# Patient Record
Sex: MALE | Race: WHITE | Employment: UNEMPLOYED | URBAN - METROPOLITAN AREA
[De-identification: names, ages, dates, MRNs, and addresses within clinical notes are randomized per-mention and may not be internally consistent; named-entity substitution may affect disease eponyms.]

---

## 2022-10-27 ENCOUNTER — HOSPITAL ENCOUNTER (OUTPATIENT)
Dept: MRI IMAGING | Age: 62
Discharge: HOME | End: 2022-10-29
Payer: OTHER GOVERNMENT

## 2022-10-27 DIAGNOSIS — R93.2 ABNORMAL FINDINGS ON DIAGNOSTIC IMAGING OF LIVER: ICD-10-CM

## 2022-10-27 PROCEDURE — 6360000004 HC RX CONTRAST MEDICATION: Performed by: RADIOLOGY

## 2022-10-27 PROCEDURE — 74183 MRI ABD W/O CNTR FLWD CNTR: CPT

## 2022-10-27 PROCEDURE — A9581 GADOXETATE DISODIUM INJ: HCPCS | Performed by: RADIOLOGY

## 2022-10-27 RX ADMIN — GADOXETATE DISODIUM 10 ML: 181.43 INJECTION, SOLUTION INTRAVENOUS at 10:50

## 2022-11-22 ENCOUNTER — OFFICE VISIT (OUTPATIENT)
Dept: ENDOCRINOLOGY | Age: 62
End: 2022-11-22
Payer: OTHER GOVERNMENT

## 2022-11-22 VITALS
HEIGHT: 75 IN | RESPIRATION RATE: 18 BRPM | OXYGEN SATURATION: 99 % | HEART RATE: 68 BPM | BODY MASS INDEX: 31.08 KG/M2 | WEIGHT: 250 LBS | DIASTOLIC BLOOD PRESSURE: 71 MMHG | SYSTOLIC BLOOD PRESSURE: 122 MMHG

## 2022-11-22 DIAGNOSIS — N52.9 ERECTILE DYSFUNCTION, UNSPECIFIED ERECTILE DYSFUNCTION TYPE: ICD-10-CM

## 2022-11-22 DIAGNOSIS — E29.1 HYPOGONADISM IN MALE: Primary | ICD-10-CM

## 2022-11-22 DIAGNOSIS — E29.1 HYPOGONADISM IN MALE: ICD-10-CM

## 2022-11-22 DIAGNOSIS — E55.9 VITAMIN D DEFICIENCY: ICD-10-CM

## 2022-11-22 LAB
ANION GAP SERPL CALCULATED.3IONS-SCNC: 7 MMOL/L (ref 7–16)
BUN BLDV-MCNC: 11 MG/DL (ref 6–23)
CALCIUM SERPL-MCNC: 9.3 MG/DL (ref 8.6–10.2)
CHLORIDE BLD-SCNC: 101 MMOL/L (ref 98–107)
CO2: 31 MMOL/L (ref 22–29)
CREAT SERPL-MCNC: 1 MG/DL (ref 0.7–1.2)
GFR SERPL CREATININE-BSD FRML MDRD: >60 ML/MIN/1.73
GLUCOSE BLD-MCNC: 84 MG/DL (ref 74–99)
HCT VFR BLD CALC: 48.3 % (ref 37–54)
POTASSIUM SERPL-SCNC: 4.5 MMOL/L (ref 3.5–5)
PROSTATE SPECIFIC ANTIGEN: 5.96 NG/ML (ref 0–4)
SODIUM BLD-SCNC: 139 MMOL/L (ref 132–146)
VITAMIN D 25-HYDROXY: 68 NG/ML (ref 30–100)

## 2022-11-22 PROCEDURE — 99204 OFFICE O/P NEW MOD 45 MIN: CPT | Performed by: INTERNAL MEDICINE

## 2022-11-22 RX ORDER — SILDENAFIL 100 MG/1
TABLET, FILM COATED ORAL
COMMUNITY
Start: 2022-06-10

## 2022-11-22 RX ORDER — ASCORBIC ACID 500 MG
TABLET ORAL
COMMUNITY
Start: 2021-01-27

## 2022-11-22 RX ORDER — TESTOSTERONE 16.2 MG/G
GEL TRANSDERMAL
Qty: 75 G | Refills: 3
Start: 2022-11-22 | End: 2023-04-17

## 2022-11-22 RX ORDER — POTASSIUM CHLORIDE 750 MG/1
TABLET, FILM COATED, EXTENDED RELEASE ORAL
COMMUNITY
Start: 2021-11-17

## 2022-11-22 RX ORDER — FUROSEMIDE 40 MG/1
TABLET ORAL
COMMUNITY
Start: 2022-08-09

## 2022-11-22 RX ORDER — METOPROLOL TARTRATE 100 MG/1
TABLET ORAL
COMMUNITY
Start: 2022-10-15

## 2022-11-22 RX ORDER — LANOLIN ALCOHOL/MO/W.PET/CERES
CREAM (GRAM) TOPICAL
COMMUNITY
Start: 2021-01-27

## 2022-11-22 RX ORDER — TESTOSTERONE 16.2 MG/G
GEL TRANSDERMAL
COMMUNITY
Start: 2022-09-26 | End: 2022-11-22 | Stop reason: SDUPTHER

## 2022-11-22 RX ORDER — GABAPENTIN 300 MG/1
CAPSULE ORAL
COMMUNITY
Start: 2022-10-03

## 2022-11-22 RX ORDER — ALLOPURINOL 100 MG/1
100 TABLET ORAL DAILY
COMMUNITY
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
## 2022-11-24 LAB
SEX HORMONE BINDING GLOBULIN: 97 NMOL/L (ref 11–80)
TESTOSTERONE FREE-NONMALE: ABNORMAL PG/ML (ref 47–244)
TESTOSTERONE TOTAL: >1500 NG/DL (ref 220–1000)

## 2022-11-27 ENCOUNTER — TELEPHONE (OUTPATIENT)
Dept: ENDOCRINOLOGY | Age: 62
End: 2022-11-27

## 2022-11-27 DIAGNOSIS — E29.1 HYPOGONADISM IN MALE: Primary | ICD-10-CM

## 2022-11-27 PROBLEM — E55.9 VITAMIN D DEFICIENCY: Status: ACTIVE | Noted: 2022-11-27

## 2022-11-27 NOTE — TELEPHONE ENCOUNTER
Endocrine staff,   Please notify pt that I have reviewed your recent results. Your Testosterone level is extremely high and this can significantly increase your risk for blood clots.      Please stop T therapy completely and recheck level in 8 weeks (late January)

## 2023-09-15 ENCOUNTER — APPOINTMENT (OUTPATIENT)
Dept: GENERAL RADIOLOGY | Age: 63
DRG: 184 | End: 2023-09-15
Payer: OTHER GOVERNMENT

## 2023-09-15 ENCOUNTER — APPOINTMENT (OUTPATIENT)
Dept: CT IMAGING | Age: 63
DRG: 184 | End: 2023-09-15
Payer: OTHER GOVERNMENT

## 2023-09-15 ENCOUNTER — HOSPITAL ENCOUNTER (INPATIENT)
Age: 63
LOS: 6 days | Discharge: NURSING, REHAB OR LONG-TERM CARE | DRG: 184 | End: 2023-09-21
Attending: EMERGENCY MEDICINE | Admitting: SURGERY
Payer: OTHER GOVERNMENT

## 2023-09-15 DIAGNOSIS — I48.11 LONGSTANDING PERSISTENT ATRIAL FIBRILLATION (HCC): ICD-10-CM

## 2023-09-15 DIAGNOSIS — S32.009A CLOSED FRACTURE OF TRANSVERSE PROCESS OF LUMBAR VERTEBRA, INITIAL ENCOUNTER (HCC): ICD-10-CM
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
## 2023-09-15 DIAGNOSIS — S22.42XA CLOSED FRACTURE OF MULTIPLE RIBS OF LEFT SIDE, INITIAL ENCOUNTER: ICD-10-CM

## 2023-09-15 PROBLEM — T14.90XA TRAUMA: Status: ACTIVE | Noted: 2023-09-15

## 2023-09-15 PROBLEM — R59.0 RETROPERITONEAL LYMPHADENOPATHY: Status: ACTIVE | Noted: 2023-09-15

## 2023-09-15 LAB
ALBUMIN SERPL-MCNC: 2.7 G/DL (ref 3.5–5.2)
ALP SERPL-CCNC: 169 U/L (ref 40–129)
ALT SERPL-CCNC: 23 U/L (ref 0–40)
ANION GAP SERPL CALCULATED.3IONS-SCNC: 11 MMOL/L (ref 7–16)
AST SERPL-CCNC: 52 U/L (ref 0–39)
BASOPHILS # BLD: 0 K/UL (ref 0–0.2)
BASOPHILS NFR BLD: 0 % (ref 0–2)
BILIRUB DIRECT SERPL-MCNC: 1.7 MG/DL (ref 0–0.3)
BILIRUB SERPL-MCNC: 2.9 MG/DL (ref 0–1.2)
BNP SERPL-MCNC: 896 PG/ML (ref 0–125)
BUN SERPL-MCNC: 7 MG/DL (ref 6–23)
CALCIUM SERPL-MCNC: 8.6 MG/DL (ref 8.6–10.2)
CHLORIDE SERPL-SCNC: 105 MMOL/L (ref 98–107)
CO2 SERPL-SCNC: 24 MMOL/L (ref 22–29)
CREAT SERPL-MCNC: 0.7 MG/DL (ref 0.7–1.2)
EOSINOPHIL # BLD: 0.09 K/UL (ref 0.05–0.5)
EOSINOPHILS RELATIVE PERCENT: 1 % (ref 0–6)
ERYTHROCYTE [DISTWIDTH] IN BLOOD BY AUTOMATED COUNT: 13.8 % (ref 11.5–15)
GFR SERPL CREATININE-BSD FRML MDRD: >60 ML/MIN/1.73M2
GLUCOSE SERPL-MCNC: 98 MG/DL (ref 74–99)
HCT VFR BLD AUTO: 42 % (ref 37–54)
HGB BLD-MCNC: 14.4 G/DL (ref 12.5–16.5)
LYMPHOCYTES NFR BLD: 0.51 K/UL (ref 1.5–4)
LYMPHOCYTES RELATIVE PERCENT: 5 % (ref 20–42)
MCH RBC QN AUTO: 36.6 PG (ref 26–35)
MCHC RBC AUTO-ENTMCNC: 34.3 G/DL (ref 32–34.5)
MCV RBC AUTO: 106.9 FL (ref 80–99.9)
METAMYELOCYTES ABSOLUTE COUNT: 0.09 K/UL (ref 0–0.12)
METAMYELOCYTES: 1 % (ref 0–1)
MONOCYTES NFR BLD: 0.94 K/UL (ref 0.1–0.95)
MONOCYTES NFR BLD: 10 % (ref 2–12)
NEUTROPHILS NFR BLD: 84 % (ref 43–80)
NEUTS SEG NFR BLD: 8.18 K/UL (ref 1.8–7.3)
PLATELET # BLD AUTO: 111 K/UL (ref 130–450)
PMV BLD AUTO: 10.4 FL (ref 7–12)
POTASSIUM SERPL-SCNC: 4 MMOL/L (ref 3.5–5)
PROT SERPL-MCNC: 6.8 G/DL (ref 6.4–8.3)
RBC # BLD AUTO: 3.93 M/UL (ref 3.8–5.8)
RBC # BLD: ABNORMAL 10*6/UL
SODIUM SERPL-SCNC: 140 MMOL/L (ref 132–146)
TROPONIN I SERPL HS-MCNC: 28 NG/L (ref 0–11)
TROPONIN I SERPL HS-MCNC: 30 NG/L (ref 0–11)
WBC OTHER # BLD: 9.8 K/UL (ref 4.5–11.5)

## 2023-09-15 PROCEDURE — 72131 CT LUMBAR SPINE W/O DYE: CPT

## 2023-09-15 PROCEDURE — 72125 CT NECK SPINE W/O DYE: CPT

## 2023-09-15 PROCEDURE — 71045 X-RAY EXAM CHEST 1 VIEW: CPT

## 2023-09-15 PROCEDURE — 85025 COMPLETE CBC W/AUTO DIFF WBC: CPT

## 2023-09-15 PROCEDURE — 6360000002 HC RX W HCPCS

## 2023-09-15 PROCEDURE — 99285 EMERGENCY DEPT VISIT HI MDM: CPT

## 2023-09-15 PROCEDURE — 96374 THER/PROPH/DIAG INJ IV PUSH: CPT

## 2023-09-15 PROCEDURE — 6360000004 HC RX CONTRAST MEDICATION: Performed by: RADIOLOGY

## 2023-09-15 PROCEDURE — 84484 ASSAY OF TROPONIN QUANT: CPT

## 2023-09-15 PROCEDURE — 6370000000 HC RX 637 (ALT 250 FOR IP)

## 2023-09-15 PROCEDURE — 74177 CT ABD & PELVIS W/CONTRAST: CPT

## 2023-09-15 PROCEDURE — 70450 CT HEAD/BRAIN W/O DYE: CPT

## 2023-09-15 PROCEDURE — 72170 X-RAY EXAM OF PELVIS: CPT

## 2023-09-15 PROCEDURE — 82248 BILIRUBIN DIRECT: CPT

## 2023-09-15 PROCEDURE — 2580000003 HC RX 258: Performed by: RADIOLOGY

## 2023-09-15 PROCEDURE — 80053 COMPREHEN METABOLIC PANEL: CPT

## 2023-09-15 PROCEDURE — 71275 CT ANGIOGRAPHY CHEST: CPT

## 2023-09-15 PROCEDURE — 93005 ELECTROCARDIOGRAM TRACING: CPT

## 2023-09-15 PROCEDURE — 99223 1ST HOSP IP/OBS HIGH 75: CPT | Performed by: SURGERY

## 2023-09-15 PROCEDURE — 2060000000 HC ICU INTERMEDIATE R&B

## 2023-09-15 PROCEDURE — 83880 ASSAY OF NATRIURETIC PEPTIDE: CPT

## 2023-09-15 PROCEDURE — 2580000003 HC RX 258

## 2023-09-15 PROCEDURE — 6360000002 HC RX W HCPCS: Performed by: EMERGENCY MEDICINE

## 2023-09-15 PROCEDURE — 96376 TX/PRO/DX INJ SAME DRUG ADON: CPT

## 2023-09-15 RX ORDER — FOLIC ACID 1 MG/1
1 TABLET ORAL DAILY
Status: DISCONTINUED | OUTPATIENT
Start: 2023-09-15 | End: 2023-09-16

## 2023-09-15 RX ORDER — ONDANSETRON 2 MG/ML
4 INJECTION INTRAMUSCULAR; INTRAVENOUS EVERY 6 HOURS PRN
Status: DISCONTINUED | OUTPATIENT
Start: 2023-09-15 | End: 2023-09-21 | Stop reason: HOSPADM

## 2023-09-15 RX ORDER — SODIUM CHLORIDE 0.9 % (FLUSH) 0.9 %
10 SYRINGE (ML) INJECTION PRN
Status: DISCONTINUED | OUTPATIENT
Start: 2023-09-15 | End: 2023-09-21 | Stop reason: HOSPADM

## 2023-09-15 RX ORDER — ONDANSETRON 4 MG/1
4 TABLET, ORALLY DISINTEGRATING ORAL EVERY 8 HOURS PRN
Status: DISCONTINUED | OUTPATIENT
Start: 2023-09-15 | End: 2023-09-21 | Stop reason: HOSPADM

## 2023-09-15 RX ORDER — MULTIVITAMIN WITH IRON
1 TABLET ORAL DAILY
Status: DISCONTINUED | OUTPATIENT
Start: 2023-09-15 | End: 2023-09-21 | Stop reason: HOSPADM

## 2023-09-15 RX ORDER — SENNA AND DOCUSATE SODIUM 50; 8.6 MG/1; MG/1
1 TABLET, FILM COATED ORAL 2 TIMES DAILY
Status: DISCONTINUED | OUTPATIENT
Start: 2023-09-16 | End: 2023-09-21 | Stop reason: HOSPADM

## 2023-09-15 RX ORDER — SODIUM CHLORIDE 9 MG/ML
INJECTION, SOLUTION INTRAVENOUS PRN
Status: DISCONTINUED | OUTPATIENT
Start: 2023-09-15 | End: 2023-09-21 | Stop reason: HOSPADM

## 2023-09-15 RX ORDER — METOPROLOL TARTRATE 50 MG/1
50 TABLET, FILM COATED ORAL 2 TIMES DAILY
Status: DISCONTINUED | OUTPATIENT
Start: 2023-09-15 | End: 2023-09-18

## 2023-09-15 RX ORDER — FENTANYL CITRATE 50 UG/ML
50 INJECTION, SOLUTION INTRAMUSCULAR; INTRAVENOUS ONCE
Status: COMPLETED | OUTPATIENT
Start: 2023-09-15 | End: 2023-09-15

## 2023-09-15 RX ORDER — SODIUM CHLORIDE 0.9 % (FLUSH) 0.9 %
10 SYRINGE (ML) INJECTION EVERY 12 HOURS SCHEDULED
Status: DISCONTINUED | OUTPATIENT
Start: 2023-09-15 | End: 2023-09-21 | Stop reason: HOSPADM

## 2023-09-15 RX ORDER — OXYCODONE HYDROCHLORIDE 10 MG/1
10 TABLET ORAL EVERY 4 HOURS PRN
Status: DISCONTINUED | OUTPATIENT
Start: 2023-09-15 | End: 2023-09-21 | Stop reason: HOSPADM

## 2023-09-15 RX ORDER — ACETAMINOPHEN 500 MG
1000 TABLET ORAL EVERY 8 HOURS SCHEDULED
Status: DISCONTINUED | OUTPATIENT
Start: 2023-09-15 | End: 2023-09-21 | Stop reason: HOSPADM

## 2023-09-15 RX ORDER — SODIUM CHLORIDE 0.9 % (FLUSH) 0.9 %
10 SYRINGE (ML) INJECTION
Status: COMPLETED | OUTPATIENT
Start: 2023-09-15 | End: 2023-09-15

## 2023-09-15 RX ORDER — PHENOBARBITAL 32.4 MG/1
64.8 TABLET ORAL EVERY 6 HOURS
Status: DISCONTINUED | OUTPATIENT
Start: 2023-09-15 | End: 2023-09-17

## 2023-09-15 RX ORDER — POLYETHYLENE GLYCOL 3350 17 G/17G
17 POWDER, FOR SOLUTION ORAL DAILY
Status: DISCONTINUED | OUTPATIENT
Start: 2023-09-16 | End: 2023-09-21 | Stop reason: HOSPADM

## 2023-09-15 RX ORDER — LANOLIN ALCOHOL/MO/W.PET/CERES
100 CREAM (GRAM) TOPICAL DAILY
Status: DISCONTINUED | OUTPATIENT
Start: 2023-09-15 | End: 2023-09-21 | Stop reason: HOSPADM

## 2023-09-15 RX ORDER — OXYCODONE HYDROCHLORIDE 5 MG/1
5 TABLET ORAL EVERY 4 HOURS PRN
Status: DISCONTINUED | OUTPATIENT
Start: 2023-09-15 | End: 2023-09-21 | Stop reason: HOSPADM

## 2023-09-15 RX ORDER — METHOCARBAMOL 500 MG/1
1000 TABLET, FILM COATED ORAL 4 TIMES DAILY
Status: DISCONTINUED | OUTPATIENT
Start: 2023-09-15 | End: 2023-09-16

## 2023-09-15 RX ADMIN — METHOCARBAMOL 1000 MG: 500 TABLET ORAL at 21:15

## 2023-09-15 RX ADMIN — FENTANYL CITRATE 50 MCG: 50 INJECTION INTRAMUSCULAR; INTRAVENOUS at 09:25

## 2023-09-15 RX ADMIN — IOPAMIDOL 75 ML: 755 INJECTION, SOLUTION INTRAVENOUS at 10:43

## 2023-09-15 RX ADMIN — SODIUM CHLORIDE, PRESERVATIVE FREE 10 ML: 5 INJECTION INTRAVENOUS at 21:16

## 2023-09-15 RX ADMIN — Medication 10 ML: at 10:46

## 2023-09-15 RX ADMIN — METOPROLOL TARTRATE 50 MG: 50 TABLET ORAL at 13:42

## 2023-09-15 RX ADMIN — OXYCODONE HYDROCHLORIDE 10 MG: 10 TABLET ORAL at 21:14

## 2023-09-15 RX ADMIN — METOPROLOL TARTRATE 50 MG: 50 TABLET ORAL at 21:15

## 2023-09-15 RX ADMIN — PHENOBARBITAL 64.8 MG: 32.4 TABLET ORAL at 21:15

## 2023-09-15 RX ADMIN — FENTANYL CITRATE 50 MCG: 50 INJECTION INTRAMUSCULAR; INTRAVENOUS at 13:36

## 2023-09-15 ASSESSMENT — PAIN SCALES - GENERAL
PAINLEVEL_OUTOF10: 10
PAINLEVEL_OUTOF10: 10

## 2023-09-15 ASSESSMENT — LIFESTYLE VARIABLES
HOW MANY STANDARD DRINKS CONTAINING ALCOHOL DO YOU HAVE ON A TYPICAL DAY: 3 OR 4
HOW OFTEN DO YOU HAVE A DRINK CONTAINING ALCOHOL: 4 OR MORE TIMES A WEEK

## 2023-09-15 ASSESSMENT — PAIN DESCRIPTION - DESCRIPTORS
DESCRIPTORS: ACHING;SHARP;SHOOTING
DESCRIPTORS: ACHING;CRAMPING;DISCOMFORT

## 2023-09-15 ASSESSMENT — PAIN DESCRIPTION - LOCATION
LOCATION: BACK;HIP
LOCATION: RIB CAGE

## 2023-09-15 ASSESSMENT — PAIN DESCRIPTION - ORIENTATION: ORIENTATION: LEFT

## 2023-09-15 NOTE — ED PROVIDER NOTES
Ref Range    Troponin, High Sensitivity 28 (H) 0 - 11 ng/L   Bilirubin, Direct   Result Value Ref Range    Bilirubin, Direct 1.7 (H) 0.0 - 0.3 mg/dL   EKG 12 Lead   Result Value Ref Range    Ventricular Rate 123 BPM    Atrial Rate 141 BPM    QRS Duration 80 ms    Q-T Interval 324 ms    QTc Calculation (Bazett) 463 ms    R Axis 56 degrees    T Axis 84 degrees       RADIOLOGY:  Imaging Interpreted by Radiologist, initial wet read of imaging interpreted by myself  XR PELVIS (1-2 VIEWS)   Final Result   Unremarkable pelvis. Right SONA. CT ABDOMEN PELVIS W IV CONTRAST Additional Contrast? None   Final Result   No evidence of pulmonary embolism. There is nondisplaced fracture seen of   the left posterolateral 9th and 10th ribs with associated pleural thickening   and atelectatic changes at the lung bases bilaterally. No pulmonary   contusion. No acute traumatic intra-abdominal or pelvic abnormality identified with   varicosities identified in the right upper quadrant. There are multiple   enlarged lymph nodes seen throughout the retroperitoneum as well as the   mesentery and poor periportal region. Clinical correlation is needed. No   acute bony abnormality seen within the abdomen or pelvis with healing rib   fracture seen of the left transverse processes of L2 and L3. CTA PULMONARY W CONTRAST   Final Result   No evidence of pulmonary embolism. There is nondisplaced fracture seen of   the left posterolateral 9th and 10th ribs with associated pleural thickening   and atelectatic changes at the lung bases bilaterally. No pulmonary   contusion. No acute traumatic intra-abdominal or pelvic abnormality identified with   varicosities identified in the right upper quadrant. There are multiple   enlarged lymph nodes seen throughout the retroperitoneum as well as the   mesentery and poor periportal region. Clinical correlation is needed.   No   acute bony abnormality seen within the abdomen or

## 2023-09-15 NOTE — DISCHARGE INSTRUCTIONS
TRAUMA SERVICES DISCHARGE INSTRUCTIONS    Call [phone], option 2, for any questions/concerns and for follow-up appointment in 1 week(s) after discharge from [facility]. Please follow the instructions checked below:    During the course of your workup, we identified an incidental finding of:  retroperitoneal and periportal lymphadenopathy  Please follow-up with your primary care provider. ACTIVITY INSTRUCTIONS  Increase activity as tolerated  No heavy lifting or strenuous activity  Take your incentive spirometer home and use 4-6 times/day   [x]  No driving until cleared by trauma    WOUND/DRESSING INSTRUCTIONS:  You may shower. No sitting in bath tub, hot tub or swimming until cleared by physician. Ice to areas of pain for first 24 hours. Heat to areas of pain after that. Wash areas of lacerations/abrasions with soap & water. Rinse well. Pat dry with clean towel. Apply thin layer of Bacitracin, Neosporin, or triple antibiotic cream to affected area 2-3 times per day. Keep wounds clean and dry. MEDICATION INSTRUCTIONS  Take medication as prescribed. When taking pain medications, you may experience dizziness or drowsiness. Do not drink alcohol or drive when taking these medications. You may experience constipation while taking pain medication. You may take over the counter stool softeners such as docusate (Colace), sennosides S (Senokot-S), or Miralax.   []  You may take Ibuprofen (over the counter) as directed for mild pain. --You may take up to 800mg every 8 hours for pain, please take with food or milk. [x]  You may take acetaminophen (Tylenol) products. Do NOT take more than 4000mg of Tylenol in 24h. []  Do not take any other acetaminophen (Tylenol) products if you are taking Percocet or Norco, as these contain Tylenol. --Do NOT take more than 4000mg of Tylenol in 24h. OPIOID MEDICATION INSTRUCTIONS  Read the medication guide that is included with your prescription.   Take your

## 2023-09-16 LAB
ALBUMIN SERPL-MCNC: 2.3 G/DL (ref 3.5–5.2)
ALP SERPL-CCNC: 131 U/L (ref 40–129)
ALT SERPL-CCNC: 21 U/L (ref 0–40)
ANION GAP SERPL CALCULATED.3IONS-SCNC: 5 MMOL/L (ref 7–16)
AST SERPL-CCNC: 51 U/L (ref 0–39)
BASOPHILS # BLD: 0.07 K/UL (ref 0–0.2)
BASOPHILS NFR BLD: 1 % (ref 0–2)
BILIRUB DIRECT SERPL-MCNC: 1.2 MG/DL (ref 0–0.3)
BILIRUB SERPL-MCNC: 3.7 MG/DL (ref 0–1.2)
BUN SERPL-MCNC: 10 MG/DL (ref 6–23)
CALCIUM SERPL-MCNC: 7.9 MG/DL (ref 8.6–10.2)
CHLORIDE SERPL-SCNC: 102 MMOL/L (ref 98–107)
CO2 SERPL-SCNC: 25 MMOL/L (ref 22–29)
CREAT SERPL-MCNC: 0.6 MG/DL (ref 0.7–1.2)
EKG ATRIAL RATE: 141 BPM
EKG Q-T INTERVAL: 324 MS
EKG QRS DURATION: 80 MS
EKG QTC CALCULATION (BAZETT): 463 MS
EKG R AXIS: 56 DEGREES
EKG T AXIS: 84 DEGREES
EKG VENTRICULAR RATE: 123 BPM
EOSINOPHIL # BLD: 0.06 K/UL (ref 0.05–0.5)
EOSINOPHILS RELATIVE PERCENT: 1 % (ref 0–6)
ERYTHROCYTE [DISTWIDTH] IN BLOOD BY AUTOMATED COUNT: 13.5 % (ref 11.5–15)
GFR SERPL CREATININE-BSD FRML MDRD: >60 ML/MIN/1.73M2
GLUCOSE SERPL-MCNC: 95 MG/DL (ref 74–99)
HCT VFR BLD AUTO: 39.2 % (ref 37–54)
HGB BLD-MCNC: 13.8 G/DL (ref 12.5–16.5)
IMM GRANULOCYTES # BLD AUTO: 0.08 K/UL (ref 0–0.58)
IMM GRANULOCYTES NFR BLD: 1 % (ref 0–5)
LYMPHOCYTES NFR BLD: 1.27 K/UL (ref 1.5–4)
LYMPHOCYTES RELATIVE PERCENT: 18 % (ref 20–42)
MCH RBC QN AUTO: 37 PG (ref 26–35)
MCHC RBC AUTO-ENTMCNC: 35.2 G/DL (ref 32–34.5)
MCV RBC AUTO: 105.1 FL (ref 80–99.9)
MONOCYTES NFR BLD: 1.11 K/UL (ref 0.1–0.95)
MONOCYTES NFR BLD: 16 % (ref 2–12)
NEUTROPHILS NFR BLD: 63 % (ref 43–80)
NEUTS SEG NFR BLD: 4.46 K/UL (ref 1.8–7.3)
PLATELET, FLUORESCENCE: 108 K/UL (ref 130–450)
PMV BLD AUTO: 10.8 FL (ref 7–12)
POTASSIUM SERPL-SCNC: 3.8 MMOL/L (ref 3.5–5)
PROT SERPL-MCNC: 6 G/DL (ref 6.4–8.3)
RBC # BLD AUTO: 3.73 M/UL (ref 3.8–5.8)
SODIUM SERPL-SCNC: 132 MMOL/L (ref 132–146)
WBC OTHER # BLD: 7.1 K/UL (ref 4.5–11.5)

## 2023-09-16 PROCEDURE — 97530 THERAPEUTIC ACTIVITIES: CPT

## 2023-09-16 PROCEDURE — 2060000000 HC ICU INTERMEDIATE R&B

## 2023-09-16 PROCEDURE — 97165 OT EVAL LOW COMPLEX 30 MIN: CPT

## 2023-09-16 PROCEDURE — 2580000003 HC RX 258

## 2023-09-16 PROCEDURE — 6370000000 HC RX 637 (ALT 250 FOR IP)

## 2023-09-16 PROCEDURE — 85025 COMPLETE CBC W/AUTO DIFF WBC: CPT

## 2023-09-16 PROCEDURE — 97535 SELF CARE MNGMENT TRAINING: CPT

## 2023-09-16 PROCEDURE — 99232 SBSQ HOSP IP/OBS MODERATE 35: CPT | Performed by: SURGERY

## 2023-09-16 PROCEDURE — 36415 COLL VENOUS BLD VENIPUNCTURE: CPT

## 2023-09-16 PROCEDURE — 97161 PT EVAL LOW COMPLEX 20 MIN: CPT

## 2023-09-16 PROCEDURE — 82248 BILIRUBIN DIRECT: CPT

## 2023-09-16 PROCEDURE — 6360000002 HC RX W HCPCS

## 2023-09-16 PROCEDURE — 80053 COMPREHEN METABOLIC PANEL: CPT

## 2023-09-16 RX ORDER — LIDOCAINE 4 G/G
1 PATCH TOPICAL DAILY
Status: DISCONTINUED | OUTPATIENT
Start: 2023-09-17 | End: 2023-09-21 | Stop reason: HOSPADM

## 2023-09-16 RX ORDER — METHOCARBAMOL 750 MG/1
1500 TABLET, FILM COATED ORAL 4 TIMES DAILY
Status: DISCONTINUED | OUTPATIENT
Start: 2023-09-17 | End: 2023-09-21 | Stop reason: HOSPADM

## 2023-09-16 RX ORDER — FOLIC ACID 1 MG/1
500 TABLET ORAL DAILY
Status: DISCONTINUED | OUTPATIENT
Start: 2023-09-17 | End: 2023-09-17

## 2023-09-16 RX ORDER — HEPARIN SODIUM 10000 [USP'U]/ML
5000 INJECTION, SOLUTION INTRAVENOUS; SUBCUTANEOUS EVERY 8 HOURS SCHEDULED
Status: DISPENSED | OUTPATIENT
Start: 2023-09-16 | End: 2023-09-18

## 2023-09-16 RX ORDER — GABAPENTIN 300 MG/1
300 CAPSULE ORAL 2 TIMES DAILY
Status: DISCONTINUED | OUTPATIENT
Start: 2023-09-16 | End: 2023-09-21 | Stop reason: HOSPADM

## 2023-09-16 RX ADMIN — OXYCODONE HYDROCHLORIDE 10 MG: 10 TABLET ORAL at 19:02

## 2023-09-16 RX ADMIN — METOPROLOL TARTRATE 50 MG: 50 TABLET ORAL at 09:50

## 2023-09-16 RX ADMIN — SODIUM CHLORIDE, PRESERVATIVE FREE 10 ML: 5 INJECTION INTRAVENOUS at 20:40

## 2023-09-16 RX ADMIN — OXYCODONE HYDROCHLORIDE 10 MG: 10 TABLET ORAL at 09:48

## 2023-09-16 RX ADMIN — OXYCODONE HYDROCHLORIDE 10 MG: 10 TABLET ORAL at 15:00

## 2023-09-16 RX ADMIN — FOLIC ACID 1 MG: 1 TABLET ORAL at 09:49

## 2023-09-16 RX ADMIN — GABAPENTIN 300 MG: 300 CAPSULE ORAL at 20:39

## 2023-09-16 RX ADMIN — PHENOBARBITAL 64.8 MG: 32.4 TABLET ORAL at 15:12

## 2023-09-16 RX ADMIN — OXYCODONE HYDROCHLORIDE 10 MG: 10 TABLET ORAL at 01:04

## 2023-09-16 RX ADMIN — PHENOBARBITAL 64.8 MG: 32.4 TABLET ORAL at 09:48

## 2023-09-16 RX ADMIN — METHOCARBAMOL 1000 MG: 500 TABLET ORAL at 09:49

## 2023-09-16 RX ADMIN — PHENOBARBITAL 64.8 MG: 32.4 TABLET ORAL at 03:18

## 2023-09-16 RX ADMIN — METHOCARBAMOL 1000 MG: 500 TABLET ORAL at 19:02

## 2023-09-16 RX ADMIN — HEPARIN SODIUM 5000 UNITS: 10000 INJECTION INTRAVENOUS; SUBCUTANEOUS at 20:40

## 2023-09-16 RX ADMIN — METHOCARBAMOL 1000 MG: 500 TABLET ORAL at 23:23

## 2023-09-16 RX ADMIN — MULTIVITAMIN TABLET 1 TABLET: TABLET at 09:49

## 2023-09-16 RX ADMIN — PHENOBARBITAL 64.8 MG: 32.4 TABLET ORAL at 20:39

## 2023-09-16 RX ADMIN — GABAPENTIN 300 MG: 300 CAPSULE ORAL at 13:03

## 2023-09-16 RX ADMIN — Medication 100 MG: at 09:49

## 2023-09-16 RX ADMIN — METHOCARBAMOL 1000 MG: 500 TABLET ORAL at 13:03

## 2023-09-16 RX ADMIN — HEPARIN SODIUM 5000 UNITS: 10000 INJECTION INTRAVENOUS; SUBCUTANEOUS at 13:04

## 2023-09-16 RX ADMIN — METOPROLOL TARTRATE 50 MG: 50 TABLET ORAL at 20:39

## 2023-09-16 RX ADMIN — ACETAMINOPHEN 1000 MG: 500 TABLET ORAL at 13:03

## 2023-09-16 ASSESSMENT — ENCOUNTER SYMPTOMS
ALLERGIC/IMMUNOLOGIC NEGATIVE: 1
RESPIRATORY NEGATIVE: 1
ABDOMINAL DISTENTION: 0
NAUSEA: 0
VOMITING: 0
EYES NEGATIVE: 1
CONSTIPATION: 0
SHORTNESS OF BREATH: 0
COUGH: 0
ABDOMINAL PAIN: 0
BACK PAIN: 1
DIARRHEA: 0
GASTROINTESTINAL NEGATIVE: 1
ANAL BLEEDING: 0
BLOOD IN STOOL: 0

## 2023-09-16 ASSESSMENT — PAIN SCALES - GENERAL
PAINLEVEL_OUTOF10: 0
PAINLEVEL_OUTOF10: 10

## 2023-09-16 ASSESSMENT — PAIN DESCRIPTION - LOCATION
LOCATION: RIB CAGE
LOCATION: CHEST;BACK;HIP
LOCATION: BACK;CHEST;HIP
LOCATION: BACK;CHEST

## 2023-09-16 ASSESSMENT — PAIN DESCRIPTION - ORIENTATION: ORIENTATION: LEFT

## 2023-09-16 ASSESSMENT — PAIN DESCRIPTION - DESCRIPTORS
DESCRIPTORS: ACHING;CRAMPING;DISCOMFORT
DESCRIPTORS: ACHING;CRAMPING;CRUSHING;DISCOMFORT

## 2023-09-16 NOTE — DISCHARGE SUMMARY
Physician Discharge Summary     Patient ID:  Jory Castro  [id number]  63 y.o.  1960    Admit date: 9/15/2023    Discharge date and time: 09/21/2023 1230    Admitting Physician: Hector Dove MD     Admission Diagnoses: Trauma [place]  Fall due to stumbling, initial encounter [W01. 0XXA]    Discharge Diagnoses: Principal Problem:    Trauma  Active Problems:    Closed fracture of multiple ribs of left side    Retroperitoneal lymphadenopathy - INCIDENTAL FINDING    Fall due to stumbling    PAF (paroxysmal atrial fibrillation) (HCC)    Smoking    Elevated troponin    HARPAL (obstructive sleep apnea)  Resolved Problems:    * No resolved hospital problems. *      Admission Condition: stable    Discharged Condition: stable    Indication for Admission: [facility] Course/Procedures/Operation/treatments:   9/16: Mechanical fallreports he was helping his neighbor cut down branches last Friday (9/8) when he fell backwards from standing height onto his buttock and hit concrete. Transferred from [facility] for left-sided rib fractures and lumbar transverse process fractures. 9/17: Pain well controlled, to work with PT OT, tolerating regular diet, monitoring hyperbilirubinemia, continue phenobarbital in the setting of alcohol abuse  9/18: int non sustaining afib RVR. Home lasix resumed. 9/19: confusion and lethargy overnight, pnb decreased. Aox4 today w redirection. Feeling well. ECHO showing preserved EF 55%. Metoprolol was increased to 100 BID. Eliquis resumed   9/20: no further sustaining afib w RVR. New rash. Start Benadryl PRN. PNB stopped  9/21: worsening rash. Start solumedrol dose pack. Patient stable for DC. Return to ED is rash fails to improve or worsens.     Consults:   IP CONSULT TO TRAUMA SURGERY  IP CONSULT TO CASE MANAGEMENT  IP CONSULT TO SOCIAL WORK  IP CONSULT TO CARDIOLOGY    Significant Diagnostic Studies:   XR PELVIS (1-2 VIEWS)    Result Date: 9/15/2023  EXAMINATION: ONE XRAY VIEW OF THE

## 2023-09-17 ENCOUNTER — APPOINTMENT (OUTPATIENT)
Dept: GENERAL RADIOLOGY | Age: 63
DRG: 184 | End: 2023-09-17
Payer: OTHER GOVERNMENT

## 2023-09-17 PROBLEM — R79.89 ELEVATED TROPONIN: Status: ACTIVE | Noted: 2023-09-17

## 2023-09-17 PROBLEM — R77.8 ELEVATED TROPONIN: Status: ACTIVE | Noted: 2023-09-17

## 2023-09-17 PROBLEM — G47.33 OSA (OBSTRUCTIVE SLEEP APNEA): Status: ACTIVE | Noted: 2023-09-17

## 2023-09-17 PROBLEM — I48.0 PAF (PAROXYSMAL ATRIAL FIBRILLATION) (HCC): Status: ACTIVE | Noted: 2023-09-17

## 2023-09-17 PROBLEM — F17.200 SMOKING: Status: ACTIVE | Noted: 2023-09-17

## 2023-09-17 LAB
ALBUMIN SERPL-MCNC: 2.6 G/DL (ref 3.5–5.2)
ALP SERPL-CCNC: 154 U/L (ref 40–129)
ALT SERPL-CCNC: 26 U/L (ref 0–40)
ANION GAP SERPL CALCULATED.3IONS-SCNC: 16 MMOL/L (ref 7–16)
AST SERPL-CCNC: 62 U/L (ref 0–39)
BASOPHILS # BLD: 0.1 K/UL (ref 0–0.2)
BASOPHILS NFR BLD: 1 % (ref 0–2)
BILIRUB SERPL-MCNC: 3.9 MG/DL (ref 0–1.2)
BUN SERPL-MCNC: 11 MG/DL (ref 6–23)
CALCIUM SERPL-MCNC: 8.6 MG/DL (ref 8.6–10.2)
CHLORIDE SERPL-SCNC: 95 MMOL/L (ref 98–107)
CHOLEST SERPL-MCNC: 86 MG/DL
CK SERPL-CCNC: 189 U/L (ref 20–200)
CO2 SERPL-SCNC: 24 MMOL/L (ref 22–29)
CREAT SERPL-MCNC: 0.7 MG/DL (ref 0.7–1.2)
EKG ATRIAL RATE: 156 BPM
EKG ATRIAL RATE: 416 BPM
EKG Q-T INTERVAL: 290 MS
EKG Q-T INTERVAL: 318 MS
EKG QRS DURATION: 80 MS
EKG QRS DURATION: 82 MS
EKG QTC CALCULATION (BAZETT): 433 MS
EKG QTC CALCULATION (BAZETT): 502 MS
EKG R AXIS: 40 DEGREES
EKG R AXIS: 49 DEGREES
EKG T AXIS: -17 DEGREES
EKG T AXIS: 39 DEGREES
EKG VENTRICULAR RATE: 134 BPM
EKG VENTRICULAR RATE: 150 BPM
EOSINOPHIL # BLD: 0.05 K/UL (ref 0.05–0.5)
EOSINOPHILS RELATIVE PERCENT: 1 % (ref 0–6)
ERYTHROCYTE [DISTWIDTH] IN BLOOD BY AUTOMATED COUNT: 13.2 % (ref 11.5–15)
ETHANOLAMINE SERPL-MCNC: <10 MG/DL
GFR SERPL CREATININE-BSD FRML MDRD: >60 ML/MIN/1.73M2
GLUCOSE SERPL-MCNC: 98 MG/DL (ref 74–99)
HBA1C MFR BLD: 4.8 % (ref 4–5.6)
HCT VFR BLD AUTO: 45.5 % (ref 37–54)
HDLC SERPL-MCNC: 27 MG/DL
HGB BLD-MCNC: 15.6 G/DL (ref 12.5–16.5)
IMM GRANULOCYTES # BLD AUTO: 0.07 K/UL (ref 0–0.58)
IMM GRANULOCYTES NFR BLD: 1 % (ref 0–5)
LDLC SERPL CALC-MCNC: 46 MG/DL
LYMPHOCYTES NFR BLD: 1.58 K/UL (ref 1.5–4)
LYMPHOCYTES RELATIVE PERCENT: 15 % (ref 20–42)
MCH RBC QN AUTO: 36.4 PG (ref 26–35)
MCHC RBC AUTO-ENTMCNC: 34.3 G/DL (ref 32–34.5)
MCV RBC AUTO: 106.3 FL (ref 80–99.9)
MONOCYTES NFR BLD: 1.34 K/UL (ref 0.1–0.95)
MONOCYTES NFR BLD: 12 % (ref 2–12)
NEUTROPHILS NFR BLD: 71 % (ref 43–80)
NEUTS SEG NFR BLD: 7.72 K/UL (ref 1.8–7.3)
PLATELET, FLUORESCENCE: 163 K/UL (ref 130–450)
PMV BLD AUTO: 10.8 FL (ref 7–12)
POTASSIUM SERPL-SCNC: 3.8 MMOL/L (ref 3.5–5)
PROT SERPL-MCNC: 7.2 G/DL (ref 6.4–8.3)
RBC # BLD AUTO: 4.28 M/UL (ref 3.8–5.8)
SODIUM SERPL-SCNC: 135 MMOL/L (ref 132–146)
T4 FREE SERPL-MCNC: 1.5 NG/DL (ref 0.9–1.7)
TRIGL SERPL-MCNC: 64 MG/DL
TROPONIN I SERPL HS-MCNC: 33 NG/L (ref 0–11)
TSH SERPL DL<=0.05 MIU/L-ACNC: 11.55 UIU/ML (ref 0.27–4.2)
VLDLC SERPL CALC-MCNC: 13 MG/DL
WBC OTHER # BLD: 10.9 K/UL (ref 4.5–11.5)

## 2023-09-17 PROCEDURE — 93005 ELECTROCARDIOGRAM TRACING: CPT

## 2023-09-17 PROCEDURE — 84443 ASSAY THYROID STIM HORMONE: CPT

## 2023-09-17 PROCEDURE — 81001 URINALYSIS AUTO W/SCOPE: CPT

## 2023-09-17 PROCEDURE — 2060000000 HC ICU INTERMEDIATE R&B

## 2023-09-17 PROCEDURE — 6370000000 HC RX 637 (ALT 250 FOR IP)

## 2023-09-17 PROCEDURE — 84484 ASSAY OF TROPONIN QUANT: CPT

## 2023-09-17 PROCEDURE — 6360000002 HC RX W HCPCS

## 2023-09-17 PROCEDURE — 82550 ASSAY OF CK (CPK): CPT

## 2023-09-17 PROCEDURE — 84439 ASSAY OF FREE THYROXINE: CPT

## 2023-09-17 PROCEDURE — 71045 X-RAY EXAM CHEST 1 VIEW: CPT

## 2023-09-17 PROCEDURE — 2580000003 HC RX 258

## 2023-09-17 PROCEDURE — 6360000002 HC RX W HCPCS: Performed by: SURGERY

## 2023-09-17 PROCEDURE — 36415 COLL VENOUS BLD VENIPUNCTURE: CPT

## 2023-09-17 PROCEDURE — 85025 COMPLETE CBC W/AUTO DIFF WBC: CPT

## 2023-09-17 PROCEDURE — 80053 COMPREHEN METABOLIC PANEL: CPT

## 2023-09-17 PROCEDURE — 93005 ELECTROCARDIOGRAM TRACING: CPT | Performed by: SURGERY

## 2023-09-17 PROCEDURE — 99233 SBSQ HOSP IP/OBS HIGH 50: CPT | Performed by: SURGERY

## 2023-09-17 PROCEDURE — G0480 DRUG TEST DEF 1-7 CLASSES: HCPCS

## 2023-09-17 PROCEDURE — 2500000003 HC RX 250 WO HCPCS

## 2023-09-17 PROCEDURE — 2500000003 HC RX 250 WO HCPCS: Performed by: SURGERY

## 2023-09-17 PROCEDURE — 80061 LIPID PANEL: CPT

## 2023-09-17 PROCEDURE — 2500000003 HC RX 250 WO HCPCS: Performed by: STUDENT IN AN ORGANIZED HEALTH CARE EDUCATION/TRAINING PROGRAM

## 2023-09-17 PROCEDURE — 83036 HEMOGLOBIN GLYCOSYLATED A1C: CPT

## 2023-09-17 RX ORDER — METOPROLOL TARTRATE 5 MG/5ML
INJECTION INTRAVENOUS
Status: COMPLETED
Start: 2023-09-17 | End: 2023-09-17

## 2023-09-17 RX ORDER — FOLIC ACID 5 MG/ML
1 INJECTION, SOLUTION INTRAMUSCULAR; INTRAVENOUS; SUBCUTANEOUS DAILY
Status: DISCONTINUED | OUTPATIENT
Start: 2023-09-17 | End: 2023-09-21 | Stop reason: HOSPADM

## 2023-09-17 RX ORDER — METOPROLOL TARTRATE 5 MG/5ML
5 INJECTION INTRAVENOUS ONCE
Status: DISCONTINUED | OUTPATIENT
Start: 2023-09-17 | End: 2023-09-21 | Stop reason: HOSPADM

## 2023-09-17 RX ORDER — DILTIAZEM HYDROCHLORIDE 5 MG/ML
10 INJECTION INTRAVENOUS ONCE
Status: COMPLETED | OUTPATIENT
Start: 2023-09-17 | End: 2023-09-17

## 2023-09-17 RX ORDER — METOPROLOL TARTRATE 5 MG/5ML
5 INJECTION INTRAVENOUS ONCE
Status: COMPLETED | OUTPATIENT
Start: 2023-09-17 | End: 2023-09-17

## 2023-09-17 RX ORDER — PHENOBARBITAL SODIUM 65 MG/ML
130 INJECTION INTRAMUSCULAR EVERY 6 HOURS
Status: DISCONTINUED | OUTPATIENT
Start: 2023-09-17 | End: 2023-09-19

## 2023-09-17 RX ADMIN — GABAPENTIN 300 MG: 300 CAPSULE ORAL at 09:50

## 2023-09-17 RX ADMIN — FOLIC ACID 1 MG: 5 INJECTION, SOLUTION INTRAMUSCULAR; INTRAVENOUS; SUBCUTANEOUS at 15:12

## 2023-09-17 RX ADMIN — SODIUM CHLORIDE, PRESERVATIVE FREE 10 ML: 5 INJECTION INTRAVENOUS at 20:47

## 2023-09-17 RX ADMIN — METOPROLOL TARTRATE 5 MG: 5 INJECTION INTRAVENOUS at 04:11

## 2023-09-17 RX ADMIN — METHOCARBAMOL 1500 MG: 750 TABLET ORAL at 15:13

## 2023-09-17 RX ADMIN — HEPARIN SODIUM 5000 UNITS: 10000 INJECTION INTRAVENOUS; SUBCUTANEOUS at 09:50

## 2023-09-17 RX ADMIN — METHOCARBAMOL 1500 MG: 750 TABLET ORAL at 09:50

## 2023-09-17 RX ADMIN — HEPARIN SODIUM 5000 UNITS: 10000 INJECTION INTRAVENOUS; SUBCUTANEOUS at 20:48

## 2023-09-17 RX ADMIN — SODIUM CHLORIDE 2.5 MG/HR: 900 INJECTION, SOLUTION INTRAVENOUS at 06:47

## 2023-09-17 RX ADMIN — OXYCODONE HYDROCHLORIDE 10 MG: 10 TABLET ORAL at 15:13

## 2023-09-17 RX ADMIN — OXYCODONE HYDROCHLORIDE 10 MG: 10 TABLET ORAL at 19:33

## 2023-09-17 RX ADMIN — GABAPENTIN 300 MG: 300 CAPSULE ORAL at 21:00

## 2023-09-17 RX ADMIN — PHENOBARBITAL SODIUM 130 MG: 65 INJECTION INTRAMUSCULAR at 09:50

## 2023-09-17 RX ADMIN — METOPROLOL TARTRATE 5 MG: 5 INJECTION INTRAVENOUS at 04:35

## 2023-09-17 RX ADMIN — PHENOBARBITAL SODIUM 130 MG: 65 INJECTION INTRAMUSCULAR at 15:13

## 2023-09-17 RX ADMIN — METOPROLOL TARTRATE 50 MG: 50 TABLET ORAL at 20:46

## 2023-09-17 RX ADMIN — SENNOSIDES AND DOCUSATE SODIUM 1 TABLET: 50; 8.6 TABLET ORAL at 20:47

## 2023-09-17 RX ADMIN — METOPROLOL TARTRATE 5 MG: 5 INJECTION, SOLUTION INTRAVENOUS at 04:35

## 2023-09-17 RX ADMIN — OXYCODONE HYDROCHLORIDE 10 MG: 10 TABLET ORAL at 04:13

## 2023-09-17 RX ADMIN — Medication 100 MG: at 09:50

## 2023-09-17 RX ADMIN — OXYCODONE HYDROCHLORIDE 10 MG: 10 TABLET ORAL at 08:41

## 2023-09-17 RX ADMIN — MULTIVITAMIN TABLET 1 TABLET: TABLET at 09:50

## 2023-09-17 RX ADMIN — METHOCARBAMOL 1500 MG: 750 TABLET ORAL at 20:46

## 2023-09-17 RX ADMIN — DILTIAZEM HYDROCHLORIDE 10 MG: 5 INJECTION INTRAVENOUS at 08:13

## 2023-09-17 RX ADMIN — HEPARIN SODIUM 5000 UNITS: 10000 INJECTION INTRAVENOUS; SUBCUTANEOUS at 15:21

## 2023-09-17 RX ADMIN — METOPROLOL TARTRATE 5 MG: 5 INJECTION, SOLUTION INTRAVENOUS at 04:11

## 2023-09-17 RX ADMIN — HYDROMORPHONE HYDROCHLORIDE 0.5 MG: 1 INJECTION, SOLUTION INTRAMUSCULAR; INTRAVENOUS; SUBCUTANEOUS at 00:51

## 2023-09-17 RX ADMIN — METOPROLOL TARTRATE 50 MG: 50 TABLET ORAL at 09:57

## 2023-09-17 RX ADMIN — PHENOBARBITAL 64.8 MG: 32.4 TABLET ORAL at 04:13

## 2023-09-17 RX ADMIN — PHENOBARBITAL SODIUM 130 MG: 65 INJECTION INTRAMUSCULAR at 20:47

## 2023-09-17 ASSESSMENT — ENCOUNTER SYMPTOMS
EYES NEGATIVE: 1
VOMITING: 0
COUGH: 0
ABDOMINAL DISTENTION: 0
CONSTIPATION: 0
ALLERGIC/IMMUNOLOGIC NEGATIVE: 1
BACK PAIN: 1
GASTROINTESTINAL NEGATIVE: 1
ABDOMINAL PAIN: 0
RESPIRATORY NEGATIVE: 1
SHORTNESS OF BREATH: 0
BLOOD IN STOOL: 0
DIARRHEA: 0
NAUSEA: 0
ANAL BLEEDING: 0

## 2023-09-17 ASSESSMENT — PAIN DESCRIPTION - ORIENTATION: ORIENTATION: LEFT

## 2023-09-17 ASSESSMENT — PAIN DESCRIPTION - LOCATION
LOCATION: HIP;RIB CAGE;BACK
LOCATION: HIP;RIB CAGE;BACK
LOCATION: HIP;RIB CAGE

## 2023-09-17 ASSESSMENT — PAIN SCALES - GENERAL
PAINLEVEL_OUTOF10: 7
PAINLEVEL_OUTOF10: 10

## 2023-09-17 ASSESSMENT — PAIN - FUNCTIONAL ASSESSMENT: PAIN_FUNCTIONAL_ASSESSMENT: ACTIVITIES ARE NOT PREVENTED

## 2023-09-17 ASSESSMENT — PAIN DESCRIPTION - DESCRIPTORS: DESCRIPTORS: ACHING;CRAMPING

## 2023-09-17 NOTE — CONSULTS
No accessory muscle use or intercostal retractions  RESPIRATORY: Lung sounds -diminished throughout fields   CARDIOVASCULAR:     No carotid bruit  Heart Inspection- shows no noted pulsations  Heart Palpation- no heaves or thrills; PMI is non-displaced   Heart Ausculation-irregular rate and rhythm, no murmur. No s3, or rub   PV: No lower extremity edema. No varicosities. Pedal pulses palpable, no clubbing or cyanosis   ABDOMEN: Soft, non-tender to light palpation. Bowel sounds present. No palpable masses no organomegaly; no abdominal bruit  MS: Good muscle strength and tone. No atrophy or abnormal movements. : Deferred  SKIN: Warm and dry no statis dermatitis or ulcers   NEURO / PSYCH: Oriented to person, place and time. Speech clear and appropriate. Follows all commands.  Pleasant affect     DATA:    ECG: As above  Tele strips: Currently Atrial Fibrillation with HR in the 80s (IV Cardizem on hold as RN currently at the bedside trying to obtain IV access)  Diagnostic:      Intake/Output Summary (Last 24 hours) at 9/17/2023 0807  Last data filed at 9/16/2023 2000  Gross per 24 hour   Intake 1220 ml   Output 500 ml   Net 720 ml       Labs:   CBC:   Recent Labs     09/15/23  0843 09/16/23  0506 09/17/23  0434   WBC 9.8 7.1 10.9   HGB 14.4 13.8 15.6   HCT 42.0 39.2 45.5   *  --   --      BMP:   Recent Labs     09/16/23  0656 09/17/23  0434    135   K 3.8 3.8   CO2 25 24   BUN 10 11   CREATININE 0.6* 0.7   LABGLOM >60 >60   CALCIUM 7.9* 8.6   TSH:   Recent Labs     09/17/23  0644   TSH 11.55*   CARDIAC ENZYMES:  Recent Labs     09/15/23  0843 09/15/23  0927   TROPHS 30* 28*     LIVER PROFILE:  Recent Labs     09/16/23  0656 09/17/23  0434   AST 51* 62*   ALT 21 26   LABALBU 2.3* 2.6*      Latest Reference Range & Units 09/15/23 08:43 09/15/23 09:27   Pro-BNP 0 - 125 pg/mL 896 (H)    Troponin, High Sensitivity 0 - 11 ng/L 30 (H) 28 (H)   (H): Data is abnormally high    09/15/2023 CXR:  Left 9th rib

## 2023-09-18 LAB
ALBUMIN SERPL-MCNC: 2.3 G/DL (ref 3.5–5.2)
ALP SERPL-CCNC: 142 U/L (ref 40–129)
ALT SERPL-CCNC: 24 U/L (ref 0–40)
ANION GAP SERPL CALCULATED.3IONS-SCNC: 12 MMOL/L (ref 7–16)
AST SERPL-CCNC: 63 U/L (ref 0–39)
BACTERIA URNS QL MICRO: ABNORMAL
BASOPHILS # BLD: 0.13 K/UL (ref 0–0.2)
BASOPHILS NFR BLD: 1 % (ref 0–2)
BILIRUB SERPL-MCNC: 3.3 MG/DL (ref 0–1.2)
BILIRUB UR QL STRIP: ABNORMAL
BUN SERPL-MCNC: 12 MG/DL (ref 6–23)
CALCIUM SERPL-MCNC: 8.5 MG/DL (ref 8.6–10.2)
CASTS #/AREA URNS LPF: ABNORMAL /LPF
CHLORIDE SERPL-SCNC: 96 MMOL/L (ref 98–107)
CLARITY UR: CLEAR
CO2 SERPL-SCNC: 23 MMOL/L (ref 22–29)
COLOR UR: YELLOW
CREAT SERPL-MCNC: 0.7 MG/DL (ref 0.7–1.2)
EOSINOPHIL # BLD: 0.11 K/UL (ref 0.05–0.5)
EOSINOPHILS RELATIVE PERCENT: 1 % (ref 0–6)
ERYTHROCYTE [DISTWIDTH] IN BLOOD BY AUTOMATED COUNT: 13 % (ref 11.5–15)
GFR SERPL CREATININE-BSD FRML MDRD: >60 ML/MIN/1.73M2
GLUCOSE SERPL-MCNC: 75 MG/DL (ref 74–99)
GLUCOSE UR STRIP-MCNC: NEGATIVE MG/DL
HCT VFR BLD AUTO: 46.1 % (ref 37–54)
HGB BLD-MCNC: 16.2 G/DL (ref 12.5–16.5)
HGB UR QL STRIP.AUTO: ABNORMAL
IMM GRANULOCYTES # BLD AUTO: 0.05 K/UL (ref 0–0.58)
IMM GRANULOCYTES NFR BLD: 0 % (ref 0–5)
KETONES UR STRIP-MCNC: NEGATIVE MG/DL
LEUKOCYTE ESTERASE UR QL STRIP: NEGATIVE
LYMPHOCYTES NFR BLD: 1.2 K/UL (ref 1.5–4)
LYMPHOCYTES RELATIVE PERCENT: 10 % (ref 20–42)
MAGNESIUM SERPL-MCNC: 1.6 MG/DL (ref 1.6–2.6)
MCH RBC QN AUTO: 37.2 PG (ref 26–35)
MCHC RBC AUTO-ENTMCNC: 35.1 G/DL (ref 32–34.5)
MCV RBC AUTO: 106 FL (ref 80–99.9)
MONOCYTES NFR BLD: 1.44 K/UL (ref 0.1–0.95)
MONOCYTES NFR BLD: 12 % (ref 2–12)
NEUTROPHILS NFR BLD: 76 % (ref 43–80)
NEUTS SEG NFR BLD: 9.38 K/UL (ref 1.8–7.3)
NITRITE UR QL STRIP: NEGATIVE
PH UR STRIP: 6 [PH] (ref 5–9)
PHENOBARBITAL: 9.6 UG/ML (ref 15–40)
PLATELET # BLD AUTO: 179 K/UL (ref 130–450)
PMV BLD AUTO: 10.3 FL (ref 7–12)
POTASSIUM SERPL-SCNC: 4.1 MMOL/L (ref 3.5–5)
PROT SERPL-MCNC: 6.5 G/DL (ref 6.4–8.3)
PROT UR STRIP-MCNC: NEGATIVE MG/DL
RBC # BLD AUTO: 4.35 M/UL (ref 3.8–5.8)
RBC #/AREA URNS HPF: ABNORMAL /HPF
SODIUM SERPL-SCNC: 131 MMOL/L (ref 132–146)
SP GR UR STRIP: 1.01 (ref 1–1.03)
UROBILINOGEN UR STRIP-ACNC: 4 EU/DL (ref 0–1)
WBC #/AREA URNS HPF: ABNORMAL /HPF
WBC OTHER # BLD: 12.3 K/UL (ref 4.5–11.5)

## 2023-09-18 PROCEDURE — 6370000000 HC RX 637 (ALT 250 FOR IP)

## 2023-09-18 PROCEDURE — 2580000003 HC RX 258

## 2023-09-18 PROCEDURE — 97535 SELF CARE MNGMENT TRAINING: CPT

## 2023-09-18 PROCEDURE — 6360000002 HC RX W HCPCS

## 2023-09-18 PROCEDURE — 97530 THERAPEUTIC ACTIVITIES: CPT

## 2023-09-18 PROCEDURE — 6360000002 HC RX W HCPCS: Performed by: SURGERY

## 2023-09-18 PROCEDURE — 93306 TTE W/DOPPLER COMPLETE: CPT

## 2023-09-18 PROCEDURE — 2060000000 HC ICU INTERMEDIATE R&B

## 2023-09-18 PROCEDURE — 2500000003 HC RX 250 WO HCPCS: Performed by: SURGERY

## 2023-09-18 PROCEDURE — 36415 COLL VENOUS BLD VENIPUNCTURE: CPT

## 2023-09-18 PROCEDURE — 80184 ASSAY OF PHENOBARBITAL: CPT

## 2023-09-18 PROCEDURE — 80053 COMPREHEN METABOLIC PANEL: CPT

## 2023-09-18 PROCEDURE — 83735 ASSAY OF MAGNESIUM: CPT

## 2023-09-18 PROCEDURE — 99232 SBSQ HOSP IP/OBS MODERATE 35: CPT | Performed by: SURGERY

## 2023-09-18 PROCEDURE — 85025 COMPLETE CBC W/AUTO DIFF WBC: CPT

## 2023-09-18 RX ORDER — FUROSEMIDE 20 MG/1
20 TABLET ORAL DAILY
Status: DISCONTINUED | OUTPATIENT
Start: 2023-09-18 | End: 2023-09-21 | Stop reason: HOSPADM

## 2023-09-18 RX ORDER — MAGNESIUM SULFATE IN WATER 40 MG/ML
4000 INJECTION, SOLUTION INTRAVENOUS ONCE
Status: COMPLETED | OUTPATIENT
Start: 2023-09-18 | End: 2023-09-18

## 2023-09-18 RX ORDER — METOPROLOL TARTRATE 50 MG/1
100 TABLET, FILM COATED ORAL 2 TIMES DAILY
Status: DISCONTINUED | OUTPATIENT
Start: 2023-09-18 | End: 2023-09-21 | Stop reason: HOSPADM

## 2023-09-18 RX ADMIN — METHOCARBAMOL 1500 MG: 750 TABLET ORAL at 21:29

## 2023-09-18 RX ADMIN — METHOCARBAMOL 1500 MG: 750 TABLET ORAL at 10:23

## 2023-09-18 RX ADMIN — SODIUM CHLORIDE, PRESERVATIVE FREE 10 ML: 5 INJECTION INTRAVENOUS at 21:29

## 2023-09-18 RX ADMIN — SODIUM CHLORIDE, PRESERVATIVE FREE 10 ML: 5 INJECTION INTRAVENOUS at 10:25

## 2023-09-18 RX ADMIN — PHENOBARBITAL SODIUM 130 MG: 65 INJECTION INTRAMUSCULAR at 10:54

## 2023-09-18 RX ADMIN — SENNOSIDES AND DOCUSATE SODIUM 1 TABLET: 50; 8.6 TABLET ORAL at 10:23

## 2023-09-18 RX ADMIN — OXYCODONE HYDROCHLORIDE 10 MG: 10 TABLET ORAL at 10:36

## 2023-09-18 RX ADMIN — METHOCARBAMOL 1500 MG: 750 TABLET ORAL at 16:44

## 2023-09-18 RX ADMIN — SENNOSIDES AND DOCUSATE SODIUM 1 TABLET: 50; 8.6 TABLET ORAL at 21:29

## 2023-09-18 RX ADMIN — PHENOBARBITAL SODIUM 130 MG: 65 INJECTION INTRAMUSCULAR at 21:29

## 2023-09-18 RX ADMIN — PHENOBARBITAL SODIUM 130 MG: 65 INJECTION INTRAMUSCULAR at 02:52

## 2023-09-18 RX ADMIN — GABAPENTIN 300 MG: 300 CAPSULE ORAL at 10:23

## 2023-09-18 RX ADMIN — FUROSEMIDE 20 MG: 20 TABLET ORAL at 10:23

## 2023-09-18 RX ADMIN — PHENOBARBITAL SODIUM 130 MG: 65 INJECTION INTRAMUSCULAR at 16:45

## 2023-09-18 RX ADMIN — METOPROLOL TARTRATE 100 MG: 50 TABLET ORAL at 21:29

## 2023-09-18 RX ADMIN — MAGNESIUM SULFATE HEPTAHYDRATE 4000 MG: 40 INJECTION, SOLUTION INTRAVENOUS at 11:00

## 2023-09-18 RX ADMIN — ACETAMINOPHEN 1000 MG: 500 TABLET ORAL at 16:44

## 2023-09-18 RX ADMIN — FOLIC ACID 1 MG: 5 INJECTION, SOLUTION INTRAMUSCULAR; INTRAVENOUS; SUBCUTANEOUS at 10:55

## 2023-09-18 RX ADMIN — METOPROLOL TARTRATE 50 MG: 50 TABLET ORAL at 10:22

## 2023-09-18 RX ADMIN — GABAPENTIN 300 MG: 300 CAPSULE ORAL at 21:28

## 2023-09-18 RX ADMIN — HEPARIN SODIUM 5000 UNITS: 10000 INJECTION INTRAVENOUS; SUBCUTANEOUS at 16:50

## 2023-09-18 ASSESSMENT — PAIN DESCRIPTION - DESCRIPTORS
DESCRIPTORS: ACHING;CRAMPING
DESCRIPTORS: ACHING;PRESSURE;JABBING

## 2023-09-18 ASSESSMENT — PAIN DESCRIPTION - LOCATION
LOCATION: HIP;RIB CAGE
LOCATION: HIP

## 2023-09-18 ASSESSMENT — PAIN SCALES - GENERAL
PAINLEVEL_OUTOF10: 8
PAINLEVEL_OUTOF10: 5
PAINLEVEL_OUTOF10: 9
PAINLEVEL_OUTOF10: 7

## 2023-09-18 ASSESSMENT — PAIN - FUNCTIONAL ASSESSMENT: PAIN_FUNCTIONAL_ASSESSMENT: ACTIVITIES ARE NOT PREVENTED

## 2023-09-18 ASSESSMENT — PAIN DESCRIPTION - ORIENTATION
ORIENTATION: LEFT
ORIENTATION: LEFT

## 2023-09-18 NOTE — ACP (ADVANCE CARE PLANNING)
Advance Care Planning   Healthcare Decision Maker:    Primary Decision Maker: jailyn khan - Brother/Sister - [phone]    Click here to complete Healthcare Decision Makers including selection of the Healthcare Decision Maker Relationship (ie \"Primary\").

## 2023-09-19 ENCOUNTER — TELEPHONE (OUTPATIENT)
Dept: CARDIOLOGY CLINIC | Age: 63
End: 2023-09-19

## 2023-09-19 DIAGNOSIS — I48.0 PAF (PAROXYSMAL ATRIAL FIBRILLATION) (HCC): Primary | ICD-10-CM

## 2023-09-19 LAB
ALBUMIN SERPL-MCNC: 2.6 G/DL (ref 3.5–5.2)
ALP SERPL-CCNC: 141 U/L (ref 40–129)
ALT SERPL-CCNC: 26 U/L (ref 0–40)
ANION GAP SERPL CALCULATED.3IONS-SCNC: 5 MMOL/L (ref 7–16)
AST SERPL-CCNC: 58 U/L (ref 0–39)
BASOPHILS # BLD: 0 K/UL (ref 0–0.2)
BASOPHILS NFR BLD: 0 % (ref 0–2)
BILIRUB SERPL-MCNC: 3 MG/DL (ref 0–1.2)
BUN SERPL-MCNC: 12 MG/DL (ref 6–23)
CALCIUM SERPL-MCNC: 8.4 MG/DL (ref 8.6–10.2)
CHLORIDE SERPL-SCNC: 99 MMOL/L (ref 98–107)
CO2 SERPL-SCNC: 26 MMOL/L (ref 22–29)
CREAT SERPL-MCNC: 0.7 MG/DL (ref 0.7–1.2)
EOSINOPHIL # BLD: 0.1 K/UL (ref 0.05–0.5)
EOSINOPHILS RELATIVE PERCENT: 1 % (ref 0–6)
ERYTHROCYTE [DISTWIDTH] IN BLOOD BY AUTOMATED COUNT: 13.2 % (ref 11.5–15)
GFR SERPL CREATININE-BSD FRML MDRD: >60 ML/MIN/1.73M2
GLUCOSE SERPL-MCNC: 73 MG/DL (ref 74–99)
HCT VFR BLD AUTO: 42.9 % (ref 37–54)
HGB BLD-MCNC: 15.1 G/DL (ref 12.5–16.5)
LYMPHOCYTES NFR BLD: 0.39 K/UL (ref 1.5–4)
LYMPHOCYTES RELATIVE PERCENT: 4 % (ref 20–42)
MAGNESIUM SERPL-MCNC: 2.2 MG/DL (ref 1.6–2.6)
MCH RBC QN AUTO: 36.8 PG (ref 26–35)
MCHC RBC AUTO-ENTMCNC: 35.2 G/DL (ref 32–34.5)
MCV RBC AUTO: 104.6 FL (ref 80–99.9)
MONOCYTES NFR BLD: 1.28 K/UL (ref 0.1–0.95)
MONOCYTES NFR BLD: 11 % (ref 2–12)
NEUTROPHILS NFR BLD: 84 % (ref 43–80)
NEUTS SEG NFR BLD: 9.53 K/UL (ref 1.8–7.3)
PLATELET # BLD AUTO: 180 K/UL (ref 130–450)
PMV BLD AUTO: 10.5 FL (ref 7–12)
POTASSIUM SERPL-SCNC: 4.3 MMOL/L (ref 3.5–5)
PROT SERPL-MCNC: 6.3 G/DL (ref 6.4–8.3)
RBC # BLD AUTO: 4.1 M/UL (ref 3.8–5.8)
RBC # BLD: ABNORMAL 10*6/UL
SODIUM SERPL-SCNC: 130 MMOL/L (ref 132–146)
WBC OTHER # BLD: 11.3 K/UL (ref 4.5–11.5)

## 2023-09-19 PROCEDURE — 2060000000 HC ICU INTERMEDIATE R&B

## 2023-09-19 PROCEDURE — 94669 MECHANICAL CHEST WALL OSCILL: CPT

## 2023-09-19 PROCEDURE — 6370000000 HC RX 637 (ALT 250 FOR IP)

## 2023-09-19 PROCEDURE — 6360000002 HC RX W HCPCS

## 2023-09-19 PROCEDURE — 83735 ASSAY OF MAGNESIUM: CPT

## 2023-09-19 PROCEDURE — 97530 THERAPEUTIC ACTIVITIES: CPT

## 2023-09-19 PROCEDURE — 36415 COLL VENOUS BLD VENIPUNCTURE: CPT

## 2023-09-19 PROCEDURE — 85025 COMPLETE CBC W/AUTO DIFF WBC: CPT

## 2023-09-19 PROCEDURE — 99232 SBSQ HOSP IP/OBS MODERATE 35: CPT | Performed by: SURGERY

## 2023-09-19 PROCEDURE — 80053 COMPREHEN METABOLIC PANEL: CPT

## 2023-09-19 RX ORDER — PHENOBARBITAL SODIUM 65 MG/ML
65 INJECTION INTRAMUSCULAR EVERY 6 HOURS
Status: DISCONTINUED | OUTPATIENT
Start: 2023-09-19 | End: 2023-09-19

## 2023-09-19 RX ORDER — PHENOBARBITAL 32.4 MG/1
64.8 TABLET ORAL EVERY 6 HOURS
Status: DISCONTINUED | OUTPATIENT
Start: 2023-09-19 | End: 2023-09-20

## 2023-09-19 RX ORDER — ZINC OXIDE 20 %
OINTMENT (GRAM) TOPICAL 4 TIMES DAILY PRN
Status: DISCONTINUED | OUTPATIENT
Start: 2023-09-19 | End: 2023-09-21 | Stop reason: HOSPADM

## 2023-09-19 RX ADMIN — GABAPENTIN 300 MG: 300 CAPSULE ORAL at 21:32

## 2023-09-19 RX ADMIN — ACETAMINOPHEN 1000 MG: 500 TABLET ORAL at 21:33

## 2023-09-19 RX ADMIN — OXYCODONE HYDROCHLORIDE 10 MG: 10 TABLET ORAL at 04:31

## 2023-09-19 RX ADMIN — SENNOSIDES AND DOCUSATE SODIUM 1 TABLET: 50; 8.6 TABLET ORAL at 10:46

## 2023-09-19 RX ADMIN — MULTIVITAMIN TABLET 1 TABLET: TABLET at 08:30

## 2023-09-19 RX ADMIN — PHENOBARBITAL 64.8 MG: 32.4 TABLET ORAL at 17:00

## 2023-09-19 RX ADMIN — GABAPENTIN 300 MG: 300 CAPSULE ORAL at 10:46

## 2023-09-19 RX ADMIN — APIXABAN 5 MG: 5 TABLET, FILM COATED ORAL at 21:33

## 2023-09-19 RX ADMIN — METHOCARBAMOL 1500 MG: 750 TABLET ORAL at 17:06

## 2023-09-19 RX ADMIN — SENNOSIDES AND DOCUSATE SODIUM 1 TABLET: 50; 8.6 TABLET ORAL at 21:32

## 2023-09-19 RX ADMIN — METOPROLOL TARTRATE 100 MG: 50 TABLET ORAL at 10:46

## 2023-09-19 RX ADMIN — PHENOBARBITAL SODIUM 65 MG: 65 INJECTION INTRAMUSCULAR at 03:02

## 2023-09-19 RX ADMIN — PHENOBARBITAL 64.8 MG: 32.4 TABLET ORAL at 21:33

## 2023-09-19 RX ADMIN — METOPROLOL TARTRATE 100 MG: 50 TABLET ORAL at 21:32

## 2023-09-19 RX ADMIN — APIXABAN 5 MG: 5 TABLET, FILM COATED ORAL at 10:46

## 2023-09-19 RX ADMIN — FUROSEMIDE 20 MG: 20 TABLET ORAL at 10:47

## 2023-09-19 RX ADMIN — OXYCODONE HYDROCHLORIDE 5 MG: 5 TABLET ORAL at 17:20

## 2023-09-19 RX ADMIN — PHENOBARBITAL SODIUM 65 MG: 65 INJECTION INTRAMUSCULAR at 09:35

## 2023-09-19 RX ADMIN — METHOCARBAMOL 1500 MG: 750 TABLET ORAL at 21:32

## 2023-09-19 RX ADMIN — METHOCARBAMOL 1500 MG: 750 TABLET ORAL at 10:46

## 2023-09-19 RX ADMIN — OXYCODONE HYDROCHLORIDE 10 MG: 10 TABLET ORAL at 00:38

## 2023-09-19 ASSESSMENT — PAIN SCALES - GENERAL
PAINLEVEL_OUTOF10: 10
PAINLEVEL_OUTOF10: 7
PAINLEVEL_OUTOF10: 6
PAINLEVEL_OUTOF10: 8
PAINLEVEL_OUTOF10: 4

## 2023-09-19 ASSESSMENT — PAIN DESCRIPTION - LOCATION
LOCATION: HIP;RIB CAGE
LOCATION: BACK;HIP
LOCATION: HIP;BACK
LOCATION: HIP;FLANK;BACK
LOCATION: HIP;BACK
LOCATION: HIP;RIB CAGE

## 2023-09-19 ASSESSMENT — PAIN DESCRIPTION - DESCRIPTORS
DESCRIPTORS: ACHING;CRAMPING;DISCOMFORT
DESCRIPTORS: ACHING;CRAMPING;DISCOMFORT
DESCRIPTORS: ACHING
DESCRIPTORS: ACHING;DISCOMFORT;NAGGING

## 2023-09-19 ASSESSMENT — PAIN DESCRIPTION - ORIENTATION
ORIENTATION: LEFT

## 2023-09-19 NOTE — TELEPHONE ENCOUNTER
----- Message from Jennifer Cedeño MD sent at 9/17/2023  4:25 PM EDT -----  Can we set him up for a NATHAN cardioversion next week at [facility]?    He will also need an office visit with me in 2 to 3 weeks after discharge. This can be done as a video visit. Thank you.

## 2023-09-19 NOTE — TELEPHONE ENCOUNTER
NATHAN/ Cardioversion scheduled instructions given.   PT is [address] [facility] follow scheduled

## 2023-09-20 LAB
ALBUMIN SERPL-MCNC: 2.1 G/DL (ref 3.5–5.2)
ALP SERPL-CCNC: 144 U/L (ref 40–129)
ALT SERPL-CCNC: 22 U/L (ref 0–40)
ANION GAP SERPL CALCULATED.3IONS-SCNC: 10 MMOL/L (ref 7–16)
AST SERPL-CCNC: 42 U/L (ref 0–39)
BASOPHILS # BLD: 0.02 K/UL (ref 0–0.2)
BASOPHILS NFR BLD: 0 % (ref 0–2)
BILIRUB SERPL-MCNC: 2.3 MG/DL (ref 0–1.2)
BUN SERPL-MCNC: 10 MG/DL (ref 6–23)
CALCIUM SERPL-MCNC: 8.1 MG/DL (ref 8.6–10.2)
CHLORIDE SERPL-SCNC: 97 MMOL/L (ref 98–107)
CO2 SERPL-SCNC: 25 MMOL/L (ref 22–29)
CREAT SERPL-MCNC: 0.6 MG/DL (ref 0.7–1.2)
EOSINOPHIL # BLD: 0.49 K/UL (ref 0.05–0.5)
EOSINOPHILS RELATIVE PERCENT: 3 % (ref 0–6)
ERYTHROCYTE [DISTWIDTH] IN BLOOD BY AUTOMATED COUNT: 13.3 % (ref 11.5–15)
GFR SERPL CREATININE-BSD FRML MDRD: >60 ML/MIN/1.73M2
GLUCOSE SERPL-MCNC: 90 MG/DL (ref 74–99)
HCT VFR BLD AUTO: 41.3 % (ref 37–54)
HGB BLD-MCNC: 14.6 G/DL (ref 12.5–16.5)
IMM GRANULOCYTES # BLD AUTO: 0.1 K/UL (ref 0–0.58)
IMM GRANULOCYTES NFR BLD: 1 % (ref 0–5)
LYMPHOCYTES NFR BLD: 0.72 K/UL (ref 1.5–4)
LYMPHOCYTES RELATIVE PERCENT: 5 % (ref 20–42)
MAGNESIUM SERPL-MCNC: 1.8 MG/DL (ref 1.6–2.6)
MCH RBC QN AUTO: 37 PG (ref 26–35)
MCHC RBC AUTO-ENTMCNC: 35.4 G/DL (ref 32–34.5)
MCV RBC AUTO: 104.6 FL (ref 80–99.9)
MONOCYTES NFR BLD: 1.77 K/UL (ref 0.1–0.95)
MONOCYTES NFR BLD: 11 % (ref 2–12)
NEUTROPHILS NFR BLD: 81 % (ref 43–80)
NEUTS SEG NFR BLD: 12.91 K/UL (ref 1.8–7.3)
PLATELET # BLD AUTO: 176 K/UL (ref 130–450)
PMV BLD AUTO: 10.5 FL (ref 7–12)
POTASSIUM SERPL-SCNC: 4 MMOL/L (ref 3.5–5)
PROT SERPL-MCNC: 5.6 G/DL (ref 6.4–8.3)
RBC # BLD AUTO: 3.95 M/UL (ref 3.8–5.8)
RBC # BLD: ABNORMAL 10*6/UL
SODIUM SERPL-SCNC: 132 MMOL/L (ref 132–146)
WBC OTHER # BLD: 16 K/UL (ref 4.5–11.5)

## 2023-09-20 PROCEDURE — 80053 COMPREHEN METABOLIC PANEL: CPT

## 2023-09-20 PROCEDURE — 36415 COLL VENOUS BLD VENIPUNCTURE: CPT

## 2023-09-20 PROCEDURE — 6370000000 HC RX 637 (ALT 250 FOR IP)

## 2023-09-20 PROCEDURE — 97530 THERAPEUTIC ACTIVITIES: CPT

## 2023-09-20 PROCEDURE — 83735 ASSAY OF MAGNESIUM: CPT

## 2023-09-20 PROCEDURE — 2500000003 HC RX 250 WO HCPCS: Performed by: SURGERY

## 2023-09-20 PROCEDURE — 2580000003 HC RX 258

## 2023-09-20 PROCEDURE — 2060000000 HC ICU INTERMEDIATE R&B

## 2023-09-20 PROCEDURE — 85025 COMPLETE CBC W/AUTO DIFF WBC: CPT

## 2023-09-20 RX ORDER — NYSTATIN 100000 U/G
OINTMENT TOPICAL 2 TIMES DAILY
Status: DISCONTINUED | OUTPATIENT
Start: 2023-09-20 | End: 2023-09-21

## 2023-09-20 RX ORDER — DIPHENHYDRAMINE HCL 25 MG
25 TABLET ORAL EVERY 6 HOURS PRN
Status: DISCONTINUED | OUTPATIENT
Start: 2023-09-20 | End: 2023-09-21 | Stop reason: HOSPADM

## 2023-09-20 RX ORDER — DIPHENHYDRAMINE HYDROCHLORIDE 50 MG/ML
25 INJECTION INTRAMUSCULAR; INTRAVENOUS EVERY 6 HOURS PRN
Status: DISCONTINUED | OUTPATIENT
Start: 2023-09-20 | End: 2023-09-20

## 2023-09-20 RX ORDER — SODIUM CHLORIDE 1 G/1
1 TABLET ORAL
Status: DISCONTINUED | OUTPATIENT
Start: 2023-09-20 | End: 2023-09-21 | Stop reason: HOSPADM

## 2023-09-20 RX ADMIN — NYSTATIN OINTMENT: 100000 OINTMENT TOPICAL at 20:59

## 2023-09-20 RX ADMIN — METHOCARBAMOL 1500 MG: 750 TABLET ORAL at 16:19

## 2023-09-20 RX ADMIN — FOLIC ACID 1 MG: 5 INJECTION, SOLUTION INTRAMUSCULAR; INTRAVENOUS; SUBCUTANEOUS at 12:00

## 2023-09-20 RX ADMIN — ACETAMINOPHEN 1000 MG: 500 TABLET ORAL at 06:30

## 2023-09-20 RX ADMIN — APIXABAN 5 MG: 5 TABLET, FILM COATED ORAL at 20:59

## 2023-09-20 RX ADMIN — NYSTATIN OINTMENT: 100000 OINTMENT TOPICAL at 11:00

## 2023-09-20 RX ADMIN — RUGBY ZINC OXIDE 20%: 20 OINTMENT TOPICAL at 14:25

## 2023-09-20 RX ADMIN — SODIUM CHLORIDE, PRESERVATIVE FREE 10 ML: 5 INJECTION INTRAVENOUS at 14:19

## 2023-09-20 RX ADMIN — FUROSEMIDE 20 MG: 20 TABLET ORAL at 10:54

## 2023-09-20 RX ADMIN — GABAPENTIN 300 MG: 300 CAPSULE ORAL at 20:58

## 2023-09-20 RX ADMIN — Medication 100 MG: at 10:53

## 2023-09-20 RX ADMIN — SENNOSIDES AND DOCUSATE SODIUM 1 TABLET: 50; 8.6 TABLET ORAL at 20:59

## 2023-09-20 RX ADMIN — SODIUM CHLORIDE 1 G: 1 TABLET ORAL at 14:17

## 2023-09-20 RX ADMIN — METOPROLOL TARTRATE 100 MG: 50 TABLET ORAL at 20:59

## 2023-09-20 RX ADMIN — SODIUM CHLORIDE 1 G: 1 TABLET ORAL at 10:53

## 2023-09-20 RX ADMIN — SENNOSIDES AND DOCUSATE SODIUM 1 TABLET: 50; 8.6 TABLET ORAL at 10:54

## 2023-09-20 RX ADMIN — OXYCODONE HYDROCHLORIDE 10 MG: 10 TABLET ORAL at 21:51

## 2023-09-20 RX ADMIN — METHOCARBAMOL 1500 MG: 750 TABLET ORAL at 20:59

## 2023-09-20 RX ADMIN — METOPROLOL TARTRATE 100 MG: 50 TABLET ORAL at 10:54

## 2023-09-20 RX ADMIN — METHOCARBAMOL 1500 MG: 750 TABLET ORAL at 10:53

## 2023-09-20 RX ADMIN — MULTIVITAMIN TABLET 1 TABLET: TABLET at 10:54

## 2023-09-20 RX ADMIN — METHOCARBAMOL 1500 MG: 750 TABLET ORAL at 14:17

## 2023-09-20 RX ADMIN — GABAPENTIN 300 MG: 300 CAPSULE ORAL at 10:53

## 2023-09-20 RX ADMIN — APIXABAN 5 MG: 5 TABLET, FILM COATED ORAL at 10:54

## 2023-09-20 RX ADMIN — OXYCODONE HYDROCHLORIDE 10 MG: 10 TABLET ORAL at 10:53

## 2023-09-20 RX ADMIN — SODIUM CHLORIDE 1 G: 1 TABLET ORAL at 16:19

## 2023-09-20 ASSESSMENT — PAIN DESCRIPTION - LOCATION
LOCATION: GENERALIZED
LOCATION: BACK
LOCATION: BACK;HIP

## 2023-09-20 ASSESSMENT — PAIN SCALES - GENERAL
PAINLEVEL_OUTOF10: 0
PAINLEVEL_OUTOF10: 0
PAINLEVEL_OUTOF10: 10
PAINLEVEL_OUTOF10: 6
PAINLEVEL_OUTOF10: 8
PAINLEVEL_OUTOF10: 0
PAINLEVEL_OUTOF10: 5
PAINLEVEL_OUTOF10: 9

## 2023-09-20 ASSESSMENT — PAIN DESCRIPTION - DESCRIPTORS
DESCRIPTORS: ACHING;TENDER;SHARP
DESCRIPTORS: ACHING

## 2023-09-20 NOTE — PLAN OF CARE
Problem: ABCDS Injury Assessment  Goal: Absence of physical injury  9/20/2023 0415 by Anshul Gutierrez RN  Outcome: Progressing  9/20/2023 0415 by Anshul Gutierrez RN  Outcome: Progressing  Flowsheets (Taken 9/20/2023 0000)  Absence of Physical Injury: Implement safety measures based on patient assessment     Problem: Safety - Adult  Goal: Free from fall injury  9/20/2023 0415 by Anshul Gutierrez RN  Outcome: Progressing  9/20/2023 0415 by Anshul Gutierrez RN  Outcome: Progressing  Flowsheets (Taken 9/20/2023 0000)  Free From Fall Injury: Instruct family/caregiver on patient safety

## 2023-09-20 NOTE — PLAN OF CARE
Problem: Discharge Planning  Goal: Discharge to home or other facility with appropriate resources  9/20/2023 1546 by Romeo Rosenbegr RN  Outcome: Progressing  Flowsheets (Taken 9/20/2023 0800)  Discharge to home or other facility with appropriate resources: Identify barriers to discharge with patient and caregiver  9/20/2023 0415 by Chyrel Saint, RN  Outcome: Progressing     Problem: ABCDS Injury Assessment  Goal: Absence of physical injury  9/20/2023 1546 by Romeo Rosenberg RN  Outcome: Progressing  9/20/2023 0415 by Chyrel Saint, RN  Outcome: Progressing  9/20/2023 0415 by Chyrel Saint, RN  Outcome: Progressing  Flowsheets (Taken 9/20/2023 0000)  Absence of Physical Injury: Implement safety measures based on patient assessment     Problem: Safety - Adult  Goal: Free from fall injury  9/20/2023 1546 by Romeo Rosenberg RN  Outcome: Progressing  9/20/2023 0415 by Chyrel Saint, RN  Outcome: Progressing  9/20/2023 0415 by Chyrel Saint, RN  Outcome: Progressing  Flowsheets (Taken 9/20/2023 0000)  Free From Fall Injury: Instruct family/caregiver on patient safety     Problem: Skin/Tissue Integrity  Goal: Absence of new skin breakdown  Description: 1. Monitor for areas of redness and/or skin breakdown  2. Assess vascular access sites hourly  3. Every 4-6 hours minimum:  Change oxygen saturation probe site  4. Every 4-6 hours:  If on nasal continuous positive airway pressure, respiratory therapy assess nares and determine need for appliance change or resting period.   Outcome: Progressing     Problem: Pain  Goal: Verbalizes/displays adequate comfort level or baseline comfort level  Outcome: Progressing

## 2023-09-20 NOTE — DISCHARGE INSTR - COC
Risk Assessment Score:  Readmission Risk              Risk of Unplanned Readmission:  12           Discharging to Facility/ Agency   Name: JAIRO  Address:  Phone:  Fax:    Dialysis Facility (if applicable)   Name:  Address:  Dialysis Schedule:  Phone:  Fax:    / signature: Electronically signed by Mayo Huang RN on 9/20/23 at 3:19 PM EDT    PHYSICIAN SECTION    Prognosis: {Prognosis:5472592279}    Condition at Discharge: [address] Patient Condition:[id number]}    Rehab Potential (if transferring to Rehab): {Prognosis:4990986338}    Recommended Labs or Other Treatments After Discharge: ***    Physician Certification: I certify the above information and transfer of Isabel Ann  is necessary for the continuing treatment of the diagnosis listed and that he requires [address] for less 30 days.      Update Admission H&P: {CHP DME Changes in HGBVQ:391681707}    PHYSICIAN SIGNATURE:  {Esignature:[id number]}

## 2023-09-21 VITALS
DIASTOLIC BLOOD PRESSURE: 74 MMHG | HEART RATE: 87 BPM | BODY MASS INDEX: 29.03 KG/M2 | HEIGHT: 75 IN | TEMPERATURE: 98 F | SYSTOLIC BLOOD PRESSURE: 105 MMHG | WEIGHT: 233.5 LBS | OXYGEN SATURATION: 94 % | RESPIRATION RATE: 20 BRPM

## 2023-09-21 LAB
ALBUMIN SERPL-MCNC: 2.2 G/DL (ref 3.5–5.2)
ALP SERPL-CCNC: 135 U/L (ref 40–129)
ALT SERPL-CCNC: 20 U/L (ref 0–40)
ANION GAP SERPL CALCULATED.3IONS-SCNC: 7 MMOL/L (ref 7–16)
AST SERPL-CCNC: 37 U/L (ref 0–39)
BASOPHILS # BLD: 0.03 K/UL (ref 0–0.2)
BASOPHILS NFR BLD: 0 % (ref 0–2)
BILIRUB SERPL-MCNC: 2.1 MG/DL (ref 0–1.2)
BUN SERPL-MCNC: 11 MG/DL (ref 6–23)
CALCIUM SERPL-MCNC: 8.1 MG/DL (ref 8.6–10.2)
CHLORIDE SERPL-SCNC: 99 MMOL/L (ref 98–107)
CO2 SERPL-SCNC: 27 MMOL/L (ref 22–29)
CREAT SERPL-MCNC: 0.8 MG/DL (ref 0.7–1.2)
EOSINOPHIL # BLD: 0.57 K/UL (ref 0.05–0.5)
EOSINOPHILS RELATIVE PERCENT: 4 % (ref 0–6)
ERYTHROCYTE [DISTWIDTH] IN BLOOD BY AUTOMATED COUNT: 13.9 % (ref 11.5–15)
GFR SERPL CREATININE-BSD FRML MDRD: >60 ML/MIN/1.73M2
GLUCOSE SERPL-MCNC: 77 MG/DL (ref 74–99)
HCT VFR BLD AUTO: 43.8 % (ref 37–54)
HGB BLD-MCNC: 15.2 G/DL (ref 12.5–16.5)
IMM GRANULOCYTES # BLD AUTO: 0.1 K/UL (ref 0–0.58)
IMM GRANULOCYTES NFR BLD: 1 % (ref 0–5)
LYMPHOCYTES NFR BLD: 0.73 K/UL (ref 1.5–4)
LYMPHOCYTES RELATIVE PERCENT: 5 % (ref 20–42)
MAGNESIUM SERPL-MCNC: 1.7 MG/DL (ref 1.6–2.6)
MCH RBC QN AUTO: 37 PG (ref 26–35)
MCHC RBC AUTO-ENTMCNC: 34.7 G/DL (ref 32–34.5)
MCV RBC AUTO: 106.6 FL (ref 80–99.9)
MONOCYTES NFR BLD: 1.54 K/UL (ref 0.1–0.95)
MONOCYTES NFR BLD: 10 % (ref 2–12)
NEUTROPHILS NFR BLD: 81 % (ref 43–80)
NEUTS SEG NFR BLD: 12.72 K/UL (ref 1.8–7.3)
PLATELET # BLD AUTO: 196 K/UL (ref 130–450)
PMV BLD AUTO: 10.3 FL (ref 7–12)
POTASSIUM SERPL-SCNC: 4.4 MMOL/L (ref 3.5–5)
PROT SERPL-MCNC: 5.8 G/DL (ref 6.4–8.3)
RBC # BLD AUTO: 4.11 M/UL (ref 3.8–5.8)
RBC # BLD: ABNORMAL 10*6/UL
SODIUM SERPL-SCNC: 133 MMOL/L (ref 132–146)
WBC OTHER # BLD: 15.7 K/UL (ref 4.5–11.5)

## 2023-09-21 PROCEDURE — 80053 COMPREHEN METABOLIC PANEL: CPT

## 2023-09-21 PROCEDURE — 6370000000 HC RX 637 (ALT 250 FOR IP)

## 2023-09-21 PROCEDURE — 6360000002 HC RX W HCPCS

## 2023-09-21 PROCEDURE — 36415 COLL VENOUS BLD VENIPUNCTURE: CPT

## 2023-09-21 PROCEDURE — 2500000003 HC RX 250 WO HCPCS: Performed by: SURGERY

## 2023-09-21 PROCEDURE — 85025 COMPLETE CBC W/AUTO DIFF WBC: CPT

## 2023-09-21 PROCEDURE — 6360000002 HC RX W HCPCS: Performed by: NURSE PRACTITIONER

## 2023-09-21 PROCEDURE — 99232 SBSQ HOSP IP/OBS MODERATE 35: CPT | Performed by: SURGERY

## 2023-09-21 PROCEDURE — 83735 ASSAY OF MAGNESIUM: CPT

## 2023-09-21 PROCEDURE — 2580000003 HC RX 258

## 2023-09-21 RX ORDER — METHYLPREDNISOLONE 4 MG/1
4 TABLET ORAL
Qty: 2 TABLET | Refills: 0 | Status: SHIPPED | OUTPATIENT
Start: 2023-09-22 | End: 2023-09-24

## 2023-09-21 RX ORDER — METHYLPREDNISOLONE 4 MG/1
4 TABLET ORAL
Status: DISCONTINUED | OUTPATIENT
Start: 2023-09-22 | End: 2023-09-21 | Stop reason: HOSPADM

## 2023-09-21 RX ORDER — DIPHENHYDRAMINE HCL 25 MG
25 TABLET ORAL EVERY 6 HOURS PRN
Qty: 30 TABLET | Refills: 0 | Status: SHIPPED | OUTPATIENT
Start: 2023-09-21 | End: 2023-10-21

## 2023-09-21 RX ORDER — MAGNESIUM SULFATE IN WATER 40 MG/ML
2000 INJECTION, SOLUTION INTRAVENOUS ONCE
Status: COMPLETED | OUTPATIENT
Start: 2023-09-21 | End: 2023-09-21

## 2023-09-21 RX ORDER — METHYLPREDNISOLONE 4 MG/1
8 TABLET ORAL NIGHTLY
Status: DISCONTINUED | OUTPATIENT
Start: 2023-09-22 | End: 2023-09-21 | Stop reason: HOSPADM

## 2023-09-21 RX ORDER — FUROSEMIDE 20 MG/1
20 TABLET ORAL DAILY
Qty: 60 TABLET | Refills: 3 | Status: SHIPPED | OUTPATIENT
Start: 2023-09-22

## 2023-09-21 RX ORDER — SENNA AND DOCUSATE SODIUM 50; 8.6 MG/1; MG/1
1 TABLET, FILM COATED ORAL 2 TIMES DAILY
Qty: 30 TABLET | Refills: 0 | Status: SHIPPED | OUTPATIENT
Start: 2023-09-21

## 2023-09-21 RX ORDER — METHYLPREDNISOLONE 4 MG/1
4 TABLET ORAL NIGHTLY
Qty: 3 TABLET | Refills: 0 | Status: SHIPPED | OUTPATIENT
Start: 2023-09-23 | End: 2023-09-26

## 2023-09-21 RX ORDER — METHYLPREDNISOLONE 4 MG/1
24 TABLET ORAL ONCE
Status: COMPLETED | OUTPATIENT
Start: 2023-09-21 | End: 2023-09-21

## 2023-09-21 RX ORDER — ZINC OXIDE 20 %
OINTMENT (GRAM) TOPICAL
Qty: 28 G | Refills: 1 | Status: SHIPPED | OUTPATIENT
Start: 2023-09-21

## 2023-09-21 RX ORDER — METHYLPREDNISOLONE 8 MG/1
8 TABLET ORAL NIGHTLY
Qty: 1 TABLET | Refills: 0 | Status: SHIPPED | OUTPATIENT
Start: 2023-09-22 | End: 2023-09-23

## 2023-09-21 RX ORDER — LIDOCAINE 4 G/G
1 PATCH TOPICAL DAILY PRN
Qty: 3 PATCH | Refills: 0 | Status: SHIPPED | OUTPATIENT
Start: 2023-09-21

## 2023-09-21 RX ORDER — DIAPER,BRIEF,INFANT-TODD,DISP
EACH MISCELLANEOUS
Qty: 30 G | Refills: 1 | Status: SHIPPED | OUTPATIENT
Start: 2023-09-21 | End: 2023-09-28

## 2023-09-21 RX ORDER — DIAPER,BRIEF,INFANT-TODD,DISP
EACH MISCELLANEOUS 2 TIMES DAILY
Status: DISCONTINUED | OUTPATIENT
Start: 2023-09-21 | End: 2023-09-21 | Stop reason: HOSPADM

## 2023-09-21 RX ORDER — METHYLPREDNISOLONE 4 MG/1
4 TABLET ORAL
Qty: 5 TABLET | Refills: 0 | Status: SHIPPED | OUTPATIENT
Start: 2023-09-22 | End: 2023-09-27

## 2023-09-21 RX ORDER — METHYLPREDNISOLONE 4 MG/1
4 TABLET ORAL
Qty: 3 TABLET | Refills: 0 | Status: SHIPPED | OUTPATIENT
Start: 2023-09-22 | End: 2023-09-25

## 2023-09-21 RX ORDER — METHOCARBAMOL 750 MG/1
1500 TABLET, FILM COATED ORAL 4 TIMES DAILY PRN
Qty: 80 TABLET | Refills: 0 | Status: SHIPPED | OUTPATIENT
Start: 2023-09-21 | End: 2023-10-01

## 2023-09-21 RX ORDER — METHYLPREDNISOLONE 8 MG/1
24 TABLET ORAL ONCE
Qty: 3 TABLET | Refills: 0 | Status: SHIPPED | OUTPATIENT
Start: 2023-09-21 | End: 2023-09-21

## 2023-09-21 RX ORDER — OXYCODONE HYDROCHLORIDE AND ACETAMINOPHEN 5; 325 MG/1; MG/1
1 TABLET ORAL EVERY 6 HOURS PRN
Qty: 28 TABLET | Refills: 0 | Status: SHIPPED | OUTPATIENT
Start: 2023-09-21 | End: 2023-09-28

## 2023-09-21 RX ORDER — METHYLPREDNISOLONE 4 MG/1
4 TABLET ORAL NIGHTLY
Status: DISCONTINUED | OUTPATIENT
Start: 2023-09-23 | End: 2023-09-21 | Stop reason: HOSPADM

## 2023-09-21 RX ADMIN — Medication 100 MG: at 08:30

## 2023-09-21 RX ADMIN — METHOCARBAMOL 1500 MG: 750 TABLET ORAL at 13:10

## 2023-09-21 RX ADMIN — MULTIVITAMIN TABLET 1 TABLET: TABLET at 10:23

## 2023-09-21 RX ADMIN — SODIUM CHLORIDE 1 G: 1 TABLET ORAL at 08:33

## 2023-09-21 RX ADMIN — SODIUM CHLORIDE, PRESERVATIVE FREE 10 ML: 5 INJECTION INTRAVENOUS at 08:31

## 2023-09-21 RX ADMIN — METHOCARBAMOL 1500 MG: 750 TABLET ORAL at 08:30

## 2023-09-21 RX ADMIN — OXYCODONE HYDROCHLORIDE 10 MG: 10 TABLET ORAL at 08:31

## 2023-09-21 RX ADMIN — APIXABAN 5 MG: 5 TABLET, FILM COATED ORAL at 08:30

## 2023-09-21 RX ADMIN — MAGNESIUM SULFATE HEPTAHYDRATE 2000 MG: 40 INJECTION, SOLUTION INTRAVENOUS at 10:27

## 2023-09-21 RX ADMIN — OXYCODONE HYDROCHLORIDE 10 MG: 10 TABLET ORAL at 03:59

## 2023-09-21 RX ADMIN — METHYLPREDNISOLONE 24 MG: 4 TABLET ORAL at 13:08

## 2023-09-21 RX ADMIN — FOLIC ACID 1 MG: 5 INJECTION, SOLUTION INTRAMUSCULAR; INTRAVENOUS; SUBCUTANEOUS at 10:24

## 2023-09-21 RX ADMIN — MULTIVITAMIN TABLET 1 TABLET: TABLET at 08:30

## 2023-09-21 RX ADMIN — SENNOSIDES AND DOCUSATE SODIUM 1 TABLET: 50; 8.6 TABLET ORAL at 08:30

## 2023-09-21 RX ADMIN — METOPROLOL TARTRATE 100 MG: 50 TABLET ORAL at 08:30

## 2023-09-21 RX ADMIN — MAGNESIUM SULFATE HEPTAHYDRATE 2000 MG: 40 INJECTION, SOLUTION INTRAVENOUS at 05:54

## 2023-09-21 RX ADMIN — GABAPENTIN 300 MG: 300 CAPSULE ORAL at 08:30

## 2023-09-21 RX ADMIN — HYDROCORTISONE: 1 CREAM TOPICAL at 10:23

## 2023-09-21 RX ADMIN — FUROSEMIDE 20 MG: 20 TABLET ORAL at 08:31

## 2023-09-21 RX ADMIN — SODIUM CHLORIDE 1 G: 1 TABLET ORAL at 13:11

## 2023-09-21 ASSESSMENT — PAIN DESCRIPTION - DESCRIPTORS
DESCRIPTORS: ACHING;DISCOMFORT
DESCRIPTORS: ACHING;DISCOMFORT
DESCRIPTORS: ACHING;SORE

## 2023-09-21 ASSESSMENT — PAIN SCALES - GENERAL
PAINLEVEL_OUTOF10: 6
PAINLEVEL_OUTOF10: 9
PAINLEVEL_OUTOF10: 8
PAINLEVEL_OUTOF10: 9

## 2023-09-21 ASSESSMENT — PAIN DESCRIPTION - ORIENTATION
ORIENTATION: LOWER;MID
ORIENTATION: MID;LOWER
ORIENTATION: LEFT

## 2023-09-21 ASSESSMENT — PAIN DESCRIPTION - LOCATION
LOCATION: BACK

## 2023-09-21 NOTE — PLAN OF CARE
Problem: Discharge Planning  Goal: Discharge to home or other facility with appropriate resources  9/21/2023 1617 by Aniya Lara RN  Outcome: Completed  9/21/2023 1330 by Aniya Lara RN  Outcome: Adequate for Discharge  Flowsheets (Taken 9/21/2023 6563)  Discharge to home or other facility with appropriate resources: Identify barriers to discharge with patient and caregiver     Problem: ABCDS Injury Assessment  Goal: Absence of physical injury  9/21/2023 1617 by Aniya Lara RN  Outcome: Completed  9/21/2023 1330 by Aniya Lara RN  Outcome: Adequate for Discharge     Problem: Safety - Adult  Goal: Free from fall injury  9/21/2023 1617 by Aniya Lara RN  Outcome: Completed  9/21/2023 1330 by Aniya Lara RN  Outcome: Adequate for Discharge     Problem: Skin/Tissue Integrity  Goal: Absence of new skin breakdown  Description: 1. Monitor for areas of redness and/or skin breakdown  2. Assess vascular access sites hourly  3. Every 4-6 hours minimum:  Change oxygen saturation probe site  4. Every 4-6 hours:  If on nasal continuous positive airway pressure, respiratory therapy assess nares and determine need for appliance change or resting period.   9/21/2023 1617 by Aniya Lara RN  Outcome: Completed  9/21/2023 1330 by Aniya Lara RN  Outcome: Adequate for Discharge     Problem: Pain  Goal: Verbalizes/displays adequate comfort level or baseline comfort level  9/21/2023 1617 by Aniya Lara RN  Outcome: Completed  9/21/2023 1330 by Aniya Lara RN  Outcome: Adequate for Discharge

## 2023-09-21 NOTE — PLAN OF CARE
Problem: Discharge Planning  Goal: Discharge to home or other facility with appropriate resources  Outcome: Adequate for Discharge  Flowsheets (Taken 9/21/2023 3915)  Discharge to home or other facility with appropriate resources: Identify barriers to discharge with patient and caregiver     Problem: ABCDS Injury Assessment  Goal: Absence of physical injury  Outcome: Adequate for Discharge     Problem: Safety - Adult  Goal: Free from fall injury  Outcome: Adequate for Discharge     Problem: Skin/Tissue Integrity  Goal: Absence of new skin breakdown  Description: 1. Monitor for areas of redness and/or skin breakdown  2. Assess vascular access sites hourly  3. Every 4-6 hours minimum:  Change oxygen saturation probe site  4. Every 4-6 hours:  If on nasal continuous positive airway pressure, respiratory therapy assess nares and determine need for appliance change or resting period.   Outcome: Adequate for Discharge     Problem: Pain  Goal: Verbalizes/displays adequate comfort level or baseline comfort level  Outcome: Adequate for Discharge

## 2023-09-21 NOTE — CARE COORDINATION
CM note. Pt remains admitted after a fall at home. Pt developed a rash and is being treated with hydrocortisone cream and PO Benadryl. Per notes may add medrol dose pack. Spoke with Sourav Mckinley from [facility]. Precert was started yesterday and pending. Pasrr completed, toribio and destination reviewed. Ambulette form and envelope is on the soft chart. CM/SW to follow. Gemma Mckinney RN [facility] [phone].      1245:  [facility] can take pt today per Sourav Mckinley. Perfect serve message sent to NP to notify and check for d/c. Dimitris follow. Gemma Mckinney RN [employer] [phone].      4633:  discharge order noted. Facility Fabi[facility] Ingram to  pt today between 8537-5545. RN, pt, and pt sister notified of  time. Nurse will need to call report 21 [phone]. Ambulette form with envelope placed on the soft chart. Gemma Mckinney RN [employer] [phone].
CM note. Pt remains admitted after at fall at home. PRS saw pt today. Pt declined services. Met with pt. He reports he does drink but only 2 days a week and has 3-4 beers. He reports that he does not have a problem. He plans to return to home. Ot eval yesterday [facility] was 17. Pt eval pending today. Pt sister will provide transportation home. CM/SW to follow. Radha Cowan [phone].      1500:  reviewed PT score with pt. He is agreeable to [facility] for short term. He reports he has medicare and medicaid. Left  message with [employer] to check. Pt has [place] insurance listed. Left  message with [address] to admission and to check on his service connection. Pt given list of SNF to look over and provide choices. CM/SW to follow. Radha Cowan [phone].
CM note. Received call from [facility]. Pt is not service connected. Will reach out again to public benefits to see if pt has any other coverage. Spoke with Natalia Lima from public benefits. Pt does not have any other insurance. He attempted to screen pt for medicaid and he did not want to provide the information needed. Met with pt. Discussed that rehab would be self pay. He stated that he can afford it. Pt okay with me reaching out to his sister today. Spoke with pt sister. She reports that the pt drinks daily. She also confirmed that he does have enough money to private pay at [facility]. Pt provided me with an insurance card. I forwarded this to Natalia Lima with [employer] who will ask registration to investigate the care. Pt sister had chosen [facility]. Pt is agreeable to [facility]. Referral called to [facility] for communicare. Will follow. Nas Zamorano [facility] [phone].      9511:  pt accepted to [facility]. They found that the pt has a medicare adv plan. Policy Number is [id number]. They will start insurance precert today. CARLOS and destination updated. Pasrr completed. Ambulette form with envelope placed on the soft chart. Pt updated. CM/SW to follow. Nas Zamorano [facility] [phone]. The Plan for Transition of Care is related to the following treatment goals: PLOF    The Patient and/or patient representative  was provided with a choice of provider and agrees   with the discharge plan. [x] Yes [] No    Freedom of choice list was provided with basic dialogue that supports the patient's individualized plan of care/goals, treatment preferences and shares the quality data associated with the providers.  [x] Yes [] No
Peer Recovery Support Note    Name: Chepe Park  Date: 9/19/2023    Chief Complaint   Patient presents with    Fatemeh Walter twice ( mechanical) + back and rib pain, now unable to walk        Peer Support met with patient. [] Support and education provided  [] Resources provided   [] Treatment referral:   [x] Other:   [] Patient declined peer recovery services     Referred By: Bethel Rick (RN)    Notes: Patient declined help. He told peer he is not a drinker and he is just fine.      Signed: Summer Perez, 9/19/2023
services:  None    ADLS  Prior functional level: Independent in ADLs/IADLs  Current functional level: Assistance with the following:, Mobility    PT AM-PAC: 15 /24  OT AM-PAC: 15 /24    Family can provide assistance at DC: No  Would you like Case Management to discuss the discharge plan with any other family members/significant others, and if so, who? No  Plans to Return to Present Housing: Yes  Other Identified Issues/Barriers to RETURNING to current housing:   Potential Assistance needed at discharge: N/A            Potential DME:    Patient expects to discharge to: [address] [place] [address] for transportation at discharge:      Financial    Payor: Stefanie Hu / Plan: Stefanie Hu / Product Type: *No Product type* /     Does insurance require precert for SNF: Yes    Potential assistance Purchasing Medications:    Meds-to-Beds request:      No Pharmacies Listed    Notes:    Factors facilitating achievement of predicted outcomes: Cooperative    Barriers to discharge: Pain    Additional Case Management Notes: Discharge planning once medically     The Plan for Transition of Care is related to the following treatment goals of Trauma [T14.90XA]  Fall due to stumbling, initial encounter [W01. 0XXA]    IF APPLICABLE: The Patient and/or patient representative Lali Serrano and his family were provided with a choice of provider and agrees with the discharge plan. Freedom of choice list with basic dialogue that supports the patient's individualized plan of care/goals and shares the quality data associated with the providers was provided to:     Patient Representative Name:       The Patient and/or Patient Representative Agree with the Discharge Plan?       Phillip Porras RN  Case Management Department   [phone]

## 2023-09-27 ENCOUNTER — TELEPHONE (OUTPATIENT)
Dept: NON INVASIVE DIAGNOSTICS | Age: 63
End: 2023-09-27

## 2023-09-27 NOTE — TELEPHONE ENCOUNTER
Called patient to remind him of procedure on 9/28, patient states he wants to cancel the appointment.

## 2023-10-06 ENCOUNTER — HOSPITAL ENCOUNTER (INPATIENT)
Age: 63
LOS: 8 days | Discharge: NURSING, REHAB OR LONG-TERM CARE | End: 2023-10-14
Attending: STUDENT IN AN ORGANIZED HEALTH CARE EDUCATION/TRAINING PROGRAM | Admitting: INTERNAL MEDICINE
Payer: OTHER GOVERNMENT

## 2023-10-06 DIAGNOSIS — R26.2 AMBULATORY DYSFUNCTION: Primary | ICD-10-CM

## 2023-10-06 DIAGNOSIS — M54.50 CHRONIC LOW BACK PAIN, UNSPECIFIED BACK PAIN LATERALITY, UNSPECIFIED WHETHER SCIATICA PRESENT: ICD-10-CM

## 2023-10-06 DIAGNOSIS — G89.29 CHRONIC LOW BACK PAIN, UNSPECIFIED BACK PAIN LATERALITY, UNSPECIFIED WHETHER SCIATICA PRESENT: ICD-10-CM

## 2023-10-06 DIAGNOSIS — G06.1 EPIDURAL ABSCESS, L2-L5: ICD-10-CM

## 2023-10-06 DIAGNOSIS — E87.1 HYPONATREMIA: ICD-10-CM

## 2023-10-06 PROBLEM — M54.9 BACK PAIN DUE TO INJURY: Status: ACTIVE | Noted: 2023-10-06

## 2023-10-06 LAB
ALBUMIN SERPL-MCNC: 2.3 G/DL (ref 3.5–5.2)
ALP SERPL-CCNC: 200 U/L (ref 40–129)
ALT SERPL-CCNC: 23 U/L (ref 0–40)
ANION GAP SERPL CALCULATED.3IONS-SCNC: 6 MMOL/L (ref 7–16)
AST SERPL-CCNC: 37 U/L (ref 0–39)
BASOPHILS # BLD: 0.1 K/UL (ref 0–0.2)
BASOPHILS NFR BLD: 1 % (ref 0–2)
BILIRUB SERPL-MCNC: 1.7 MG/DL (ref 0–1.2)
BUN SERPL-MCNC: 7 MG/DL (ref 6–23)
CALCIUM SERPL-MCNC: 7.5 MG/DL (ref 8.6–10.2)
CHLORIDE SERPL-SCNC: 98 MMOL/L (ref 98–107)
CO2 SERPL-SCNC: 26 MMOL/L (ref 22–29)
CREAT SERPL-MCNC: 0.6 MG/DL (ref 0.7–1.2)
EOSINOPHIL # BLD: 0.02 K/UL (ref 0.05–0.5)
EOSINOPHILS RELATIVE PERCENT: 0 % (ref 0–6)
ERYTHROCYTE [DISTWIDTH] IN BLOOD BY AUTOMATED COUNT: 12.9 % (ref 11.5–15)
GFR SERPL CREATININE-BSD FRML MDRD: >60 ML/MIN/1.73M2
GLUCOSE SERPL-MCNC: 103 MG/DL (ref 74–99)
HCT VFR BLD AUTO: 40.1 % (ref 37–54)
HGB BLD-MCNC: 14.1 G/DL (ref 12.5–16.5)
IMM GRANULOCYTES # BLD AUTO: 0.11 K/UL (ref 0–0.58)
IMM GRANULOCYTES NFR BLD: 1 % (ref 0–5)
LYMPHOCYTES NFR BLD: 1.36 K/UL (ref 1.5–4)
LYMPHOCYTES RELATIVE PERCENT: 12 % (ref 20–42)
MCH RBC QN AUTO: 36.7 PG (ref 26–35)
MCHC RBC AUTO-ENTMCNC: 35.2 G/DL (ref 32–34.5)
MCV RBC AUTO: 104.4 FL (ref 80–99.9)
MONOCYTES NFR BLD: 1.41 K/UL (ref 0.1–0.95)
MONOCYTES NFR BLD: 12 % (ref 2–12)
NEUTROPHILS NFR BLD: 75 % (ref 43–80)
NEUTS SEG NFR BLD: 8.81 K/UL (ref 1.8–7.3)
PLATELET # BLD AUTO: 240 K/UL (ref 130–450)
PMV BLD AUTO: 9.5 FL (ref 7–12)
POTASSIUM SERPL-SCNC: 3.9 MMOL/L (ref 3.5–5)
PROT SERPL-MCNC: 6.3 G/DL (ref 6.4–8.3)
RBC # BLD AUTO: 3.84 M/UL (ref 3.8–5.8)
SODIUM SERPL-SCNC: 130 MMOL/L (ref 132–146)
TROPONIN I SERPL HS-MCNC: 15 NG/L (ref 0–11)
WBC OTHER # BLD: 11.8 K/UL (ref 4.5–11.5)

## 2023-10-06 PROCEDURE — 2580000003 HC RX 258: Performed by: INTERNAL MEDICINE

## 2023-10-06 PROCEDURE — 99285 EMERGENCY DEPT VISIT HI MDM: CPT

## 2023-10-06 PROCEDURE — 36415 COLL VENOUS BLD VENIPUNCTURE: CPT

## 2023-10-06 PROCEDURE — 85025 COMPLETE CBC W/AUTO DIFF WBC: CPT

## 2023-10-06 PROCEDURE — 84484 ASSAY OF TROPONIN QUANT: CPT

## 2023-10-06 PROCEDURE — 6370000000 HC RX 637 (ALT 250 FOR IP): Performed by: INTERNAL MEDICINE

## 2023-10-06 PROCEDURE — 2580000003 HC RX 258: Performed by: STUDENT IN AN ORGANIZED HEALTH CARE EDUCATION/TRAINING PROGRAM

## 2023-10-06 PROCEDURE — 80053 COMPREHEN METABOLIC PANEL: CPT

## 2023-10-06 PROCEDURE — 1200000000 HC SEMI PRIVATE

## 2023-10-06 PROCEDURE — 6370000000 HC RX 637 (ALT 250 FOR IP): Performed by: FAMILY MEDICINE

## 2023-10-06 PROCEDURE — 96360 HYDRATION IV INFUSION INIT: CPT

## 2023-10-06 PROCEDURE — 6370000000 HC RX 637 (ALT 250 FOR IP): Performed by: STUDENT IN AN ORGANIZED HEALTH CARE EDUCATION/TRAINING PROGRAM

## 2023-10-06 RX ORDER — ACETAMINOPHEN 650 MG/1
650 SUPPOSITORY RECTAL EVERY 6 HOURS PRN
Status: DISCONTINUED | OUTPATIENT
Start: 2023-10-06 | End: 2023-10-14 | Stop reason: HOSPADM

## 2023-10-06 RX ORDER — ACETAMINOPHEN 325 MG/1
650 TABLET ORAL EVERY 6 HOURS PRN
Status: DISCONTINUED | OUTPATIENT
Start: 2023-10-06 | End: 2023-10-14 | Stop reason: HOSPADM

## 2023-10-06 RX ORDER — SODIUM CHLORIDE 0.9 % (FLUSH) 0.9 %
5-40 SYRINGE (ML) INJECTION PRN
Status: DISCONTINUED | OUTPATIENT
Start: 2023-10-06 | End: 2023-10-14 | Stop reason: HOSPADM

## 2023-10-06 RX ORDER — SENNOSIDES A AND B 8.6 MG/1
1 TABLET, FILM COATED ORAL DAILY PRN
Status: DISCONTINUED | OUTPATIENT
Start: 2023-10-06 | End: 2023-10-14 | Stop reason: HOSPADM

## 2023-10-06 RX ORDER — 0.9 % SODIUM CHLORIDE 0.9 %
1000 INTRAVENOUS SOLUTION INTRAVENOUS ONCE
Status: COMPLETED | OUTPATIENT
Start: 2023-10-06 | End: 2023-10-06

## 2023-10-06 RX ORDER — METOPROLOL TARTRATE 50 MG/1
50 TABLET, FILM COATED ORAL 2 TIMES DAILY
Status: DISCONTINUED | OUTPATIENT
Start: 2023-10-06 | End: 2023-10-14 | Stop reason: HOSPADM

## 2023-10-06 RX ORDER — SODIUM CHLORIDE 9 MG/ML
INJECTION, SOLUTION INTRAVENOUS PRN
Status: DISCONTINUED | OUTPATIENT
Start: 2023-10-06 | End: 2023-10-14 | Stop reason: HOSPADM

## 2023-10-06 RX ORDER — ONDANSETRON 2 MG/ML
4 INJECTION INTRAMUSCULAR; INTRAVENOUS EVERY 6 HOURS PRN
Status: DISCONTINUED | OUTPATIENT
Start: 2023-10-06 | End: 2023-10-14 | Stop reason: HOSPADM

## 2023-10-06 RX ORDER — SODIUM CHLORIDE 0.9 % (FLUSH) 0.9 %
5-40 SYRINGE (ML) INJECTION EVERY 12 HOURS SCHEDULED
Status: DISCONTINUED | OUTPATIENT
Start: 2023-10-06 | End: 2023-10-14 | Stop reason: HOSPADM

## 2023-10-06 RX ORDER — FOLIC ACID 1 MG/1
500 TABLET ORAL DAILY
Status: DISCONTINUED | OUTPATIENT
Start: 2023-10-06 | End: 2023-10-14 | Stop reason: HOSPADM

## 2023-10-06 RX ORDER — HYDROCODONE BITARTRATE AND ACETAMINOPHEN 5; 325 MG/1; MG/1
1 TABLET ORAL ONCE
Status: COMPLETED | OUTPATIENT
Start: 2023-10-06 | End: 2023-10-06

## 2023-10-06 RX ORDER — GABAPENTIN 300 MG/1
300 CAPSULE ORAL 2 TIMES DAILY
Status: DISCONTINUED | OUTPATIENT
Start: 2023-10-06 | End: 2023-10-14 | Stop reason: HOSPADM

## 2023-10-06 RX ORDER — OXYCODONE HYDROCHLORIDE AND ACETAMINOPHEN 5; 325 MG/1; MG/1
1 TABLET ORAL EVERY 4 HOURS PRN
Status: DISCONTINUED | OUTPATIENT
Start: 2023-10-06 | End: 2023-10-10

## 2023-10-06 RX ORDER — OXYCODONE HYDROCHLORIDE AND ACETAMINOPHEN 5; 325 MG/1; MG/1
1 TABLET ORAL EVERY 4 HOURS PRN
Status: ON HOLD | COMMUNITY
End: 2023-10-13 | Stop reason: HOSPADM

## 2023-10-06 RX ORDER — ONDANSETRON 4 MG/1
4 TABLET, ORALLY DISINTEGRATING ORAL EVERY 8 HOURS PRN
Status: DISCONTINUED | OUTPATIENT
Start: 2023-10-06 | End: 2023-10-14 | Stop reason: HOSPADM

## 2023-10-06 RX ORDER — MAGNESIUM HYDROXIDE/ALUMINUM HYDROXICE/SIMETHICONE 120; 1200; 1200 MG/30ML; MG/30ML; MG/30ML
30 SUSPENSION ORAL EVERY 6 HOURS PRN
Status: DISCONTINUED | OUTPATIENT
Start: 2023-10-06 | End: 2023-10-14 | Stop reason: HOSPADM

## 2023-10-06 RX ADMIN — HYDROCODONE BITARTRATE AND ACETAMINOPHEN 1 TABLET: 5; 325 TABLET ORAL at 14:44

## 2023-10-06 RX ADMIN — OXYCODONE AND ACETAMINOPHEN 1 TABLET: 5; 325 TABLET ORAL at 21:52

## 2023-10-06 RX ADMIN — METOPROLOL TARTRATE 50 MG: 50 TABLET ORAL at 21:52

## 2023-10-06 RX ADMIN — Medication 10 ML: at 21:59

## 2023-10-06 RX ADMIN — APIXABAN 5 MG: 5 TABLET, FILM COATED ORAL at 21:52

## 2023-10-06 RX ADMIN — SODIUM CHLORIDE 1000 ML: 9 INJECTION, SOLUTION INTRAVENOUS at 16:05

## 2023-10-06 ASSESSMENT — ENCOUNTER SYMPTOMS
DIARRHEA: 0
SHORTNESS OF BREATH: 0
CHEST TIGHTNESS: 0
PHOTOPHOBIA: 0
ABDOMINAL DISTENTION: 0
BACK PAIN: 1
ABDOMINAL PAIN: 0
VOMITING: 0
NAUSEA: 0
COUGH: 0

## 2023-10-06 ASSESSMENT — PAIN - FUNCTIONAL ASSESSMENT: PAIN_FUNCTIONAL_ASSESSMENT: NONE - DENIES PAIN

## 2023-10-06 ASSESSMENT — PAIN SCALES - GENERAL: PAINLEVEL_OUTOF10: 10

## 2023-10-06 ASSESSMENT — PAIN DESCRIPTION - LOCATION: LOCATION: BACK

## 2023-10-06 NOTE — H&P
OF THE CHEST; CT OF THE ABDOMEN AND PELVIS WITH CONTRAST 9/15/2023 10:39 am TECHNIQUE: CTA of the chest was performed after the administration of intravenous contrast.  Multiplanar reformatted images are provided for review. MIP images are provided for review. Automated exposure control, iterative reconstruction, and/or weight based adjustment of the mA/kV was utilized to reduce the radiation dose to as low as reasonably achievable.; CT of the abdomen and pelvis was performed with the administration of intravenous contrast. Multiplanar reformatted images are provided for review. Automated exposure control, iterative reconstruction, and/or weight based adjustment of the mA/kV was utilized to reduce the radiation dose to as low as reasonably achievable. COMPARISON: None. HISTORY: ORDERING SYSTEM PROVIDED HISTORY: Tachycardia + SOB + fall + ecchymosis; concern for PE vs rib fx TECHNOLOGIST PROVIDED HISTORY: Reason for exam:->Tachycardia + SOB + fall + ecchymosis; concern for PE vs rib fx Decision Support Exception - unselect if not a suspected or confirmed emergency medical condition->Emergency Medical Condition (MA) What reading provider will be dictating this exam?->CRC; ORDERING SYSTEM PROVIDED HISTORY: Falls + left flank ecchymosis; concern for splenic injury TECHNOLOGIST PROVIDED HISTORY: Reason for exam:->Falls + left flank ecchymosis; concern for splenic injury Additional Contrast?->None Decision Support Exception - unselect if not a suspected or confirmed emergency medical condition->Emergency Medical Condition (MA) What reading provider will be dictating this exam?->CRC FINDINGS: Pulmonary Arteries: Pulmonary arteries are adequately opacified for evaluation. No evidence of intraluminal filling defect to suggest pulmonary embolism. Main pulmonary artery is normal in caliber. Mediastinum: No evidence of mediastinal lymphadenopathy. The heart and pericardium demonstrate no acute abnormality.   There is no bilaterally at the C5-6 level. The prevertebral soft tissues are unremarkable. The airway is widely patent. Images through the lung apices are negative for a pneumothorax. 1. There is no acute compression fracture or subluxation of the cervical spine. 2. Multilevel degenerative disc and degenerative joint disease. 3. Large posterior degenerative endplate spurs at the Q6-2 level causing spinal canal stenosis and bilateral neural foraminal narrowing. CT Head W/O Contrast    Result Date: 9/15/2023  EXAMINATION: CT OF THE HEAD WITHOUT CONTRAST  9/15/2023 10:39 am TECHNIQUE: CT of the head was performed without the administration of intravenous contrast. Automated exposure control, iterative reconstruction, and/or weight based adjustment of the mA/kV was utilized to reduce the radiation dose to as low as reasonably achievable. COMPARISON: None. HISTORY: ORDERING SYSTEM PROVIDED HISTORY: Falls; concern for ICH TECHNOLOGIST PROVIDED HISTORY: Reason for exam:->Falls; concern for ICH Has a \"code stroke\" or \"stroke alert\" been called? ->No Decision Support Exception - unselect if not a suspected or confirmed emergency medical condition->Emergency Medical Condition (MA) What reading provider will be dictating this exam?->CRC FINDINGS: BRAIN/VENTRICLES: There is no acute intracranial hemorrhage, mass effect or midline shift. No abnormal extra-axial fluid collection. The gray-white differentiation is maintained without evidence of an acute infarct. There is no evidence of hydrocephalus. ORBITS: The visualized portion of the orbits demonstrate no acute abnormality. SINUSES: The visualized paranasal sinuses and mastoid air cells demonstrate no acute abnormality. SOFT TISSUES/SKULL:  No acute abnormality of the visualized skull or soft tissues. No acute intracranial abnormality.   Specifically, there is no acute intracranial hemorrhage     XR CHEST PORTABLE    Result Date: 9/15/2023  EXAMINATION: ONE XRAY VIEW OF THE

## 2023-10-06 NOTE — ED NOTES
Pt incontinent to urine, pt full linen change and cleaned up     Alida Lyn, [address]  10/06/23 0218 Pfizer dose 1, 2, and 3

## 2023-10-06 NOTE — ED NOTES
Nurse to nurse report called to [facility] on [address] [facility], [address] [address], [place]  10/06/23 0022

## 2023-10-07 ENCOUNTER — APPOINTMENT (OUTPATIENT)
Dept: GENERAL RADIOLOGY | Age: 63
End: 2023-10-07
Payer: OTHER GOVERNMENT

## 2023-10-07 LAB
ALBUMIN SERPL-MCNC: 1.8 G/DL (ref 3.5–5.2)
ALP SERPL-CCNC: 188 U/L (ref 40–129)
ALT SERPL-CCNC: 19 U/L (ref 0–40)
ANION GAP SERPL CALCULATED.3IONS-SCNC: 4 MMOL/L (ref 7–16)
AST SERPL-CCNC: 31 U/L (ref 0–39)
BASOPHILS # BLD: 0.14 K/UL (ref 0–0.2)
BASOPHILS NFR BLD: 2 % (ref 0–2)
BILIRUB SERPL-MCNC: 1.8 MG/DL (ref 0–1.2)
BUN SERPL-MCNC: 7 MG/DL (ref 6–23)
CALCIUM SERPL-MCNC: 7.7 MG/DL (ref 8.6–10.2)
CHLORIDE SERPL-SCNC: 103 MMOL/L (ref 98–107)
CO2 SERPL-SCNC: 24 MMOL/L (ref 22–29)
CREAT SERPL-MCNC: 0.6 MG/DL (ref 0.7–1.2)
EOSINOPHIL # BLD: 0.17 K/UL (ref 0.05–0.5)
EOSINOPHILS RELATIVE PERCENT: 2 % (ref 0–6)
ERYTHROCYTE [DISTWIDTH] IN BLOOD BY AUTOMATED COUNT: 12.7 % (ref 11.5–15)
GFR SERPL CREATININE-BSD FRML MDRD: >60 ML/MIN/1.73M2
GLUCOSE SERPL-MCNC: 96 MG/DL (ref 74–99)
HCT VFR BLD AUTO: 37.3 % (ref 37–54)
HGB BLD-MCNC: 12.7 G/DL (ref 12.5–16.5)
IMM GRANULOCYTES # BLD AUTO: 0.04 K/UL (ref 0–0.58)
IMM GRANULOCYTES NFR BLD: 0 % (ref 0–5)
LYMPHOCYTES NFR BLD: 1.58 K/UL (ref 1.5–4)
LYMPHOCYTES RELATIVE PERCENT: 17 % (ref 20–42)
MCH RBC QN AUTO: 35.5 PG (ref 26–35)
MCHC RBC AUTO-ENTMCNC: 34 G/DL (ref 32–34.5)
MCV RBC AUTO: 104.2 FL (ref 80–99.9)
MONOCYTES NFR BLD: 1.2 K/UL (ref 0.1–0.95)
MONOCYTES NFR BLD: 13 % (ref 2–12)
NEUTROPHILS NFR BLD: 67 % (ref 43–80)
NEUTS SEG NFR BLD: 6.34 K/UL (ref 1.8–7.3)
PLATELET # BLD AUTO: 214 K/UL (ref 130–450)
PMV BLD AUTO: 8.9 FL (ref 7–12)
POTASSIUM SERPL-SCNC: 4 MMOL/L (ref 3.5–5)
PROT SERPL-MCNC: 5.7 G/DL (ref 6.4–8.3)
RBC # BLD AUTO: 3.58 M/UL (ref 3.8–5.8)
SODIUM SERPL-SCNC: 131 MMOL/L (ref 132–146)
TROPONIN I SERPL HS-MCNC: 16 NG/L (ref 0–11)
WBC OTHER # BLD: 9.5 K/UL (ref 4.5–11.5)

## 2023-10-07 PROCEDURE — 80053 COMPREHEN METABOLIC PANEL: CPT

## 2023-10-07 PROCEDURE — 6370000000 HC RX 637 (ALT 250 FOR IP): Performed by: INTERNAL MEDICINE

## 2023-10-07 PROCEDURE — 36415 COLL VENOUS BLD VENIPUNCTURE: CPT

## 2023-10-07 PROCEDURE — 2580000003 HC RX 258: Performed by: INTERNAL MEDICINE

## 2023-10-07 PROCEDURE — 97161 PT EVAL LOW COMPLEX 20 MIN: CPT

## 2023-10-07 PROCEDURE — 6370000000 HC RX 637 (ALT 250 FOR IP): Performed by: FAMILY MEDICINE

## 2023-10-07 PROCEDURE — 72120 X-RAY BEND ONLY L-S SPINE: CPT

## 2023-10-07 PROCEDURE — 1200000000 HC SEMI PRIVATE

## 2023-10-07 PROCEDURE — 97165 OT EVAL LOW COMPLEX 30 MIN: CPT

## 2023-10-07 PROCEDURE — 84484 ASSAY OF TROPONIN QUANT: CPT

## 2023-10-07 PROCEDURE — 97535 SELF CARE MNGMENT TRAINING: CPT

## 2023-10-07 PROCEDURE — 97530 THERAPEUTIC ACTIVITIES: CPT

## 2023-10-07 PROCEDURE — 85025 COMPLETE CBC W/AUTO DIFF WBC: CPT

## 2023-10-07 RX ORDER — POTASSIUM CHLORIDE 750 MG/1
10 TABLET, EXTENDED RELEASE ORAL DAILY
Status: DISCONTINUED | OUTPATIENT
Start: 2023-10-07 | End: 2023-10-14 | Stop reason: HOSPADM

## 2023-10-07 RX ORDER — FUROSEMIDE 20 MG/1
20 TABLET ORAL DAILY
Status: DISCONTINUED | OUTPATIENT
Start: 2023-10-07 | End: 2023-10-14 | Stop reason: HOSPADM

## 2023-10-07 RX ORDER — DIAZEPAM 5 MG/1
10 TABLET ORAL
Status: COMPLETED | OUTPATIENT
Start: 2023-10-07 | End: 2023-10-08

## 2023-10-07 RX ADMIN — POTASSIUM CHLORIDE 10 MEQ: 750 TABLET, EXTENDED RELEASE ORAL at 11:36

## 2023-10-07 RX ADMIN — OXYCODONE AND ACETAMINOPHEN 1 TABLET: 5; 325 TABLET ORAL at 02:21

## 2023-10-07 RX ADMIN — ACETAMINOPHEN 650 MG: 325 TABLET ORAL at 05:24

## 2023-10-07 RX ADMIN — FUROSEMIDE 20 MG: 20 TABLET ORAL at 10:24

## 2023-10-07 RX ADMIN — OXYCODONE AND ACETAMINOPHEN 1 TABLET: 5; 325 TABLET ORAL at 15:21

## 2023-10-07 RX ADMIN — OXYCODONE AND ACETAMINOPHEN 1 TABLET: 5; 325 TABLET ORAL at 19:57

## 2023-10-07 RX ADMIN — GABAPENTIN 300 MG: 300 CAPSULE ORAL at 19:57

## 2023-10-07 RX ADMIN — APIXABAN 5 MG: 5 TABLET, FILM COATED ORAL at 19:57

## 2023-10-07 RX ADMIN — APIXABAN 5 MG: 5 TABLET, FILM COATED ORAL at 10:19

## 2023-10-07 RX ADMIN — FOLIC ACID 500 MCG: 1 TABLET ORAL at 10:19

## 2023-10-07 RX ADMIN — METOPROLOL TARTRATE 50 MG: 50 TABLET ORAL at 19:57

## 2023-10-07 RX ADMIN — GABAPENTIN 300 MG: 300 CAPSULE ORAL at 10:19

## 2023-10-07 RX ADMIN — METOPROLOL TARTRATE 50 MG: 50 TABLET ORAL at 10:19

## 2023-10-07 ASSESSMENT — PAIN SCALES - GENERAL
PAINLEVEL_OUTOF10: 8
PAINLEVEL_OUTOF10: 4
PAINLEVEL_OUTOF10: 6
PAINLEVEL_OUTOF10: 8

## 2023-10-07 ASSESSMENT — ENCOUNTER SYMPTOMS
ABDOMINAL PAIN: 0
TROUBLE SWALLOWING: 0
SHORTNESS OF BREATH: 0
PHOTOPHOBIA: 0
BACK PAIN: 1

## 2023-10-07 ASSESSMENT — PAIN DESCRIPTION - LOCATION
LOCATION: BACK
LOCATION: BACK
LOCATION: BACK;LEG

## 2023-10-07 ASSESSMENT — PAIN DESCRIPTION - ORIENTATION: ORIENTATION: RIGHT;LEFT;LOWER;POSTERIOR

## 2023-10-07 ASSESSMENT — PAIN DESCRIPTION - ONSET: ONSET: ON-GOING

## 2023-10-07 ASSESSMENT — PAIN DESCRIPTION - DESCRIPTORS
DESCRIPTORS: ACHING;SORE
DESCRIPTORS: SHARP;STABBING;ACHING

## 2023-10-07 ASSESSMENT — PAIN - FUNCTIONAL ASSESSMENT: PAIN_FUNCTIONAL_ASSESSMENT: PREVENTS OR INTERFERES SOME ACTIVE ACTIVITIES AND ADLS

## 2023-10-07 ASSESSMENT — PAIN DESCRIPTION - FREQUENCY: FREQUENCY: CONTINUOUS

## 2023-10-07 ASSESSMENT — PAIN DESCRIPTION - PAIN TYPE: TYPE: ACUTE PAIN;CHRONIC PAIN

## 2023-10-07 NOTE — PROGRESS NOTES
Discussed Neurosurgery Consult ordered by Reno Salvador, for recent trauma, chronic L1-2 fx, returning with inability to ambulate,consult to assess for the need repeat imaging/intervention, with SHONA Isabel.

## 2023-10-07 NOTE — PROGRESS NOTES
Physical Therapy  Physical Therapy Initial Assessment     Name: Mazin Rees  : 1960  MRN: [id number]      Date of Service: 10/7/2023    Evaluating PT:  Adrianne Dow PT, DPT, [id number]    Room #:  3697/9804-I  Diagnosis:  Hyponatremia [E87.1]  Ambulatory dysfunction [R26.2]  Back pain due to injury [M54.9]  Chronic low back pain, unspecified back pain laterality, unspecified whether sciatica present [M54.50, G89.29]  PMHx/PSHx:    Past Medical History:   Diagnosis Date    Atrial fibrillation with RVR ([address])       Past Surgical History:   Procedure Laterality Date    HIP FRACTURE SURGERY Right     Rt Hip. Operated on 4 times. KNEE SURGERY Left     LEG SURGERY Right       Procedure/Surgery:  none this admission  Precautions:  fall risk, spinal, cognition, poor safety awareness  Equipment Needs:  TBD    SUBJECTIVE:    Pt lives alone in a 1 story home with 3 stairs to enter and 2 rail. Bed is on 1 floor and bath is on 1 floor. Pt ambulated with no AD PTA. OBJECTIVE:   Initial Evaluation  Date: 10/7/23 Treatment Short Term/ Long Term   Goals   AM-PAC 6 Clicks 28/15     Was pt agreeable to Eval/treatment? yes     Does pt have pain? 10/10 back pain     Bed Mobility  Rolling: NT  Supine to sit: NT  Sit to supine: NT  Scooting: NT  Rolling: Independent   Supine to sit: Independent   Sit to supine: Independent   Scooting: Independent    Transfers Sit to stand: ModA  Stand to sit: Orly  Stand pivot: Orly with [employer]  Sit to stand: Independent   Stand to sit:  Independent   Stand pivot: mod Independent with AD   Ambulation    100x2  with [employer] Orly  >300 feet with AD mod Independent    Stair negotiation: ascended and descended  NT  4+ steps with 1 rail mod Independent    ROM BUE:  Refer to OT note  BLE:  decreased hip flexion B d/t weakness     Strength BUE:  Refer to OT note  RLE:  3+/5 except hip flexion 2+/5  LLE: 4/5 except hip flexion 3/5  Improve by 1/3 MMT   Balance Sitting EOB:  NT  Dynamic Standing:  Orly interaction with environment. Skilled monitoring of vitals throughout session. SPO2 94% RA, 's     Pt's/ family goals   1. To get better    Prognosis is fair for reaching above PT goals. Patient and or family understand(s) diagnosis, prognosis, and plan of care. yes    PHYSICAL THERAPY PLAN OF CARE:    PT POC is established based on physician order and patient diagnosis     Referring provider/PT Order:    10/06/23 2045  PT eval and treat  Start:  10/06/23 2045,   End:  10/06/23 2045,   ONE TIME,   Standing Count:  1 Occurrences,   R         Donnell Velasquez MD     Diagnosis:  Hyponatremia [E87.1]  Ambulatory dysfunction [R26.2]  Back pain due to injury [M54.9]  Chronic low back pain, unspecified back pain laterality, unspecified whether sciatica present [M54.50, G89.29]  Specific instructions for next treatment:  Maximize safe and independent functional mobility     Current Treatment Recommendations:     [x] Strengthening to improve independence with functional mobility   [x] ROM to improve independence with functional mobility   [x] Balance Training to improve static/dynamic balance and to reduce fall risk  [x] Endurance Training to improve activity tolerance during functional mobility   [x] Transfer Training to improve safety and independence with all functional transfers   [x] Gait Training to improve gait mechanics, endurance and assess need for appropriate assistive device  [x] Stair Training in preparation for safe discharge home and/or into the community   [x] Positioning to prevent skin breakdown and contractures  [x] Safety and Education Training   [x] Patient/Caregiver Education   [] HEP  [x] Other     PT long term treatment goals are located in above grid    Frequency of treatments: 2-5x/week x 1-2 weeks.     Time in  0839  Time out  0902    Total Treatment Time  8 minutes     Evaluation Time includes thorough review of current medical information, gathering information on past medical

## 2023-10-07 NOTE — PROGRESS NOTES
Occupational Therapy  OCCUPATIONAL THERAPY INITIAL EVALUATION    KHURRAM Soliz [address] [facility]   [address] [place], [place]      [id number]                                                Patient Name: Karine Davis  MRN: [id number]  : 1960  Room: [address] [place] [address]    Evaluating OT: Mitzy Kennedy OTR/L #[id number]     Referring Provider: Ave Croft MD  Specific Provider Orders/Date: OT eval and treat 10/6/23    Diagnosis: Hyponatremia [E87.1]  Ambulatory dysfunction [R26.2]  Back pain due to injury [M54.9]  Chronic low back pain, unspecified back pain laterality, unspecified whether sciatica present [M54.50, G89.29]   Pt admitted to hospital with back pain and inability to care for self at home following recent d/c from rehab     Pertinent Medical History:  has a past medical history of Atrial fibrillation with RVR ([address]        Precautions:  Fall Risk, spine neutrality (Hx L2-3 TP fxs), impulsivity     Assessment of current deficits    [x] Functional mobility  [x]ADLs  [x] Strength               [x]Cognition    [x] Functional transfers   [x] IADLs         [x] Safety Awareness   [x]Endurance    [] Fine Coordination              [x] Balance      [] Vision/perception   []Sensation     []Gross Motor Coordination  [] ROM  [] Delirium                   [] Motor Control     OT PLAN OF CARE   OT POC based on physician orders, patient diagnosis and results of clinical assessment    Frequency/Duration 1-3 days/wk for 2 weeks PRN   Specific OT Treatment Interventions to include:   * Instruction/training on adapted ADL techniques and AE recommendations to increase functional independence within precautions       * Training on energy conservation strategies, correct breathing pattern and techniques to improve independence/tolerance for self-care routine  * Functional transfer/mobility training/DME recommendations for increased independence, safety, and fall prevention  *

## 2023-10-08 ENCOUNTER — APPOINTMENT (OUTPATIENT)
Dept: MRI IMAGING | Age: 63
End: 2023-10-08
Payer: OTHER GOVERNMENT

## 2023-10-08 PROCEDURE — 6360000004 HC RX CONTRAST MEDICATION: Performed by: RADIOLOGY

## 2023-10-08 PROCEDURE — 6370000000 HC RX 637 (ALT 250 FOR IP): Performed by: PHYSICIAN ASSISTANT

## 2023-10-08 PROCEDURE — 6370000000 HC RX 637 (ALT 250 FOR IP): Performed by: FAMILY MEDICINE

## 2023-10-08 PROCEDURE — 6370000000 HC RX 637 (ALT 250 FOR IP): Performed by: INTERNAL MEDICINE

## 2023-10-08 PROCEDURE — 1200000000 HC SEMI PRIVATE

## 2023-10-08 PROCEDURE — A9579 GAD-BASE MR CONTRAST NOS,1ML: HCPCS | Performed by: RADIOLOGY

## 2023-10-08 PROCEDURE — 72158 MRI LUMBAR SPINE W/O & W/DYE: CPT

## 2023-10-08 RX ADMIN — APIXABAN 5 MG: 5 TABLET, FILM COATED ORAL at 21:40

## 2023-10-08 RX ADMIN — OXYCODONE AND ACETAMINOPHEN 1 TABLET: 5; 325 TABLET ORAL at 01:34

## 2023-10-08 RX ADMIN — GABAPENTIN 300 MG: 300 CAPSULE ORAL at 09:22

## 2023-10-08 RX ADMIN — GABAPENTIN 300 MG: 300 CAPSULE ORAL at 21:40

## 2023-10-08 RX ADMIN — APIXABAN 5 MG: 5 TABLET, FILM COATED ORAL at 09:22

## 2023-10-08 RX ADMIN — FUROSEMIDE 20 MG: 20 TABLET ORAL at 09:22

## 2023-10-08 RX ADMIN — METOPROLOL TARTRATE 50 MG: 50 TABLET ORAL at 09:22

## 2023-10-08 RX ADMIN — OXYCODONE AND ACETAMINOPHEN 1 TABLET: 5; 325 TABLET ORAL at 19:48

## 2023-10-08 RX ADMIN — OXYCODONE AND ACETAMINOPHEN 1 TABLET: 5; 325 TABLET ORAL at 23:40

## 2023-10-08 RX ADMIN — METOPROLOL TARTRATE 50 MG: 50 TABLET ORAL at 21:40

## 2023-10-08 RX ADMIN — OXYCODONE AND ACETAMINOPHEN 1 TABLET: 5; 325 TABLET ORAL at 10:48

## 2023-10-08 RX ADMIN — GADOTERIDOL 20 ML: 279.3 INJECTION, SOLUTION INTRAVENOUS at 12:03

## 2023-10-08 RX ADMIN — FOLIC ACID 500 MCG: 1 TABLET ORAL at 09:22

## 2023-10-08 RX ADMIN — DIAZEPAM 10 MG: 5 TABLET ORAL at 11:10

## 2023-10-08 RX ADMIN — POTASSIUM CHLORIDE 10 MEQ: 750 TABLET, EXTENDED RELEASE ORAL at 09:22

## 2023-10-08 ASSESSMENT — PAIN - FUNCTIONAL ASSESSMENT
PAIN_FUNCTIONAL_ASSESSMENT: PREVENTS OR INTERFERES SOME ACTIVE ACTIVITIES AND ADLS

## 2023-10-08 ASSESSMENT — PAIN SCALES - GENERAL
PAINLEVEL_OUTOF10: 6
PAINLEVEL_OUTOF10: 6
PAINLEVEL_OUTOF10: 10
PAINLEVEL_OUTOF10: 10
PAINLEVEL_OUTOF10: 7
PAINLEVEL_OUTOF10: 6

## 2023-10-08 ASSESSMENT — PAIN DESCRIPTION - DESCRIPTORS
DESCRIPTORS: ACHING;DISCOMFORT;DULL
DESCRIPTORS: SHARP;STABBING;DISCOMFORT
DESCRIPTORS: BURNING;SHARP;STABBING
DESCRIPTORS: STABBING;SHARP

## 2023-10-08 ASSESSMENT — PAIN DESCRIPTION - FREQUENCY
FREQUENCY: CONTINUOUS

## 2023-10-08 ASSESSMENT — PAIN DESCRIPTION - PAIN TYPE
TYPE: CHRONIC PAIN;ACUTE PAIN
TYPE: ACUTE PAIN;CHRONIC PAIN
TYPE: CHRONIC PAIN;ACUTE PAIN
TYPE: ACUTE PAIN;CHRONIC PAIN

## 2023-10-08 ASSESSMENT — PAIN DESCRIPTION - ORIENTATION
ORIENTATION: RIGHT;LOWER
ORIENTATION: MID;LOWER
ORIENTATION: LOWER;POSTERIOR;RIGHT;LEFT

## 2023-10-08 ASSESSMENT — PAIN DESCRIPTION - ONSET
ONSET: ON-GOING

## 2023-10-08 ASSESSMENT — PAIN DESCRIPTION - DIRECTION
RADIATING_TOWARDS: BLE
RADIATING_TOWARDS: BLE

## 2023-10-08 ASSESSMENT — PAIN DESCRIPTION - LOCATION
LOCATION: BACK;LEG
LOCATION: BACK;LEG
LOCATION: BACK
LOCATION: BACK;KNEE

## 2023-10-08 NOTE — PROGRESS NOTES
Cigarettes and lighter found in patient's bed during linen change. Items removed from room, placed in bag and labeled with omnicell label.

## 2023-10-08 NOTE — PROGRESS NOTES
Placed order for VSC. He tried to get out of bed last night, very impulsive.   Called #[phone] and we are #3 on the list.

## 2023-10-08 NOTE — PROGRESS NOTES
Pt has 99.1 Oral temperature, he refused Tylenol as it will \"destroy his liver\" However, Percocet  was just given.

## 2023-10-09 PROBLEM — G06.1 ABSCESS IN EPIDURAL SPACE OF LUMBAR SPINE: Status: ACTIVE | Noted: 2023-10-09

## 2023-10-09 LAB
ANION GAP SERPL CALCULATED.3IONS-SCNC: 6 MMOL/L (ref 7–16)
BUN SERPL-MCNC: 6 MG/DL (ref 6–23)
CALCIUM SERPL-MCNC: 8 MG/DL (ref 8.6–10.2)
CHLORIDE SERPL-SCNC: 100 MMOL/L (ref 98–107)
CO2 SERPL-SCNC: 27 MMOL/L (ref 22–29)
CREAT SERPL-MCNC: 0.6 MG/DL (ref 0.7–1.2)
CRP SERPL HS-MCNC: 14 MG/L (ref 0–5)
ERYTHROCYTE [DISTWIDTH] IN BLOOD BY AUTOMATED COUNT: 12.8 % (ref 11.5–15)
ERYTHROCYTE [SEDIMENTATION RATE] IN BLOOD BY WESTERGREN METHOD: 26 MM/HR (ref 0–15)
GFR SERPL CREATININE-BSD FRML MDRD: >60 ML/MIN/1.73M2
GLUCOSE SERPL-MCNC: 107 MG/DL (ref 74–99)
HCT VFR BLD AUTO: 41.3 % (ref 37–54)
HGB BLD-MCNC: 14 G/DL (ref 12.5–16.5)
MCH RBC QN AUTO: 35.7 PG (ref 26–35)
MCHC RBC AUTO-ENTMCNC: 33.9 G/DL (ref 32–34.5)
MCV RBC AUTO: 105.4 FL (ref 80–99.9)
PLATELET # BLD AUTO: 230 K/UL (ref 130–450)
PMV BLD AUTO: 9.4 FL (ref 7–12)
POTASSIUM SERPL-SCNC: 4 MMOL/L (ref 3.5–5)
RBC # BLD AUTO: 3.92 M/UL (ref 3.8–5.8)
SODIUM SERPL-SCNC: 133 MMOL/L (ref 132–146)
WBC OTHER # BLD: 9.8 K/UL (ref 4.5–11.5)

## 2023-10-09 PROCEDURE — 36415 COLL VENOUS BLD VENIPUNCTURE: CPT

## 2023-10-09 PROCEDURE — 80048 BASIC METABOLIC PNL TOTAL CA: CPT

## 2023-10-09 PROCEDURE — 2580000003 HC RX 258: Performed by: INTERNAL MEDICINE

## 2023-10-09 PROCEDURE — 87077 CULTURE AEROBIC IDENTIFY: CPT

## 2023-10-09 PROCEDURE — L0464 TLSO 4MOD SACRO-SCAP PRE: HCPCS | Performed by: PHYSICIAN ASSISTANT

## 2023-10-09 PROCEDURE — 6360000002 HC RX W HCPCS: Performed by: NEUROLOGICAL SURGERY

## 2023-10-09 PROCEDURE — 6370000000 HC RX 637 (ALT 250 FOR IP): Performed by: FAMILY MEDICINE

## 2023-10-09 PROCEDURE — 87040 BLOOD CULTURE FOR BACTERIA: CPT

## 2023-10-09 PROCEDURE — 87086 URINE CULTURE/COLONY COUNT: CPT

## 2023-10-09 PROCEDURE — 85652 RBC SED RATE AUTOMATED: CPT

## 2023-10-09 PROCEDURE — 6370000000 HC RX 637 (ALT 250 FOR IP): Performed by: INTERNAL MEDICINE

## 2023-10-09 PROCEDURE — 87154 CUL TYP ID BLD PTHGN 6+ TRGT: CPT

## 2023-10-09 PROCEDURE — 85027 COMPLETE CBC AUTOMATED: CPT

## 2023-10-09 PROCEDURE — 1200000000 HC SEMI PRIVATE

## 2023-10-09 PROCEDURE — 86140 C-REACTIVE PROTEIN: CPT

## 2023-10-09 RX ORDER — SODIUM CHLORIDE 0.9 % (FLUSH) 0.9 %
5-40 SYRINGE (ML) INJECTION EVERY 12 HOURS SCHEDULED
Status: DISCONTINUED | OUTPATIENT
Start: 2023-10-09 | End: 2023-10-10 | Stop reason: HOSPADM

## 2023-10-09 RX ORDER — SODIUM CHLORIDE 9 MG/ML
INJECTION, SOLUTION INTRAVENOUS PRN
Status: DISCONTINUED | OUTPATIENT
Start: 2023-10-09 | End: 2023-10-10 | Stop reason: HOSPADM

## 2023-10-09 RX ORDER — ENOXAPARIN SODIUM 100 MG/ML
40 INJECTION SUBCUTANEOUS DAILY
Status: DISCONTINUED | OUTPATIENT
Start: 2023-10-10 | End: 2023-10-09

## 2023-10-09 RX ORDER — ENOXAPARIN SODIUM 100 MG/ML
40 INJECTION SUBCUTANEOUS DAILY
Status: DISCONTINUED | OUTPATIENT
Start: 2023-10-09 | End: 2023-10-09

## 2023-10-09 RX ORDER — SODIUM CHLORIDE 0.9 % (FLUSH) 0.9 %
5-40 SYRINGE (ML) INJECTION PRN
Status: DISCONTINUED | OUTPATIENT
Start: 2023-10-09 | End: 2023-10-10 | Stop reason: HOSPADM

## 2023-10-09 RX ADMIN — POTASSIUM CHLORIDE 10 MEQ: 750 TABLET, EXTENDED RELEASE ORAL at 08:22

## 2023-10-09 RX ADMIN — Medication 10 ML: at 08:20

## 2023-10-09 RX ADMIN — OXYCODONE AND ACETAMINOPHEN 1 TABLET: 5; 325 TABLET ORAL at 13:49

## 2023-10-09 RX ADMIN — OXYCODONE AND ACETAMINOPHEN 1 TABLET: 5; 325 TABLET ORAL at 17:56

## 2023-10-09 RX ADMIN — FOLIC ACID 500 MCG: 1 TABLET ORAL at 08:20

## 2023-10-09 RX ADMIN — Medication 10 ML: at 21:06

## 2023-10-09 RX ADMIN — OXYCODONE AND ACETAMINOPHEN 1 TABLET: 5; 325 TABLET ORAL at 04:16

## 2023-10-09 RX ADMIN — GABAPENTIN 300 MG: 300 CAPSULE ORAL at 08:22

## 2023-10-09 RX ADMIN — METOPROLOL TARTRATE 50 MG: 50 TABLET ORAL at 21:06

## 2023-10-09 RX ADMIN — PROTHROMBIN, COAGULATION FACTOR VII HUMAN, COAGULATION FACTOR IX HUMAN, COAGULATION FACTOR X HUMAN, PROTEIN C, PROTEIN S HUMAN, AND WATER 2000 UNITS: KIT at 22:06

## 2023-10-09 RX ADMIN — GABAPENTIN 300 MG: 300 CAPSULE ORAL at 21:06

## 2023-10-09 RX ADMIN — FUROSEMIDE 20 MG: 20 TABLET ORAL at 08:21

## 2023-10-09 ASSESSMENT — PAIN SCALES - GENERAL
PAINLEVEL_OUTOF10: 6
PAINLEVEL_OUTOF10: 0
PAINLEVEL_OUTOF10: 0
PAINLEVEL_OUTOF10: 9
PAINLEVEL_OUTOF10: 10

## 2023-10-09 ASSESSMENT — PAIN - FUNCTIONAL ASSESSMENT: PAIN_FUNCTIONAL_ASSESSMENT: PREVENTS OR INTERFERES SOME ACTIVE ACTIVITIES AND ADLS

## 2023-10-09 ASSESSMENT — PAIN DESCRIPTION - DESCRIPTORS
DESCRIPTORS: SHARP;SHOOTING;STABBING
DESCRIPTORS: SHARP;STABBING;NAGGING
DESCRIPTORS: ACHING;DISCOMFORT;DULL

## 2023-10-09 ASSESSMENT — PAIN DESCRIPTION - ORIENTATION
ORIENTATION: MID;LOWER

## 2023-10-09 ASSESSMENT — PAIN DESCRIPTION - LOCATION
LOCATION: BACK

## 2023-10-09 ASSESSMENT — PAIN DESCRIPTION - PAIN TYPE: TYPE: ACUTE PAIN;CHRONIC PAIN

## 2023-10-09 ASSESSMENT — PAIN DESCRIPTION - ONSET: ONSET: GRADUAL

## 2023-10-09 ASSESSMENT — PAIN DESCRIPTION - FREQUENCY: FREQUENCY: INTERMITTENT

## 2023-10-09 NOTE — PLAN OF CARE
Problem: Pain  Goal: Verbalizes/displays adequate comfort level or baseline comfort level  Outcome: Completed     Problem: Safety - Adult  Goal: Free from fall injury  Outcome: Completed     Problem: Safety - Adult  Goal: Free from fall injury  Outcome: Completed

## 2023-10-09 NOTE — PROGRESS NOTES
I sent Dr. Berto Lewis a message regarding pain medication for this patient. He is requesting something else added to help with his pain. His MRI does show osteomyelitis and stenosis throughout his LS.

## 2023-10-09 NOTE — PROGRESS NOTES
Called [facility] Infectious disease office for consult. Dr Hector Rodriguez to follow.

## 2023-10-09 NOTE — PLAN OF CARE
Problem: Pain  Goal: Verbalizes/displays adequate comfort level or baseline comfort level  10/9/2023 0908 by Carlota Gomez RN  Outcome: Progressing     Problem: Chronic Conditions and Co-morbidities  Goal: Patient's chronic conditions and co-morbidity symptoms are monitored and maintained or improved  Outcome: Progressing     Problem: Discharge Planning  Goal: Discharge to home or other facility with appropriate resources  Outcome: Progressing     Problem: Skin/Tissue Integrity  Goal: Absence of new skin breakdown  Description: 1. Monitor for areas of redness and/or skin breakdown  2. Assess vascular access sites hourly  3. Every 4-6 hours minimum:  Change oxygen saturation probe site  4. Every 4-6 hours:  If on nasal continuous positive airway pressure, respiratory therapy assess nares and determine need for appliance change or resting period.   Outcome: Progressing     Problem: Pain  Goal: Verbalizes/displays adequate comfort level or baseline comfort level  10/8/2023 2216 by Gina Gann RN  Outcome: Completed     Problem: Safety - Adult  Goal: Free from fall injury  10/8/2023 2216 by Gina Gann RN  Outcome: Completed

## 2023-10-09 NOTE — CARE COORDINATION
10/09/23 Transition of care: Patient is a readmitted due to c/o worsening back pain after sustaining a recent fall and fracturing L1-2. He was found to have a discitis/possible osteomyelitis per MRI. Neurosurgery consult is pending. He is an Eliquis patient due to atrial fibrillation. PT 13/24 and OT 17/24 with HAO brace in room. He lives alone in a one story home. He ambulates without assistive devices and he drives. Call placed to [facility] and information regarding admission given. Will await pcp for further plan of care for patient. Neurosurgery to see patient today. Electronically signed by Jenna Riley RN CM on 10/9/2023 at 9:25 AM    Case Management Assessment  Initial Evaluation    Date/Time of Evaluation: 10/9/2023 11:24 AM  Assessment Completed by: Jenna Riley RN    If patient is discharged prior to next notation, then this note serves as note for discharge by case management. Patient Name: Mazin Rees                   YOB: 1960  Diagnosis: Hyponatremia [E87.1]  Ambulatory dysfunction [R26.2]  Back pain due to injury [M54.9]  Chronic low back pain, unspecified back pain laterality, unspecified whether sciatica present [M54.50, G89.29]                   Date / Time: 10/6/2023  1:36 PM    Patient Admission Status: Inpatient   Readmission Risk (Low < 19, Mod (19-27), High > 27): Readmission Risk Score: 19    Current PCP: No primary care provider on file. PCP verified by CM? Yes    Chart Reviewed: Yes      History Provided by: Patient  Patient Orientation: Alert and Oriented    Patient Cognition: Alert    Hospitalization in the last 30 days (Readmission):  Yes    If yes, Readmission Assessment in CM Navigator will be completed.     Advance Directives:      Code Status: Full Code   Patient's Primary Decision Maker is: Legal Next of Kin    Primary Decision Maker: billmariana - Brother/Sister - [phone]    Discharge Planning:    Patient lives with: Alone Type of Geeta Rajput RN  Case Management Department  Ph: [phone] Fax:

## 2023-10-09 NOTE — CONSULTS
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 IMPRESSION:     Low back pain , hx of fall, MRI ?  Discitis - blood cx neg     RECOMMENDATIONS:       Monitor off abx   Sed rate and CRP   Needs tissue biopsy, culture prior to committing long term IV abx       Thank you Dr Elle Alvarez for the consult

## 2023-10-10 ENCOUNTER — ANESTHESIA (OUTPATIENT)
Dept: OPERATING ROOM | Age: 63
End: 2023-10-10
Payer: OTHER GOVERNMENT

## 2023-10-10 ENCOUNTER — APPOINTMENT (OUTPATIENT)
Dept: GENERAL RADIOLOGY | Age: 63
End: 2023-10-10
Payer: OTHER GOVERNMENT

## 2023-10-10 ENCOUNTER — ANESTHESIA EVENT (OUTPATIENT)
Dept: OPERATING ROOM | Age: 63
End: 2023-10-10
Payer: OTHER GOVERNMENT

## 2023-10-10 LAB
ANION GAP SERPL CALCULATED.3IONS-SCNC: 11 MMOL/L (ref 7–16)
BUN SERPL-MCNC: 6 MG/DL (ref 6–23)
CALCIUM SERPL-MCNC: 7.9 MG/DL (ref 8.6–10.2)
CHLORIDE SERPL-SCNC: 98 MMOL/L (ref 98–107)
CO2 SERPL-SCNC: 21 MMOL/L (ref 22–29)
CREAT SERPL-MCNC: 0.5 MG/DL (ref 0.7–1.2)
ERYTHROCYTE [DISTWIDTH] IN BLOOD BY AUTOMATED COUNT: 12.7 % (ref 11.5–15)
GFR SERPL CREATININE-BSD FRML MDRD: >60 ML/MIN/1.73M2
GLUCOSE SERPL-MCNC: 95 MG/DL (ref 74–99)
HCT VFR BLD AUTO: 43 % (ref 37–54)
HGB BLD-MCNC: 14.9 G/DL (ref 12.5–16.5)
MCH RBC QN AUTO: 35.7 PG (ref 26–35)
MCHC RBC AUTO-ENTMCNC: 34.7 G/DL (ref 32–34.5)
MCV RBC AUTO: 103.1 FL (ref 80–99.9)
PLATELET # BLD AUTO: 191 K/UL (ref 130–450)
PMV BLD AUTO: 10.4 FL (ref 7–12)
POTASSIUM SERPL-SCNC: 4 MMOL/L (ref 3.5–5)
RBC # BLD AUTO: 4.17 M/UL (ref 3.8–5.8)
SODIUM SERPL-SCNC: 130 MMOL/L (ref 132–146)
WBC OTHER # BLD: 11.1 K/UL (ref 4.5–11.5)

## 2023-10-10 PROCEDURE — 86403 PARTICLE AGGLUT ANTBDY SCRN: CPT

## 2023-10-10 PROCEDURE — 2709999900 HC NON-CHARGEABLE SUPPLY: Performed by: NEUROLOGICAL SURGERY

## 2023-10-10 PROCEDURE — 6360000002 HC RX W HCPCS: Performed by: NEUROLOGICAL SURGERY

## 2023-10-10 PROCEDURE — 6370000000 HC RX 637 (ALT 250 FOR IP): Performed by: NEUROLOGICAL SURGERY

## 2023-10-10 PROCEDURE — 3600000004 HC SURGERY LEVEL 4 BASE: Performed by: NEUROLOGICAL SURGERY

## 2023-10-10 PROCEDURE — 93005 ELECTROCARDIOGRAM TRACING: CPT | Performed by: INTERNAL MEDICINE

## 2023-10-10 PROCEDURE — 6360000002 HC RX W HCPCS

## 2023-10-10 PROCEDURE — A4217 STERILE WATER/SALINE, 500 ML: HCPCS | Performed by: NEUROLOGICAL SURGERY

## 2023-10-10 PROCEDURE — 3700000000 HC ANESTHESIA ATTENDED CARE: Performed by: NEUROLOGICAL SURGERY

## 2023-10-10 PROCEDURE — 0SB20ZX EXCISION OF LUMBAR VERTEBRAL DISC, OPEN APPROACH, DIAGNOSTIC: ICD-10-PCS | Performed by: NEUROLOGICAL SURGERY

## 2023-10-10 PROCEDURE — 87075 CULTR BACTERIA EXCEPT BLOOD: CPT

## 2023-10-10 PROCEDURE — 2140000000 HC CCU INTERMEDIATE R&B

## 2023-10-10 PROCEDURE — 3700000001 HC ADD 15 MINUTES (ANESTHESIA): Performed by: NEUROLOGICAL SURGERY

## 2023-10-10 PROCEDURE — 80048 BASIC METABOLIC PNL TOTAL CA: CPT

## 2023-10-10 PROCEDURE — 6370000000 HC RX 637 (ALT 250 FOR IP): Performed by: INTERNAL MEDICINE

## 2023-10-10 PROCEDURE — 2580000003 HC RX 258: Performed by: INTERNAL MEDICINE

## 2023-10-10 PROCEDURE — 2720000010 HC SURG SUPPLY STERILE: Performed by: NEUROLOGICAL SURGERY

## 2023-10-10 PROCEDURE — 87077 CULTURE AEROBIC IDENTIFY: CPT

## 2023-10-10 PROCEDURE — 87102 FUNGUS ISOLATION CULTURE: CPT

## 2023-10-10 PROCEDURE — 7100000000 HC PACU RECOVERY - FIRST 15 MIN: Performed by: NEUROLOGICAL SURGERY

## 2023-10-10 PROCEDURE — 87116 MYCOBACTERIA CULTURE: CPT

## 2023-10-10 PROCEDURE — 2580000003 HC RX 258: Performed by: ANESTHESIOLOGY

## 2023-10-10 PROCEDURE — 00NY0ZZ RELEASE LUMBAR SPINAL CORD, OPEN APPROACH: ICD-10-PCS | Performed by: NEUROLOGICAL SURGERY

## 2023-10-10 PROCEDURE — 2580000003 HC RX 258: Performed by: NEUROLOGICAL SURGERY

## 2023-10-10 PROCEDURE — 3600000014 HC SURGERY LEVEL 4 ADDTL 15MIN: Performed by: NEUROLOGICAL SURGERY

## 2023-10-10 PROCEDURE — 2500000003 HC RX 250 WO HCPCS: Performed by: ANESTHESIOLOGY

## 2023-10-10 PROCEDURE — 85027 COMPLETE CBC AUTOMATED: CPT

## 2023-10-10 PROCEDURE — 87205 SMEAR GRAM STAIN: CPT

## 2023-10-10 PROCEDURE — 2580000003 HC RX 258: Performed by: NURSE ANESTHETIST, CERTIFIED REGISTERED

## 2023-10-10 PROCEDURE — 2500000003 HC RX 250 WO HCPCS: Performed by: INTERNAL MEDICINE

## 2023-10-10 PROCEDURE — 87015 SPECIMEN INFECT AGNT CONCNTJ: CPT

## 2023-10-10 PROCEDURE — 87070 CULTURE OTHR SPECIMN AEROBIC: CPT

## 2023-10-10 PROCEDURE — 36415 COLL VENOUS BLD VENIPUNCTURE: CPT

## 2023-10-10 PROCEDURE — 6360000002 HC RX W HCPCS: Performed by: NURSE ANESTHETIST, CERTIFIED REGISTERED

## 2023-10-10 PROCEDURE — 2500000003 HC RX 250 WO HCPCS: Performed by: NURSE ANESTHETIST, CERTIFIED REGISTERED

## 2023-10-10 PROCEDURE — 2500000003 HC RX 250 WO HCPCS: Performed by: NEUROLOGICAL SURGERY

## 2023-10-10 PROCEDURE — 7100000001 HC PACU RECOVERY - ADDTL 15 MIN: Performed by: NEUROLOGICAL SURGERY

## 2023-10-10 PROCEDURE — 6370000000 HC RX 637 (ALT 250 FOR IP): Performed by: FAMILY MEDICINE

## 2023-10-10 PROCEDURE — 87206 SMEAR FLUORESCENT/ACID STAI: CPT

## 2023-10-10 RX ORDER — DIAZEPAM 5 MG/1
5 TABLET ORAL EVERY 6 HOURS PRN
Status: DISCONTINUED | OUTPATIENT
Start: 2023-10-10 | End: 2023-10-14 | Stop reason: HOSPADM

## 2023-10-10 RX ORDER — MEPERIDINE HYDROCHLORIDE 25 MG/ML
INJECTION INTRAMUSCULAR; INTRAVENOUS; SUBCUTANEOUS
Status: COMPLETED
Start: 2023-10-10 | End: 2023-10-10

## 2023-10-10 RX ORDER — DOCUSATE SODIUM 100 MG/1
200 CAPSULE, LIQUID FILLED ORAL 2 TIMES DAILY
Status: DISCONTINUED | OUTPATIENT
Start: 2023-10-10 | End: 2023-10-14 | Stop reason: HOSPADM

## 2023-10-10 RX ORDER — VANCOMYCIN HYDROCHLORIDE 500 MG/10ML
INJECTION, POWDER, LYOPHILIZED, FOR SOLUTION INTRAVENOUS PRN
Status: DISCONTINUED | OUTPATIENT
Start: 2023-10-10 | End: 2023-10-10 | Stop reason: HOSPADM

## 2023-10-10 RX ORDER — ESMOLOL HYDROCHLORIDE 10 MG/ML
INJECTION INTRAVENOUS PRN
Status: DISCONTINUED | OUTPATIENT
Start: 2023-10-10 | End: 2023-10-10 | Stop reason: SDUPTHER

## 2023-10-10 RX ORDER — PROPOFOL 10 MG/ML
INJECTION, EMULSION INTRAVENOUS PRN
Status: DISCONTINUED | OUTPATIENT
Start: 2023-10-10 | End: 2023-10-10 | Stop reason: SDUPTHER

## 2023-10-10 RX ORDER — METOPROLOL TARTRATE 5 MG/5ML
INJECTION INTRAVENOUS PRN
Status: DISCONTINUED | OUTPATIENT
Start: 2023-10-10 | End: 2023-10-10 | Stop reason: SDUPTHER

## 2023-10-10 RX ORDER — FENTANYL CITRATE 50 UG/ML
INJECTION, SOLUTION INTRAMUSCULAR; INTRAVENOUS PRN
Status: DISCONTINUED | OUTPATIENT
Start: 2023-10-10 | End: 2023-10-10 | Stop reason: SDUPTHER

## 2023-10-10 RX ORDER — PHENYLEPHRINE HCL IN 0.9% NACL 1 MG/10 ML
SYRINGE (ML) INTRAVENOUS PRN
Status: DISCONTINUED | OUTPATIENT
Start: 2023-10-10 | End: 2023-10-10 | Stop reason: SDUPTHER

## 2023-10-10 RX ORDER — SODIUM CHLORIDE 0.9 % (FLUSH) 0.9 %
5-40 SYRINGE (ML) INJECTION EVERY 12 HOURS SCHEDULED
Status: DISCONTINUED | OUTPATIENT
Start: 2023-10-10 | End: 2023-10-10 | Stop reason: HOSPADM

## 2023-10-10 RX ORDER — LIDOCAINE HYDROCHLORIDE AND EPINEPHRINE 5; 5 MG/ML; UG/ML
INJECTION, SOLUTION INFILTRATION; PERINEURAL PRN
Status: DISCONTINUED | OUTPATIENT
Start: 2023-10-10 | End: 2023-10-10 | Stop reason: HOSPADM

## 2023-10-10 RX ORDER — SODIUM CHLORIDE 9 MG/ML
INJECTION, SOLUTION INTRAVENOUS PRN
Status: DISCONTINUED | OUTPATIENT
Start: 2023-10-10 | End: 2023-10-10 | Stop reason: HOSPADM

## 2023-10-10 RX ORDER — MEPERIDINE HYDROCHLORIDE 25 MG/ML
12.5 INJECTION INTRAMUSCULAR; INTRAVENOUS; SUBCUTANEOUS
Status: DISCONTINUED | OUTPATIENT
Start: 2023-10-10 | End: 2023-10-10 | Stop reason: HOSPADM

## 2023-10-10 RX ORDER — ONDANSETRON 2 MG/ML
4 INJECTION INTRAMUSCULAR; INTRAVENOUS
Status: DISCONTINUED | OUTPATIENT
Start: 2023-10-10 | End: 2023-10-10 | Stop reason: HOSPADM

## 2023-10-10 RX ORDER — ONDANSETRON 2 MG/ML
INJECTION INTRAMUSCULAR; INTRAVENOUS PRN
Status: DISCONTINUED | OUTPATIENT
Start: 2023-10-10 | End: 2023-10-10 | Stop reason: SDUPTHER

## 2023-10-10 RX ORDER — SODIUM CHLORIDE 9 MG/ML
INJECTION, SOLUTION INTRAVENOUS CONTINUOUS PRN
Status: DISCONTINUED | OUTPATIENT
Start: 2023-10-10 | End: 2023-10-10 | Stop reason: SDUPTHER

## 2023-10-10 RX ORDER — OXYCODONE HYDROCHLORIDE 10 MG/1
10 TABLET ORAL EVERY 4 HOURS PRN
Status: DISCONTINUED | OUTPATIENT
Start: 2023-10-10 | End: 2023-10-14

## 2023-10-10 RX ORDER — LIDOCAINE HYDROCHLORIDE 20 MG/ML
INJECTION, SOLUTION INTRAVENOUS PRN
Status: DISCONTINUED | OUTPATIENT
Start: 2023-10-10 | End: 2023-10-10 | Stop reason: SDUPTHER

## 2023-10-10 RX ORDER — SODIUM CHLORIDE, SODIUM LACTATE, POTASSIUM CHLORIDE, CALCIUM CHLORIDE 600; 310; 30; 20 MG/100ML; MG/100ML; MG/100ML; MG/100ML
INJECTION, SOLUTION INTRAVENOUS CONTINUOUS PRN
Status: DISCONTINUED | OUTPATIENT
Start: 2023-10-10 | End: 2023-10-10

## 2023-10-10 RX ORDER — DEXAMETHASONE SODIUM PHOSPHATE 10 MG/ML
INJECTION INTRAMUSCULAR; INTRAVENOUS PRN
Status: DISCONTINUED | OUTPATIENT
Start: 2023-10-10 | End: 2023-10-10 | Stop reason: SDUPTHER

## 2023-10-10 RX ORDER — BUPIVACAINE HYDROCHLORIDE 2.5 MG/ML
INJECTION, SOLUTION EPIDURAL; INFILTRATION; INTRACAUDAL PRN
Status: DISCONTINUED | OUTPATIENT
Start: 2023-10-10 | End: 2023-10-10 | Stop reason: HOSPADM

## 2023-10-10 RX ORDER — MIDAZOLAM HYDROCHLORIDE 1 MG/ML
INJECTION INTRAMUSCULAR; INTRAVENOUS PRN
Status: DISCONTINUED | OUTPATIENT
Start: 2023-10-10 | End: 2023-10-10 | Stop reason: SDUPTHER

## 2023-10-10 RX ORDER — ENEMA 19; 7 G/133ML; G/133ML
1 ENEMA RECTAL
Status: ACTIVE | OUTPATIENT
Start: 2023-10-10 | End: 2023-10-11

## 2023-10-10 RX ORDER — ROCURONIUM BROMIDE 10 MG/ML
INJECTION, SOLUTION INTRAVENOUS PRN
Status: DISCONTINUED | OUTPATIENT
Start: 2023-10-10 | End: 2023-10-10 | Stop reason: SDUPTHER

## 2023-10-10 RX ORDER — HYDROMORPHONE HYDROCHLORIDE 1 MG/ML
0.5 INJECTION, SOLUTION INTRAMUSCULAR; INTRAVENOUS; SUBCUTANEOUS EVERY 5 MIN PRN
Status: DISCONTINUED | OUTPATIENT
Start: 2023-10-10 | End: 2023-10-10 | Stop reason: HOSPADM

## 2023-10-10 RX ORDER — HYDROMORPHONE HYDROCHLORIDE 1 MG/ML
0.25 INJECTION, SOLUTION INTRAMUSCULAR; INTRAVENOUS; SUBCUTANEOUS EVERY 5 MIN PRN
Status: DISCONTINUED | OUTPATIENT
Start: 2023-10-10 | End: 2023-10-10 | Stop reason: HOSPADM

## 2023-10-10 RX ORDER — CEFAZOLIN SODIUM 1 G/3ML
INJECTION, POWDER, FOR SOLUTION INTRAMUSCULAR; INTRAVENOUS PRN
Status: DISCONTINUED | OUTPATIENT
Start: 2023-10-10 | End: 2023-10-10 | Stop reason: SDUPTHER

## 2023-10-10 RX ORDER — OXYCODONE HYDROCHLORIDE 5 MG/1
5 TABLET ORAL EVERY 4 HOURS PRN
Status: DISCONTINUED | OUTPATIENT
Start: 2023-10-10 | End: 2023-10-14

## 2023-10-10 RX ORDER — SODIUM CHLORIDE 0.9 % (FLUSH) 0.9 %
5-40 SYRINGE (ML) INJECTION PRN
Status: DISCONTINUED | OUTPATIENT
Start: 2023-10-10 | End: 2023-10-10 | Stop reason: HOSPADM

## 2023-10-10 RX ADMIN — GABAPENTIN 300 MG: 300 CAPSULE ORAL at 22:23

## 2023-10-10 RX ADMIN — FENTANYL CITRATE 50 MCG: 0.05 INJECTION, SOLUTION INTRAMUSCULAR; INTRAVENOUS at 12:33

## 2023-10-10 RX ADMIN — SODIUM CHLORIDE: 9 INJECTION, SOLUTION INTRAVENOUS at 11:31

## 2023-10-10 RX ADMIN — ONDANSETRON 4 MG: 2 INJECTION INTRAMUSCULAR; INTRAVENOUS at 12:57

## 2023-10-10 RX ADMIN — DEXAMETHASONE SODIUM PHOSPHATE 10 MG: 10 INJECTION INTRAMUSCULAR; INTRAVENOUS at 11:15

## 2023-10-10 RX ADMIN — MEPERIDINE HYDROCHLORIDE 12.5 MG: 25 INJECTION INTRAMUSCULAR; INTRAVENOUS; SUBCUTANEOUS at 14:03

## 2023-10-10 RX ADMIN — FENTANYL CITRATE 50 MCG: 0.05 INJECTION, SOLUTION INTRAMUSCULAR; INTRAVENOUS at 11:04

## 2023-10-10 RX ADMIN — ESMOLOL HYDROCHLORIDE 40 MG: 10 INJECTION, SOLUTION INTRAVENOUS at 11:11

## 2023-10-10 RX ADMIN — Medication 100 MCG: at 12:49

## 2023-10-10 RX ADMIN — SODIUM CHLORIDE 5 MG/HR: 900 INJECTION, SOLUTION INTRAVENOUS at 16:38

## 2023-10-10 RX ADMIN — DOCUSATE SODIUM 200 MG: 100 CAPSULE, LIQUID FILLED ORAL at 22:27

## 2023-10-10 RX ADMIN — METOPROLOL TARTRATE 50 MG: 50 TABLET ORAL at 22:23

## 2023-10-10 RX ADMIN — ROCURONIUM BROMIDE 50 MG: 10 INJECTION, SOLUTION INTRAVENOUS at 11:04

## 2023-10-10 RX ADMIN — MIDAZOLAM 2 MG: 1 INJECTION INTRAMUSCULAR; INTRAVENOUS at 10:55

## 2023-10-10 RX ADMIN — OXYCODONE HYDROCHLORIDE 10 MG: 10 TABLET ORAL at 22:34

## 2023-10-10 RX ADMIN — OXYCODONE AND ACETAMINOPHEN 1 TABLET: 5; 325 TABLET ORAL at 04:55

## 2023-10-10 RX ADMIN — ROCURONIUM BROMIDE 10 MG: 10 INJECTION, SOLUTION INTRAVENOUS at 11:38

## 2023-10-10 RX ADMIN — METOPROLOL TARTRATE 5 MG: 5 INJECTION INTRAVENOUS at 11:15

## 2023-10-10 RX ADMIN — SUGAMMADEX 200 MG: 100 INJECTION, SOLUTION INTRAVENOUS at 13:14

## 2023-10-10 RX ADMIN — PROPOFOL 100 MG: 10 INJECTION, EMULSION INTRAVENOUS at 11:04

## 2023-10-10 RX ADMIN — VANCOMYCIN HYDROCHLORIDE 1000 MG: 1 INJECTION, POWDER, LYOPHILIZED, FOR SOLUTION INTRAVENOUS at 22:29

## 2023-10-10 RX ADMIN — ROCURONIUM BROMIDE 10 MG: 10 INJECTION, SOLUTION INTRAVENOUS at 12:33

## 2023-10-10 RX ADMIN — Medication 100 MCG: at 12:44

## 2023-10-10 RX ADMIN — SODIUM CHLORIDE: 9 INJECTION, SOLUTION INTRAVENOUS at 10:55

## 2023-10-10 RX ADMIN — Medication 10 ML: at 22:24

## 2023-10-10 RX ADMIN — METOPROLOL TARTRATE 5 MG: 5 INJECTION INTRAVENOUS at 11:21

## 2023-10-10 RX ADMIN — SODIUM CHLORIDE 5 MG/HR: 900 INJECTION, SOLUTION INTRAVENOUS at 11:44

## 2023-10-10 RX ADMIN — LIDOCAINE HYDROCHLORIDE 40 MG: 20 INJECTION, SOLUTION INTRAVENOUS at 11:04

## 2023-10-10 RX ADMIN — HYDROMORPHONE HYDROCHLORIDE 0.25 MG: 1 INJECTION, SOLUTION INTRAMUSCULAR; INTRAVENOUS; SUBCUTANEOUS at 14:30

## 2023-10-10 RX ADMIN — CEFAZOLIN 2 G: 1 INJECTION, POWDER, FOR SOLUTION INTRAMUSCULAR; INTRAVENOUS at 11:15

## 2023-10-10 ASSESSMENT — PAIN - FUNCTIONAL ASSESSMENT: PAIN_FUNCTIONAL_ASSESSMENT: PREVENTS OR INTERFERES SOME ACTIVE ACTIVITIES AND ADLS

## 2023-10-10 ASSESSMENT — PAIN DESCRIPTION - FREQUENCY: FREQUENCY: INTERMITTENT

## 2023-10-10 ASSESSMENT — PAIN SCALES - GENERAL
PAINLEVEL_OUTOF10: 10
PAINLEVEL_OUTOF10: 10
PAINLEVEL_OUTOF10: 5

## 2023-10-10 ASSESSMENT — PAIN DESCRIPTION - ORIENTATION: ORIENTATION: MID

## 2023-10-10 ASSESSMENT — PAIN DESCRIPTION - DESCRIPTORS
DESCRIPTORS: BURNING
DESCRIPTORS: SHARP;BURNING;JABBING

## 2023-10-10 ASSESSMENT — PAIN DESCRIPTION - LOCATION
LOCATION: BACK
LOCATION: BACK

## 2023-10-10 ASSESSMENT — PAIN SCALES - WONG BAKER: WONGBAKER_NUMERICALRESPONSE: 0

## 2023-10-10 ASSESSMENT — PAIN DESCRIPTION - PAIN TYPE: TYPE: SURGICAL PAIN

## 2023-10-10 NOTE — PROGRESS NOTES
PATIENT CAME TO OR WITH DENTURES. PLACED IN CUP WITH PATIENT STICKER.  WILL BE WITH PATIENT POST OP IN PACU

## 2023-10-10 NOTE — PROGRESS NOTES
Department of Internal Medicine  Infectious Diseases  Progress  Note      C/C : Low back pain r/o discitis     Pt is back from surgery   Developed a fib with RVR   Denies fever   Reports back pain   Afebrile        Current Facility-Administered Medications   Medication Dose Route Frequency Provider Last Rate Last Admin    dilTIAZem 100 mg in sodium chloride 0.9 % 100 mL infusion (ADD-Golden)  2.5-15 mg/hr IntraVENous Continuous LesherLeatha M, DO 5 mL/hr at 10/10/23 1638 5 mg/hr at 10/10/23 1638    furosemide (LASIX) tablet 20 mg  20 mg Oral Daily Martina Mcgovern MD   20 mg at 10/09/23 6572    potassium chloride (KLOR-CON M) extended release tablet 10 mEq  10 mEq Oral Daily Martina Mcgovern MD   10 mEq at 32/26/98 0027    folic acid (FOLVITE) tablet 500 mcg  500 mcg Oral Daily Katie Valerio MD   500 mcg at 10/09/23 0820    gabapentin (NEURONTIN) capsule 300 mg  300 mg Oral BID Katie Valerio MD   300 mg at 10/09/23 2106    metoprolol tartrate (LOPRESSOR) tablet 50 mg  50 mg Oral BID Katie Valerio MD   50 mg at 10/09/23 2106    sodium chloride flush 0.9 % injection 5-40 mL  5-40 mL IntraVENous 2 times per day Katie Valerio MD   10 mL at 10/09/23 2106    sodium chloride flush 0.9 % injection 5-40 mL  5-40 mL IntraVENous PRN Katie Valerio MD        0.9 % sodium chloride infusion   IntraVENous PRN Katie Valerio MD        ondansetron (ZOFRAN-ODT) disintegrating tablet 4 mg  4 mg Oral Q8H PRN Katie Valerio MD        Or    ondansetron [facility]) injection 4 mg  4 mg IntraVENous Q6H PRN Katie Valerio MD        senna (SENOKOT) tablet 8.6 mg  1 tablet Oral Daily PRN Katie Valerio MD        aluminum & magnesium hydroxide-simethicone (MAALOX) 200-200-20 MG/5ML suspension 30 mL  30 mL Oral Q6H PRN Katie Valerio MD        acetaminophen (TYLENOL) tablet 650 mg  650 mg Oral Q6H PRN Katie Valerio MD   650 mg at 10/07/23 0524    Or    acetaminophen (TYLENOL) suppository 650 mg  650 mg

## 2023-10-10 NOTE — ANESTHESIA PRE PROCEDURE
Department of Anesthesiology  Preprocedure Note       Name:  Stephane Gonzalez   Age:  57 y.o.  :  1960                                          MRN:  [id number]         Date:  10/10/2023      Surgeon: Hollis Grace):  Celia Quiroz MD    Procedure: Procedure(s):  L-3L5 LUMBAR LAMINECTOMY POSTERIOR/ LUCAS TABLE, C-ARM    Medications prior to admission:   Prior to Admission medications    Medication Sig Start Date End Date Taking? Authorizing Provider   oxyCODONE-acetaminophen (PERCOCET) 5-325 MG per tablet Take 1 tablet by mouth every 4 hours as needed for Pain. Max Daily Amount: 6 tablets   Yes Donna Perera MD   sennosides-docusate sodium (SENOKOT-S) 8.6-50 MG tablet Take 1 tablet by mouth 2 times daily Laxative to be taken If taking opioid pain killers (percocet)  Patient not taking: Reported on 10/6/2023 9/21/23   Roni Castillo MD   furosemide (LASIX) 20 MG tablet Take 1 tablet by mouth daily 23   Roni Castillo MD   zinc oxide 20 % ointment Apply topically as needed.  23   Roni Castillo MD   lidocaine 4 % external patch Place 1 patch onto the skin daily as needed (as needed for pain) 23   Roni Castillo MD   diphenhydrAMINE (BENADRYL) 25 MG tablet Take 1 tablet by mouth every 6 hours as needed for Itching 9/21/23 10/21/23  Roni Castillo MD   apixaban (ELIQUIS) 5 MG TABS tablet Take 1 tablet by mouth 2 times daily 23   Roni Castillo MD   ascorbic acid (VITAMIN C) 500 MG tablet TAKE ONE TABLET BY MOUTH EVERY DAY 21   Donna Perera MD   vitamin D (CHOLECALCIFEROL) 25 MCG (1000 UT) TABS tablet TAKE TWO TABLETS BY MOUTH EVERY DAY 21   Donna Perera MD   folic acid (FOLVITE) 795 MCG tablet TAKE ONE TABLET BY MOUTH EVERY DAY 21   Donna Perera MD   gabapentin (NEURONTIN) 300 MG capsule TAKE ONE CAPSULE BY MOUTH TWICE A DAY 10/3/22   Donna Perera MD   metoprolol (LOPRESSOR) 100 MG tablet Take 0.5 tablets by mouth 2 times daily 10/15/22

## 2023-10-10 NOTE — BRIEF OP NOTE
Brief Postoperative Note      Patient: Leland Brito  YOB: 1960  MRN: [id number]    Date of Procedure: 10/10/2023    Pre-Op Diagnosis Codes:     * Epidural abscess, L2-L5 [G06.1]    Post-Op Diagnosis: Same       Procedure(s):  L3-L5 LUMBAR LAMINECTOMY POSTERIOR, L4-L5 DISK BIOPSY    Surgeon(s):  Justin Hartley MD    Assistant:  Resident:  Angelica Hobson DO    Anesthesia: General    Estimated Blood Loss (mL): less than 098     Complications: None    Specimens:   ID Type Source Tests Collected by Time Destination   1 : L4-L5 DISC #1 Tissue Spine CULTURE, ANAEROBIC, CULTURE, FUNGUS, GRAM STAIN (Canceled), CULTURE, SURGICAL, CULTURE WITH SMEAR, ACID FAST Bi Ibarra MD 10/10/2023 1236    2 : L4-L5 DISC #2 Tissue Spine CULTURE, ANAEROBIC, CULTURE, FUNGUS (Canceled), GRAM STAIN (Canceled), CULTURE, SURGICAL, CULTURE WITH SMEAR, ACID FAST BACILLIUS (Canceled) Justin Hartley MD 10/10/2023 1236        Implants:  * No implants in log *      Drains:   Closed/Suction Drain Inferior;Posterior Back Accordion (Active)   Site Description Unable to view 10/10/23 1859   Dressing Status Clean, dry & intact 10/10/23 1859   Drainage Appearance Bloody 10/10/23 1859   Drain Status Compressed 10/10/23 1445   Output (ml) 170 ml 10/10/23 1859       [REMOVED] Urinary Catheter 10/10/23 2 Way (Removed)   Site Assessment No urethral drainage 10/10/23 1345   Urine Color Sapna 10/10/23 1445   Urine Appearance Hazy 10/10/23 1445   Collection Container Standard 10/10/23 1345   Output (mL) 100 mL 10/10/23 1445       Findings: see dictated op note      Electronically signed by Corrina Slaughter MD on 10/10/2023 at 7:49 PM

## 2023-10-10 NOTE — PROGRESS NOTES
Spoke with Dr. Henry Lyon regarding Cardizem drip infusing from OR. He states to stop Cardizem & observe patient for an hour after. Patient's HR , A-fib on the monitor.

## 2023-10-10 NOTE — PROGRESS NOTES
Patient dentures with him from OR & are being sent to the floor. Patient pulled at his IV tubing and pulled his IVF off. IV site remains ok.

## 2023-10-10 NOTE — CARE COORDINATION
10/10/23 Update CM note: Spoke with Kathe Andino the  from [facility] [phone] ext [phone]. Gutierrez Palmer She states patient will need to transfer to [facility] in order to have iv antibiotic therapy if needed. She states he would transfer to the medical unit and will need [facility]. This will happen once the need for iv antibiotic therapy is determined.  Electronically signed by Apoorva Díaz RN CM on 10/10/2023 at 3:46 PM

## 2023-10-10 NOTE — CARE COORDINATION
10/10/23 Update CM Note: patient currently in OR for abscess/lumbar laminectomy posterior. Will see patient when he returns to room. Spoke with Fang Bentley at [facility] and the assigned  is Hernan Verma at [phone]. Message left for Dolly Wang to return call to .  Electronically signed by Alejandra Poe RN CM on 10/10/2023 at 1:40 PM

## 2023-10-10 NOTE — ANESTHESIA POSTPROCEDURE EVALUATION
Department of Anesthesiology  Postprocedure Note    Patient: Adalid Roblero  MRN: [id number]  YOB: 1960  Date of evaluation: 10/10/2023      Procedure Summary     Date: 10/10/23 Room / Location: [facility] OR 06 / [facility]    Anesthesia Start: 1055 Anesthesia Stop: 1333    Procedure: L3-L5 LUMBAR LAMINECTOMY POSTERIOR, L4-L5 DISK BIOPSY (Bilateral: Spine Lumbar) Diagnosis:       Epidural abscess, L2-L5      (Epidural abscess, L2-L5 [G06.1])    Surgeons: Santiago Hutchison MD Responsible Provider: Adriana Perkins DO    Anesthesia Type: General ASA Status: 3          Anesthesia Type: General    Von Phase I: Von Score: 10    Von Phase II:        Anesthesia Post Evaluation    Patient location during evaluation: PACU  Patient participation: complete - patient participated  Level of consciousness: awake and alert  Airway patency: patent  Nausea & Vomiting: no nausea and no vomiting  Complications: no  Cardiovascular status: blood pressure returned to baseline  Respiratory status: acceptable  Hydration status: euvolemic  Multimodal analgesia pain management approach  Pain management: adequate

## 2023-10-11 LAB
ANION GAP SERPL CALCULATED.3IONS-SCNC: 9 MMOL/L (ref 7–16)
BUN SERPL-MCNC: 8 MG/DL (ref 6–23)
CALCIUM SERPL-MCNC: 7.4 MG/DL (ref 8.6–10.2)
CHLORIDE SERPL-SCNC: 100 MMOL/L (ref 98–107)
CO2 SERPL-SCNC: 20 MMOL/L (ref 22–29)
CREAT SERPL-MCNC: 0.5 MG/DL (ref 0.7–1.2)
EKG ATRIAL RATE: 120 BPM
EKG Q-T INTERVAL: 320 MS
EKG QRS DURATION: 86 MS
EKG QTC CALCULATION (BAZETT): 450 MS
EKG R AXIS: 24 DEGREES
EKG T AXIS: 57 DEGREES
EKG VENTRICULAR RATE: 119 BPM
ERYTHROCYTE [DISTWIDTH] IN BLOOD BY AUTOMATED COUNT: 12.6 % (ref 11.5–15)
GFR SERPL CREATININE-BSD FRML MDRD: >60 ML/MIN/1.73M2
GLUCOSE SERPL-MCNC: 224 MG/DL (ref 74–99)
HCT VFR BLD AUTO: 40.1 % (ref 37–54)
HGB BLD-MCNC: 13.7 G/DL (ref 12.5–16.5)
MCH RBC QN AUTO: 36.1 PG (ref 26–35)
MCHC RBC AUTO-ENTMCNC: 34.2 G/DL (ref 32–34.5)
MCV RBC AUTO: 105.8 FL (ref 80–99.9)
MICROORGANISM SPEC CULT: ABNORMAL
PLATELET # BLD AUTO: 246 K/UL (ref 130–450)
PMV BLD AUTO: 9.6 FL (ref 7–12)
POTASSIUM SERPL-SCNC: 4 MMOL/L (ref 3.5–5)
RBC # BLD AUTO: 3.79 M/UL (ref 3.8–5.8)
SODIUM SERPL-SCNC: 129 MMOL/L (ref 132–146)
SPECIMEN DESCRIPTION: ABNORMAL
WBC OTHER # BLD: 16.4 K/UL (ref 4.5–11.5)

## 2023-10-11 PROCEDURE — 2500000003 HC RX 250 WO HCPCS: Performed by: INTERNAL MEDICINE

## 2023-10-11 PROCEDURE — 97168 OT RE-EVAL EST PLAN CARE: CPT

## 2023-10-11 PROCEDURE — 2140000000 HC CCU INTERMEDIATE R&B

## 2023-10-11 PROCEDURE — 97535 SELF CARE MNGMENT TRAINING: CPT

## 2023-10-11 PROCEDURE — 6370000000 HC RX 637 (ALT 250 FOR IP): Performed by: INTERNAL MEDICINE

## 2023-10-11 PROCEDURE — 2580000003 HC RX 258: Performed by: INTERNAL MEDICINE

## 2023-10-11 PROCEDURE — 93308 TTE F-UP OR LMTD: CPT

## 2023-10-11 PROCEDURE — 36415 COLL VENOUS BLD VENIPUNCTURE: CPT

## 2023-10-11 PROCEDURE — 87040 BLOOD CULTURE FOR BACTERIA: CPT

## 2023-10-11 PROCEDURE — 85027 COMPLETE CBC AUTOMATED: CPT

## 2023-10-11 PROCEDURE — 97530 THERAPEUTIC ACTIVITIES: CPT

## 2023-10-11 PROCEDURE — 97164 PT RE-EVAL EST PLAN CARE: CPT

## 2023-10-11 PROCEDURE — 6370000000 HC RX 637 (ALT 250 FOR IP): Performed by: NEUROLOGICAL SURGERY

## 2023-10-11 PROCEDURE — 2580000003 HC RX 258: Performed by: NEUROLOGICAL SURGERY

## 2023-10-11 PROCEDURE — 80048 BASIC METABOLIC PNL TOTAL CA: CPT

## 2023-10-11 PROCEDURE — 6360000002 HC RX W HCPCS: Performed by: NEUROLOGICAL SURGERY

## 2023-10-11 RX ADMIN — GABAPENTIN 300 MG: 300 CAPSULE ORAL at 22:06

## 2023-10-11 RX ADMIN — GABAPENTIN 300 MG: 300 CAPSULE ORAL at 09:31

## 2023-10-11 RX ADMIN — FUROSEMIDE 20 MG: 20 TABLET ORAL at 09:32

## 2023-10-11 RX ADMIN — DOCUSATE SODIUM 200 MG: 100 CAPSULE, LIQUID FILLED ORAL at 22:06

## 2023-10-11 RX ADMIN — DOCUSATE SODIUM 200 MG: 100 CAPSULE, LIQUID FILLED ORAL at 09:31

## 2023-10-11 RX ADMIN — OXYCODONE HYDROCHLORIDE 10 MG: 10 TABLET ORAL at 23:12

## 2023-10-11 RX ADMIN — METOPROLOL TARTRATE 50 MG: 50 TABLET ORAL at 09:31

## 2023-10-11 RX ADMIN — POTASSIUM CHLORIDE 10 MEQ: 750 TABLET, EXTENDED RELEASE ORAL at 09:31

## 2023-10-11 RX ADMIN — METOPROLOL TARTRATE 50 MG: 50 TABLET ORAL at 22:06

## 2023-10-11 RX ADMIN — SODIUM CHLORIDE 5 MG/HR: 900 INJECTION, SOLUTION INTRAVENOUS at 10:51

## 2023-10-11 RX ADMIN — VANCOMYCIN HYDROCHLORIDE 1000 MG: 1 INJECTION, POWDER, LYOPHILIZED, FOR SOLUTION INTRAVENOUS at 22:11

## 2023-10-11 RX ADMIN — Medication 10 ML: at 09:33

## 2023-10-11 RX ADMIN — OXYCODONE HYDROCHLORIDE 10 MG: 10 TABLET ORAL at 04:41

## 2023-10-11 RX ADMIN — OXYCODONE HYDROCHLORIDE 10 MG: 10 TABLET ORAL at 09:32

## 2023-10-11 RX ADMIN — Medication 10 ML: at 22:06

## 2023-10-11 RX ADMIN — FOLIC ACID 500 MCG: 1 TABLET ORAL at 09:31

## 2023-10-11 RX ADMIN — VANCOMYCIN HYDROCHLORIDE 1000 MG: 1 INJECTION, POWDER, LYOPHILIZED, FOR SOLUTION INTRAVENOUS at 09:29

## 2023-10-11 RX ADMIN — DIAZEPAM 5 MG: 5 TABLET ORAL at 22:06

## 2023-10-11 ASSESSMENT — PAIN - FUNCTIONAL ASSESSMENT
PAIN_FUNCTIONAL_ASSESSMENT: PREVENTS OR INTERFERES SOME ACTIVE ACTIVITIES AND ADLS

## 2023-10-11 ASSESSMENT — PAIN DESCRIPTION - PAIN TYPE: TYPE: SURGICAL PAIN

## 2023-10-11 ASSESSMENT — PAIN DESCRIPTION - ORIENTATION
ORIENTATION: MID
ORIENTATION: MID
ORIENTATION: MID;LOWER

## 2023-10-11 ASSESSMENT — PAIN DESCRIPTION - DESCRIPTORS
DESCRIPTORS: ACHING;JABBING;SORE
DESCRIPTORS: ACHING;DISCOMFORT;SORE
DESCRIPTORS: ACHING;DISCOMFORT;JABBING

## 2023-10-11 ASSESSMENT — PAIN SCALES - GENERAL
PAINLEVEL_OUTOF10: 10
PAINLEVEL_OUTOF10: 9
PAINLEVEL_OUTOF10: 7
PAINLEVEL_OUTOF10: 3

## 2023-10-11 ASSESSMENT — PAIN DESCRIPTION - LOCATION
LOCATION: BACK

## 2023-10-11 ASSESSMENT — PAIN DESCRIPTION - FREQUENCY: FREQUENCY: INTERMITTENT

## 2023-10-11 ASSESSMENT — PAIN DESCRIPTION - ONSET: ONSET: GRADUAL

## 2023-10-11 NOTE — PROGRESS NOTES
Department of Internal Medicine  Infectious Diseases  Progress  Note      C/C : Low back pain r/o discitis     Pt is back from surgery   Developed a fib with RVR   Denies fever   Reports back pain   Afebrile        Current Facility-Administered Medications   Medication Dose Route Frequency Provider Last Rate Last Admin    dilTIAZem 100 mg in sodium chloride 0.9 % 100 mL infusion (ADD-Hoffman)  2.5-15 mg/hr IntraVENous Continuous Juan F Garcia DO 5 mL/hr at 10/10/23 1638 5 mg/hr at 10/10/23 1638    oxyCODONE (ROXICODONE) immediate release tablet 5 mg  5 mg Oral Q4H PRN Celia Quiroz MD        Or    oxyCODONE HCl (OXY-IR) immediate release tablet 10 mg  10 mg Oral Q4H PRN Celia Quiroz MD   10 mg at 10/11/23 0932    HYDROmorphone (DILAUDID) injection 0.5 mg  0.5 mg IntraVENous Q3H PRN Celia Quiroz MD        diazePAM (VALIUM) tablet 5 mg  5 mg Oral Q6H PRN Celia Quiroz MD        docusate sodium (COLACE) capsule 200 mg  200 mg Oral BID Celia Quiroz MD   200 mg at 10/11/23 0931    sodium phosphate (FLEET) rectal enema 1 enema  1 enema Rectal Once PRN Celia Quiroz MD        vancomycin (VANCOCIN) 1,000 mg in sodium chloride 0.9 % 250 mL IVPB ([id number])  1,000 mg IntraVENous Q12H Celia Quiroz  mL/hr at 10/11/23 0929 1,000 mg at 10/11/23 0929    furosemide (LASIX) tablet 20 mg  20 mg Oral Daily Donald Thompson MD   20 mg at 10/11/23 0932    potassium chloride (KLOR-CON M) extended release tablet 10 mEq  10 mEq Oral Daily Donald Thompson MD   10 mEq at 25/05/50 9747    folic acid (FOLVITE) tablet 500 mcg  500 mcg Oral Daily Chantelle Sales MD   500 mcg at 10/11/23 0931    gabapentin (NEURONTIN) capsule 300 mg  300 mg Oral BID Chantelle Sales MD   300 mg at 10/11/23 0931    metoprolol tartrate (LOPRESSOR) tablet 50 mg  50 mg Oral BID Chantelle Sales MD   50 mg at 10/11/23 0931    sodium chloride flush 0.9 % injection 5-40 mL  5-40 mL IntraVENous 2 times per day Chantelle Sales MD   10 mL at

## 2023-10-11 NOTE — OP NOTE
[address], [address] [place] [address]                                OPERATIVE REPORT    PATIENT NAME: Rip Negrete                    :        1960  MED REC NO:   [id number]                            ROOM:       6523  ACCOUNT NO:   [de-identified]                           ADMIT DATE: 10/06/2023  PROVIDER:     Serenity Castrejon MD    DATE OF PROCEDURE:  10/10/2023    PREOPERATIVE DIAGNOSES:  1. L3-L5 lumbar canal stenosis. 2. L4-L5 epidural abscess and diskitis. POSTOPERATIVE DIAGNOSES:  1. L3-L5 lumbar canal stenosis. 2. L4-L5 epidural abscess and diskitis. OPERATIVE PROCEDURES:  1.  Bilateral L3, L4, and L5 laminectomy; bilateral L3-L4 and L4-L5  medial facetectomy; and L3, L4, and L5 foraminotomy. 2.  Biopsy of L4-L5 disk space. 3.  Use of intraoperative fluoroscopy interpreted by myself, the  surgeon. ANESTHESIA:  Generalized endotracheal anesthesia. SURGEON:  Serenity Castrejon MD    ASSISTANT:  John Reyna DO    COMPLICATIONS:  None. ESTIMATED BLOOD LOSS:  100 mL. SPECIMEN:  Disk. OPERATIVE INDICATIONS:  The patient is a 69-year-old gentleman, who  presented to the hospital with urinary symptoms and bilateral leg  weakness. He was found to have vertebral body osteomyelitis at L4 and  L5 with diskitis of L4-L5 and ventral epidural abscess with severe  stenosis from L3-L5, and after risks, benefits, and alternatives were  discussed with the patient, it was determined that he would undergo the  above-listed procedure. DESCRIPTION OF PROCEDURE:  The patient was brought into the operating  room. A time-out was performed where he was identified by his name,  medical record number, and the operative procedure, which he was about  to undergo. Next, induction of generalized endotracheal anesthesia was  then commenced.   Upon completion of induction of generalized  endotracheal anesthesia, he received

## 2023-10-11 NOTE — PROGRESS NOTES
Physical Therapy  Physical Therapy Re-Evaluation      Name: Tyler Last  : 1960  MRN: [id number]      Date of Service: 10/11/2023    Evaluating/R-Evaluating PT:  Ellis Willis, PT, DPT, [id number]    Room #:  3811/0379-M  Diagnosis:  Hyponatremia [E87.1]  Ambulatory dysfunction [R26.2]  Back pain due to injury [M54.9]  Chronic low back pain, unspecified back pain laterality, unspecified whether sciatica present [M54.50, G89.29]  PMHx/PSHx:    Past Medical History:   Diagnosis Date    Atrial fibrillation with RVR ([address])       Past Surgical History:   Procedure Laterality Date    HIP FRACTURE SURGERY Right     Rt Hip. Operated on 4 times. KNEE SURGERY Left     LAMINECTOMY Bilateral 10/10/2023    L3-L5 LUMBAR LAMINECTOMY POSTERIOR, L4-L5 DISK BIOPSY performed by Aviva Murray MD at [facility] Right       Procedure/Surgery:  L3-L5 LUMBAR LAMINECTOMY POSTERIOR, L4-L5 DISK BIOPSY 10/10/23  Precautions:  fall risk, spinal, cognition, poor safety awareness, TLSO  Equipment Needs:  TBD    SUBJECTIVE:    Pt lives alone in a 1 story home with 3 stairs to enter and 2 rail. Bed is on 1 floor and bath is on 1 floor. Pt ambulated with no AD PTA. OBJECTIVE:   Initial Evaluation  Date: 10/7/23 Re-Evaluation Date: 10/11/23 Short Term/ Long Term   Goals   AM-PAC 6 Clicks 51/51 50/25    Was pt agreeable to Eval/treatment? yes yes    Does pt have pain? 10/10 back pain 8.5/10 back pain    Bed Mobility  Rolling: NT  Supine to sit: NT  Sit to supine: NT  Scooting: NT Rolling: ModA  Supine to sit: ModA  Sit to supine: NT  Scooting: Orly Rolling: Independent   Supine to sit: Independent   Sit to supine: Independent   Scooting: Independent    Transfers Sit to stand: ModA  Stand to sit: Orly  Stand pivot: Orly with [address] [place] [address] Sit to stand: MaxA  Stand to sit: MaxA  Stand pivot: Orly with [facility] [address] Sit to stand: Independent   Stand to sit:  Independent   Stand pivot: mod Independent with AD   Ambulation    100x2  with [address] [place] [address] Orly ~3

## 2023-10-11 NOTE — CARE COORDINATION
Transition of care update: Received pt in transfer from 82 unit. LOS:day 5. L3-5 laminectomy on 10/10. Iv cardizem discontinued. IV vanco 1000mg q 12 hrs. ID and neurosurgery following. Na 129 and other labs noted. Met with pt and pt's sister, [facility], in room. Pt has HAO. Discussed discharge planning. PT update am-pac 13/24. OT am-pac 12/24. Both agreeable to [facility]. [facility] said pt has Medicare coverage as well as [facility] [employer]. Both agree for pt to go to [facility] under his medicare coverage. Medicare Part A + B. Policy #[id number]. I provided her with rui list for [address] [place] [address] to make choices. Pt has rui hx at [employer]. Sw/cm will follow.

## 2023-10-11 NOTE — PATIENT CARE CONFERENCE
P Quality Flow/Interdisciplinary Rounds Progress Note        Quality Flow Rounds held on October 11, 2023    Disciplines Attending:  Bedside Nurse, , , and Nursing Unit Leadership    Malka Santoyo was admitted on 10/6/2023  1:36 PM    Anticipated Discharge Date:       Disposition:    Suman Score:  Suman Scale Score: 19    Readmission Risk              Risk of Unplanned Readmission:  16           Discussed patient goal for the day, patient clinical progression, and barriers to discharge.   The following Goal(s) of the Day/Commitment(s) have been identified:  Report labs/diagnostics      Phillip Can RN  October 11, 2023

## 2023-10-11 NOTE — PROGRESS NOTES
[address]:                                                  Patient Name: Nilay Gatica    MRN: [id number]    : 1960    Room: [address] [place], [place] [id number]          Re-Evaluating OT: Mela Sands OTR/L; [id number]   Initial Evaluating OT: Marolyn Sprain OTR/L #[id number]    Reason for Re-Eval: Pt underwent L3-L5 LUMBAR LAMINECTOMY POSTERIOR, L4-L5 DISK BIOPSY resulting in change of medical & functional status - TLSO. Referring Provider: Edu Bernard MD  Specific Provider Orders/Date: OT eval and treat 10/6/23      Diagnosis: Back pain due to injury; Ambulatory dysfunction;  Epidural abscess, L2-L5. Surgery: 10/10/23 L3-L5 LUMBAR LAMINECTOMY POSTERIOR, L4-L5 DISK BIOPSY. Pertinent Medical History:  has a past medical history of Atrial fibrillation with RVR ([address]).      Recommended Adaptive Equipment: BSC, extended tub bench, AE for LE bathing and dressing PRN     Precautions:  Fall Risk, spinal precautions, TLSO, hemovac, cognition, +alarms, impulsive & limited insight     Assessment of current deficits    [x] Functional mobility  [x]ADLs  [x] Strength               [x]Cognition    [x] Functional transfers   [x] IADLs         [x] Safety Awareness   [x]Endurance    [x] Fine Coordination              [x] Balance      [] Vision/perception   []Sensation     []Gross Motor Coordination  [] ROM  [] Delirium                   [] Motor Control     OT PLAN OF CARE   OT POC based on physician orders, patient diagnosis and results of clinical assessment    Frequency/Duration 3-5 days/wk for 2 weeks PRN   Specific OT Treatment Interventions to include:   * Instruction/training on adapted ADL techniques and AE recommendations to increase functional independence within precautions       * Training on energy conservation strategies, correct breathing pattern and techniques to prevention strategies, & functional engagement throughout daily activities while implementing spinal precautions. Pt would benefit from continued skilled OT to increase safety and independence with completion of ADL/IADL tasks for functional independence and quality of life. Treatment: Pt required mod vc's for proper technique/safety with hand placement/body mechanics/posture for bed mobility/ADLs/functional tranfers/mobility/ww management. Pt required mod vc's for sequencing/initiation of ADLs/functional transfers to implement proper body mechanics & maintain safe transfer progressions. Pt able to sit EOB ~8 mins to increase core strength/balance/activity tolerance for ease with ADLs. Pt educated on spinal precautions & activity modifications/adaptations to promote implementation of fall prevention strategies, skin/joint integrity & safety awareness throughout ADLs. Pt required increased time to complete ADLs/functional transfers d/t deconditioning & limited safety/cognitive awareness. Pt appeared to have tolerated session well and appears motivated/cooperative/pleasant. Pt instructed on use of call light for assistance and fall prevention. Pt demo'ing fair- understanding of education provided. Continue to educate. Rehab Potential: Good for established goals     LTG: maximize independence with ADLs to return to PLOF    Patient and/or family were instructed on functional diagnosis, prognosis/goals and OT plan of care. Demonstrated fair- understanding. Eval Complexity: Low  History: Expanded chart review of medical records and additional review of physical, cognitive, or psychosocial history related to current functional performance  Exam: 3+ performance deficits  Assistance/Modification: Min/mod assistance or modifications required to perform tasks. May have comorbidities that affect occupational performance.     Evaluation time includes thorough review of current medical information, gathering

## 2023-10-12 LAB
ANION GAP SERPL CALCULATED.3IONS-SCNC: 11 MMOL/L (ref 7–16)
BUN SERPL-MCNC: 7 MG/DL (ref 6–23)
CALCIUM SERPL-MCNC: 8 MG/DL (ref 8.6–10.2)
CHLORIDE SERPL-SCNC: 99 MMOL/L (ref 98–107)
CO2 SERPL-SCNC: 21 MMOL/L (ref 22–29)
CREAT SERPL-MCNC: 0.6 MG/DL (ref 0.7–1.2)
DATE LAST DOSE: ABNORMAL
ERYTHROCYTE [DISTWIDTH] IN BLOOD BY AUTOMATED COUNT: 12.6 % (ref 11.5–15)
GFR SERPL CREATININE-BSD FRML MDRD: >60 ML/MIN/1.73M2
GLUCOSE BLD-MCNC: 78 MG/DL (ref 74–99)
GLUCOSE SERPL-MCNC: 98 MG/DL (ref 74–99)
HCT VFR BLD AUTO: 45.7 % (ref 37–54)
HGB BLD-MCNC: 15.7 G/DL (ref 12.5–16.5)
MCH RBC QN AUTO: 35.8 PG (ref 26–35)
MCHC RBC AUTO-ENTMCNC: 34.4 G/DL (ref 32–34.5)
MCV RBC AUTO: 104.3 FL (ref 80–99.9)
MICROORGANISM SPEC CULT: NORMAL
MICROORGANISM SPEC CULT: NORMAL
PLATELET # BLD AUTO: 296 K/UL (ref 130–450)
PMV BLD AUTO: 8.8 FL (ref 7–12)
POTASSIUM SERPL-SCNC: 3.9 MMOL/L (ref 3.5–5)
RBC # BLD AUTO: 4.38 M/UL (ref 3.8–5.8)
SODIUM SERPL-SCNC: 131 MMOL/L (ref 132–146)
SPECIMEN DESCRIPTION: NORMAL
SPECIMEN DESCRIPTION: NORMAL
TME LAST DOSE: ABNORMAL H
VANCOMYCIN DOSE: ABNORMAL MG
VANCOMYCIN TROUGH SERPL-MCNC: <4 UG/ML (ref 5–16)
WBC OTHER # BLD: 16.6 K/UL (ref 4.5–11.5)

## 2023-10-12 PROCEDURE — 85027 COMPLETE CBC AUTOMATED: CPT

## 2023-10-12 PROCEDURE — 82962 GLUCOSE BLOOD TEST: CPT

## 2023-10-12 PROCEDURE — 80048 BASIC METABOLIC PNL TOTAL CA: CPT

## 2023-10-12 PROCEDURE — 2140000000 HC CCU INTERMEDIATE R&B

## 2023-10-12 PROCEDURE — 6370000000 HC RX 637 (ALT 250 FOR IP): Performed by: NEUROLOGICAL SURGERY

## 2023-10-12 PROCEDURE — 80202 ASSAY OF VANCOMYCIN: CPT

## 2023-10-12 PROCEDURE — 97530 THERAPEUTIC ACTIVITIES: CPT

## 2023-10-12 PROCEDURE — 6360000002 HC RX W HCPCS: Performed by: NEUROLOGICAL SURGERY

## 2023-10-12 PROCEDURE — 36415 COLL VENOUS BLD VENIPUNCTURE: CPT

## 2023-10-12 PROCEDURE — 6370000000 HC RX 637 (ALT 250 FOR IP): Performed by: INTERNAL MEDICINE

## 2023-10-12 PROCEDURE — 97535 SELF CARE MNGMENT TRAINING: CPT

## 2023-10-12 PROCEDURE — 2580000003 HC RX 258: Performed by: INTERNAL MEDICINE

## 2023-10-12 PROCEDURE — 2580000003 HC RX 258: Performed by: NEUROLOGICAL SURGERY

## 2023-10-12 RX ADMIN — GABAPENTIN 300 MG: 300 CAPSULE ORAL at 21:33

## 2023-10-12 RX ADMIN — DOCUSATE SODIUM 200 MG: 100 CAPSULE, LIQUID FILLED ORAL at 21:32

## 2023-10-12 RX ADMIN — METOPROLOL TARTRATE 50 MG: 50 TABLET ORAL at 09:13

## 2023-10-12 RX ADMIN — DIAZEPAM 5 MG: 5 TABLET ORAL at 22:42

## 2023-10-12 RX ADMIN — METOPROLOL TARTRATE 50 MG: 50 TABLET ORAL at 21:33

## 2023-10-12 RX ADMIN — OXYCODONE HYDROCHLORIDE 10 MG: 10 TABLET ORAL at 06:53

## 2023-10-12 RX ADMIN — VANCOMYCIN HYDROCHLORIDE 1000 MG: 1 INJECTION, POWDER, LYOPHILIZED, FOR SOLUTION INTRAVENOUS at 09:16

## 2023-10-12 RX ADMIN — Medication 10 ML: at 09:13

## 2023-10-12 RX ADMIN — FUROSEMIDE 20 MG: 20 TABLET ORAL at 09:13

## 2023-10-12 RX ADMIN — POTASSIUM CHLORIDE 10 MEQ: 750 TABLET, EXTENDED RELEASE ORAL at 09:13

## 2023-10-12 RX ADMIN — DOCUSATE SODIUM 200 MG: 100 CAPSULE, LIQUID FILLED ORAL at 09:13

## 2023-10-12 RX ADMIN — VANCOMYCIN HYDROCHLORIDE 1000 MG: 1 INJECTION, POWDER, LYOPHILIZED, FOR SOLUTION INTRAVENOUS at 21:35

## 2023-10-12 RX ADMIN — Medication 10 ML: at 21:33

## 2023-10-12 RX ADMIN — FOLIC ACID 500 MCG: 1 TABLET ORAL at 09:13

## 2023-10-12 RX ADMIN — GABAPENTIN 300 MG: 300 CAPSULE ORAL at 09:13

## 2023-10-12 RX ADMIN — OXYCODONE HYDROCHLORIDE 10 MG: 10 TABLET ORAL at 21:33

## 2023-10-12 ASSESSMENT — PAIN DESCRIPTION - ORIENTATION
ORIENTATION: MID
ORIENTATION: MID

## 2023-10-12 ASSESSMENT — PAIN DESCRIPTION - DESCRIPTORS
DESCRIPTORS: ACHING;DISCOMFORT;JABBING
DESCRIPTORS: ACHING;SORE;TENDER

## 2023-10-12 ASSESSMENT — PAIN SCALES - GENERAL
PAINLEVEL_OUTOF10: 2
PAINLEVEL_OUTOF10: 0
PAINLEVEL_OUTOF10: 10
PAINLEVEL_OUTOF10: 10
PAINLEVEL_OUTOF10: 7

## 2023-10-12 ASSESSMENT — PAIN SCALES - WONG BAKER: WONGBAKER_NUMERICALRESPONSE: 0

## 2023-10-12 ASSESSMENT — PAIN - FUNCTIONAL ASSESSMENT
PAIN_FUNCTIONAL_ASSESSMENT: PREVENTS OR INTERFERES SOME ACTIVE ACTIVITIES AND ADLS
PAIN_FUNCTIONAL_ASSESSMENT: PREVENTS OR INTERFERES SOME ACTIVE ACTIVITIES AND ADLS

## 2023-10-12 ASSESSMENT — PAIN DESCRIPTION - LOCATION
LOCATION: BACK
LOCATION: BACK

## 2023-10-12 NOTE — CARE COORDINATION
Transition of care update: L3-5 laminectomy on 10/10. Drain to be d/c'd later today per NS. IV vanco 1000mg q 12 hrs. Met with pt in room earlier today. Has HAO. PT/OT worked with pt. Called pt's sister, Keena Silvana, [phone]. Went over [facility] choices #1 [id number] [facility] - referral was made to Keena Pina, #2 [facility] -referral was made to Lucrecia Alcala. Pt to go to [facility] under his medicare coverage. Sw/cm will follow.

## 2023-10-12 NOTE — PROGRESS NOTES
Physical Therapy  Physical Therapy Treatment     Name: Max Holbrook  : 1960  MRN: [id number]      Date of Service: 10/12/2023    Evaluating/R-Evaluating PT:  Riki Curry, PT, DPT, [id number]    Room #:  6928/2224-U  Diagnosis:  Hyponatremia [E87.1]  Ambulatory dysfunction [R26.2]  Back pain due to injury [M54.9]  Chronic low back pain, unspecified back pain laterality, unspecified whether sciatica present [M54.50, G89.29]  PMHx/PSHx:    Past Medical History:   Diagnosis Date    Atrial fibrillation with RVR ([address])       Past Surgical History:   Procedure Laterality Date    HIP FRACTURE SURGERY Right     Rt Hip. Operated on 4 times. KNEE SURGERY Left     LAMINECTOMY Bilateral 10/10/2023    L3-L5 LUMBAR LAMINECTOMY POSTERIOR, L4-L5 DISK BIOPSY performed by Denton Perez MD at [facility] Right       Procedure/Surgery:  L3-L5 LUMBAR LAMINECTOMY POSTERIOR, L4-L5 DISK BIOPSY 10/10/23  Precautions:  fall risk, spinal, cognition, poor safety awareness, TLSO  Equipment Needs:  TBD    SUBJECTIVE:    Pt lives alone in a 1 story home with 3 stairs to enter and 2 rail. Bed is on 1 floor and bath is on 1 floor. Pt ambulated with no AD PTA. OBJECTIVE:   Initial Evaluation  Date: 10/7/23 Re-Evaluation Date: 10/11/23 Treatment Date: 10/12/23 Short Term/ Long Term   Goals   AM-PAC 6 Clicks 86/99 89/01 17/06    Was pt agreeable to Eval/treatment? yes yes yes    Does pt have pain? 10/10 back pain 8.5/10 back pain Back pain with mobility     Bed Mobility  Rolling: NT  Supine to sit: NT  Sit to supine: NT  Scooting: NT Rolling: ModA  Supine to sit: ModA  Sit to supine: NT  Scooting: Orly Rolling: ModA  Supine to sit: MaxA  Sit to supine: NT  Scooting: Orly Rolling: Independent   Supine to sit:  Independent   Sit to supine: Independent   Scooting: Independent    Transfers Sit to stand: ModA  Stand to sit: Orly  Stand pivot: Orly with [employer] Sit to stand: MaxA  Stand to sit: MaxA  Stand pivot: Orly with [employer] Sit to reinforce hand placement. Positioned in bedside chair and setup with breakfast tray. Limited this session d/t increased pain with mobility. All needs were met and call light in reach at conclusion of session. RN notified. Treatment:  Patient practiced and was instructed in the following treatment:    Bed mobility training - pt given verbal and tactile cues to facilitate proper sequencing and safety during rolling and supine>sit as well as provided with physical assistance to complete task   Sitting EOB for >8 minutes for upright tolerance, postural awareness and BLE ROM  Transfer training - pt was given verbal and tactile cues to facilitate proper hand placement, technique and safety during sit to stand and stand to sit as well as provided with physical assistance. Gait training- pt was given verbal and tactile cues to facilitate balance, [employer] management, and safety during ambulation as well as provided with physical assistance. Skilled positioning - Pt placed in the chair with pillows utilized to facilitate upright posture, joint and skin integrity, and interaction with environment. PLAN:    Patient is making slow progress towards established goals. Will continue with current POC.       Time in  0825  Time out  0852    Total Treatment Time  27 minutes     CPT codes:  [] Gait training 60690 0 minutes  [] Manual therapy 34380 0 minutes  [x] Therapeutic activities 35670 27 minutes  [] Therapeutic exercises 08407 0 minutes  [] Neuromuscular reeducation [address] 0 minutes    Cinda Mehta PT, DPT  [id number]

## 2023-10-12 NOTE — PROGRESS NOTES
precautions Good  and wfl FMC/dexterity noted during ADL tasks         [facility]  Sensation: No c/o numbness or tingling  Tone: WFL  Edema: Unremarkable    Comments: Upon arrival pt supine in bed. ADL retraining to increase safety and indep in dressing and grooming tasks, balance and trf training to increase participation in functional mobility and standing aspects of ADLs with increased safety. Pt was educated through out treatment regarding proper technique & safety with bed mobility, importance of wearing TLSO brace to allow pt to be upright in bed & improve positioning/posture, prevent further skin breakdown, maintain spinal precautions & completion of simple ADL tasks with modified techniques to improve safety & prevent falls. Pt would benefit from continued skilled OT to increase safety and independence with completion of ADL/IADL tasks for functional independence and quality of life. At end of session pt left seated in bedside chair, call light within reach, chair alarm on. Pt has made fair- progress towards set goals.      Continue with current plan of care    Treatment Time [id number]            Treatment Time Out: 0117             Treatment Charges: Mins Units   Ther Ex  07301     Manual Therapy 60672     Thera Activities 67152 15 1   ADL/Home Mgt 71097 15 1   Neuro Re-ed 23342     Group Therapy      Orthotic manage/training  25226     Non-Billable Time     Total Timed Treatment 30 [address] MCCLAIN/RICHI [address]

## 2023-10-12 NOTE — PROGRESS NOTES
Department of Internal Medicine  Infectious Diseases  Progress  Note      C/C : Discitis , Staph epi bacteremia     Reports back pain improving   Denies fever     Afebrile        Current Facility-Administered Medications   Medication Dose Route Frequency Provider Last Rate Last Admin    oxyCODONE (ROXICODONE) immediate release tablet 5 mg  5 mg Oral Q4H PRN Lawence Pallas, MD        Or    oxyCODONE HCl (OXY-IR) immediate release tablet 10 mg  10 mg Oral Q4H PRN Lawence Pallas, MD   10 mg at 10/12/23 0653    HYDROmorphone (DILAUDID) injection 0.5 mg  0.5 mg IntraVENous Q3H PRN Lawence Pallas, MD        diazePAM (VALIUM) tablet 5 mg  5 mg Oral Q6H PRN Lawence Pallas, MD   5 mg at 10/11/23 2206    docusate sodium (COLACE) capsule 200 mg  200 mg Oral BID Lawence Pallas, MD   200 mg at 10/12/23 0913    vancomycin (VANCOCIN) 1,000 mg in sodium chloride 0.9 % 250 mL IVPB (Pzma4Bly)  1,000 mg IntraVENous Q12H Lawence Pallas, MD   Stopped at 10/12/23 1110    furosemide (LASIX) tablet 20 mg  20 mg Oral Daily Lansing Epley, MD   20 mg at 10/12/23 0913    potassium chloride (KLOR-CON M) extended release tablet 10 mEq  10 mEq Oral Daily Lansing Epley, MD   10 mEq at 08/48/86 8580    folic acid (FOLVITE) tablet 500 mcg  500 mcg Oral Daily Vielka Dominique MD   500 mcg at 10/12/23 0913    gabapentin (NEURONTIN) capsule 300 mg  300 mg Oral BID Vielka Dominique MD   300 mg at 10/12/23 0913    metoprolol tartrate (LOPRESSOR) tablet 50 mg  50 mg Oral BID Vielka Dominique MD   50 mg at 10/12/23 0913    sodium chloride flush 0.9 % injection 5-40 mL  5-40 mL IntraVENous 2 times per day Vielka Dominique MD   10 mL at 10/12/23 0913    sodium chloride flush 0.9 % injection 5-40 mL  5-40 mL IntraVENous PRN Vielka Dominique MD        0.9 % sodium chloride infusion   IntraVENous PRN Vielka Dominique MD        ondansetron (ZOFRAN-ODT) disintegrating tablet 4 mg  4 mg Oral Q8H PRN Vielka Dominique MD        Or    ondansetron [facility] injection

## 2023-10-12 NOTE — PATIENT CARE CONFERENCE
P Quality Flow/Interdisciplinary Rounds Progress Note        Quality Flow Rounds held on October 12, 2023    Disciplines Attending:  Bedside Nurse, , , and Nursing Unit Leadership    Jory Castro was admitted on 10/6/2023  1:36 PM    Anticipated Discharge Date:       Disposition:    Suman Score:  Suman Scale Score: 19    Readmission Risk              Risk of Unplanned Readmission:  16           Discussed patient goal for the day, patient clinical progression, and barriers to discharge.   The following Goal(s) of the Day/Commitment(s) have been identified:  discharge 1941 Ciara Sandhu RN  October 12, 2023 0

## 2023-10-13 LAB
ACB COMPLEX DNA BLD POS QL NAA+NON-PROBE: NOT DETECTED
ANION GAP SERPL CALCULATED.3IONS-SCNC: 8 MMOL/L (ref 7–16)
B FRAGILIS DNA BLD POS QL NAA+NON-PROBE: NOT DETECTED
BIOFIRE TEST COMMENT: ABNORMAL
BUN SERPL-MCNC: 7 MG/DL (ref 6–23)
C ALBICANS DNA BLD POS QL NAA+NON-PROBE: NOT DETECTED
C AURIS DNA BLD POS QL NAA+NON-PROBE: NOT DETECTED
C GATTII+NEOFOR DNA BLD POS QL NAA+N-PRB: NOT DETECTED
C GLABRATA DNA BLD POS QL NAA+NON-PROBE: NOT DETECTED
C KRUSEI DNA BLD POS QL NAA+NON-PROBE: NOT DETECTED
C PARAP DNA BLD POS QL NAA+NON-PROBE: NOT DETECTED
C TROPICLS DNA BLD POS QL NAA+NON-PROBE: NOT DETECTED
CALCIUM SERPL-MCNC: 7.5 MG/DL (ref 8.6–10.2)
CHLORIDE SERPL-SCNC: 98 MMOL/L (ref 98–107)
CO2 SERPL-SCNC: 24 MMOL/L (ref 22–29)
CREAT SERPL-MCNC: 0.5 MG/DL (ref 0.7–1.2)
E CLOAC COMP DNA BLD POS NAA+NON-PROBE: NOT DETECTED
E COLI DNA BLD POS QL NAA+NON-PROBE: NOT DETECTED
E FAECALIS DNA BLD POS QL NAA+NON-PROBE: NOT DETECTED
E FAECIUM DNA BLD POS QL NAA+NON-PROBE: NOT DETECTED
ENTEROBACTERALES DNA BLD POS NAA+N-PRB: NOT DETECTED
ERYTHROCYTE [DISTWIDTH] IN BLOOD BY AUTOMATED COUNT: 12.7 % (ref 11.5–15)
GFR SERPL CREATININE-BSD FRML MDRD: >60 ML/MIN/1.73M2
GLUCOSE SERPL-MCNC: 133 MG/DL (ref 74–99)
GP B STREP DNA BLD POS QL NAA+NON-PROBE: NOT DETECTED
HAEM INFLU DNA BLD POS QL NAA+NON-PROBE: NOT DETECTED
HCT VFR BLD AUTO: 40.3 % (ref 37–54)
HGB BLD-MCNC: 13.9 G/DL (ref 12.5–16.5)
K OXYTOCA DNA BLD POS QL NAA+NON-PROBE: NOT DETECTED
KLEBSIELLA SP DNA BLD POS QL NAA+NON-PRB: NOT DETECTED
KLEBSIELLA SP DNA BLD POS QL NAA+NON-PRB: NOT DETECTED
L MONOCYTOG DNA BLD POS QL NAA+NON-PROBE: NOT DETECTED
MCH RBC QN AUTO: 36.1 PG (ref 26–35)
MCHC RBC AUTO-ENTMCNC: 34.5 G/DL (ref 32–34.5)
MCV RBC AUTO: 104.7 FL (ref 80–99.9)
MECA+MECC ISLT/SPM QL: NOT DETECTED
MICROORGANISM SPEC CULT: ABNORMAL
MICROORGANISM/AGENT SPEC: ABNORMAL
N MEN DNA BLD POS QL NAA+NON-PROBE: NOT DETECTED
P AERUGINOSA DNA BLD POS NAA+NON-PROBE: NOT DETECTED
PLATELET # BLD AUTO: 247 K/UL (ref 130–450)
PMV BLD AUTO: 10.1 FL (ref 7–12)
POTASSIUM SERPL-SCNC: 3.6 MMOL/L (ref 3.5–5)
PROTEUS SP DNA BLD POS QL NAA+NON-PROBE: NOT DETECTED
RBC # BLD AUTO: 3.85 M/UL (ref 3.8–5.8)
S AUREUS DNA BLD POS QL NAA+NON-PROBE: NOT DETECTED
S AUREUS+CONS DNA BLD POS NAA+NON-PROBE: DETECTED
S EPIDERMIDIS DNA BLD POS QL NAA+NON-PRB: DETECTED
S LUGDUNENSIS DNA BLD POS QL NAA+NON-PRB: NOT DETECTED
S MALTOPHILIA DNA BLD POS QL NAA+NON-PRB: NOT DETECTED
S MARCESCENS DNA BLD POS NAA+NON-PROBE: NOT DETECTED
S PNEUM DNA BLD POS QL NAA+NON-PROBE: NOT DETECTED
S PYO DNA BLD POS QL NAA+NON-PROBE: NOT DETECTED
SALMONELLA DNA BLD POS QL NAA+NON-PROBE: NOT DETECTED
SARS-COV-2 RDRP RESP QL NAA+PROBE: NOT DETECTED
SERVICE CMNT-IMP: ABNORMAL
SODIUM SERPL-SCNC: 130 MMOL/L (ref 132–146)
SPECIMEN DESCRIPTION: ABNORMAL
SPECIMEN DESCRIPTION: NORMAL
STREPTOCOCCUS DNA BLD POS NAA+NON-PROBE: NOT DETECTED
WBC OTHER # BLD: 15.6 K/UL (ref 4.5–11.5)

## 2023-10-13 PROCEDURE — 6370000000 HC RX 637 (ALT 250 FOR IP): Performed by: NEUROLOGICAL SURGERY

## 2023-10-13 PROCEDURE — 87635 SARS-COV-2 COVID-19 AMP PRB: CPT

## 2023-10-13 PROCEDURE — 85027 COMPLETE CBC AUTOMATED: CPT

## 2023-10-13 PROCEDURE — 36415 COLL VENOUS BLD VENIPUNCTURE: CPT

## 2023-10-13 PROCEDURE — 97530 THERAPEUTIC ACTIVITIES: CPT

## 2023-10-13 PROCEDURE — 6370000000 HC RX 637 (ALT 250 FOR IP): Performed by: INTERNAL MEDICINE

## 2023-10-13 PROCEDURE — 6360000002 HC RX W HCPCS: Performed by: INTERNAL MEDICINE

## 2023-10-13 PROCEDURE — 1200000000 HC SEMI PRIVATE

## 2023-10-13 PROCEDURE — 2580000003 HC RX 258: Performed by: INTERNAL MEDICINE

## 2023-10-13 PROCEDURE — 80048 BASIC METABOLIC PNL TOTAL CA: CPT

## 2023-10-13 RX ORDER — LIDOCAINE HYDROCHLORIDE 10 MG/ML
5 INJECTION, SOLUTION EPIDURAL; INFILTRATION; INTRACAUDAL; PERINEURAL ONCE
Status: DISCONTINUED | OUTPATIENT
Start: 2023-10-13 | End: 2023-10-14 | Stop reason: HOSPADM

## 2023-10-13 RX ORDER — OXYCODONE HYDROCHLORIDE 5 MG/1
5 TABLET ORAL EVERY 4 HOURS PRN
Qty: 18 TABLET | Refills: 0 | Status: SHIPPED | OUTPATIENT
Start: 2023-10-13 | End: 2023-10-16

## 2023-10-13 RX ORDER — SODIUM CHLORIDE 0.9 % (FLUSH) 0.9 %
5-40 SYRINGE (ML) INJECTION PRN
Status: DISCONTINUED | OUTPATIENT
Start: 2023-10-13 | End: 2023-10-14 | Stop reason: HOSPADM

## 2023-10-13 RX ORDER — SODIUM CHLORIDE 9 MG/ML
INJECTION, SOLUTION INTRAVENOUS PRN
Status: DISCONTINUED | OUTPATIENT
Start: 2023-10-13 | End: 2023-10-14 | Stop reason: HOSPADM

## 2023-10-13 RX ORDER — SENNOSIDES A AND B 8.6 MG/1
1 TABLET, FILM COATED ORAL DAILY PRN
DISCHARGE
Start: 2023-10-13 | End: 2023-11-12

## 2023-10-13 RX ORDER — HEPARIN 100 UNIT/ML
3 SYRINGE INTRAVENOUS PRN
Status: DISCONTINUED | OUTPATIENT
Start: 2023-10-13 | End: 2023-10-14 | Stop reason: HOSPADM

## 2023-10-13 RX ORDER — PSEUDOEPHEDRINE HCL 30 MG
200 TABLET ORAL 2 TIMES DAILY
DISCHARGE
Start: 2023-10-13

## 2023-10-13 RX ORDER — SODIUM CHLORIDE 0.9 % (FLUSH) 0.9 %
5-40 SYRINGE (ML) INJECTION EVERY 12 HOURS SCHEDULED
Status: DISCONTINUED | OUTPATIENT
Start: 2023-10-13 | End: 2023-10-14 | Stop reason: HOSPADM

## 2023-10-13 RX ORDER — HEPARIN 100 UNIT/ML
3 SYRINGE INTRAVENOUS EVERY 12 HOURS SCHEDULED
Status: DISCONTINUED | OUTPATIENT
Start: 2023-10-13 | End: 2023-10-14 | Stop reason: HOSPADM

## 2023-10-13 RX ADMIN — METOPROLOL TARTRATE 50 MG: 50 TABLET ORAL at 09:59

## 2023-10-13 RX ADMIN — FUROSEMIDE 20 MG: 20 TABLET ORAL at 10:00

## 2023-10-13 RX ADMIN — VANCOMYCIN HYDROCHLORIDE 1500 MG: 10 INJECTION, POWDER, LYOPHILIZED, FOR SOLUTION INTRAVENOUS at 11:59

## 2023-10-13 RX ADMIN — METOPROLOL TARTRATE 50 MG: 50 TABLET ORAL at 22:00

## 2023-10-13 RX ADMIN — FOLIC ACID 500 MCG: 1 TABLET ORAL at 09:59

## 2023-10-13 RX ADMIN — OXYCODONE HYDROCHLORIDE 10 MG: 10 TABLET ORAL at 09:58

## 2023-10-13 RX ADMIN — Medication 10 ML: at 10:00

## 2023-10-13 RX ADMIN — SODIUM CHLORIDE, PRESERVATIVE FREE 10 ML: 5 INJECTION INTRAVENOUS at 19:13

## 2023-10-13 RX ADMIN — POTASSIUM CHLORIDE 10 MEQ: 750 TABLET, EXTENDED RELEASE ORAL at 09:59

## 2023-10-13 RX ADMIN — DOCUSATE SODIUM 200 MG: 100 CAPSULE, LIQUID FILLED ORAL at 09:59

## 2023-10-13 RX ADMIN — GABAPENTIN 300 MG: 300 CAPSULE ORAL at 09:57

## 2023-10-13 RX ADMIN — DOCUSATE SODIUM 200 MG: 100 CAPSULE, LIQUID FILLED ORAL at 22:00

## 2023-10-13 RX ADMIN — OXYCODONE HYDROCHLORIDE 10 MG: 10 TABLET ORAL at 19:09

## 2023-10-13 RX ADMIN — SENNOSIDES 8.6 MG: 8.6 TABLET, FILM COATED ORAL at 19:10

## 2023-10-13 RX ADMIN — SODIUM CHLORIDE, PRESERVATIVE FREE 10 ML: 5 INJECTION INTRAVENOUS at 22:00

## 2023-10-13 RX ADMIN — GABAPENTIN 300 MG: 300 CAPSULE ORAL at 22:00

## 2023-10-13 ASSESSMENT — PAIN DESCRIPTION - ONSET: ONSET: ON-GOING

## 2023-10-13 ASSESSMENT — PAIN DESCRIPTION - LOCATION
LOCATION: BACK
LOCATION: BACK

## 2023-10-13 ASSESSMENT — PAIN SCALES - GENERAL
PAINLEVEL_OUTOF10: 2
PAINLEVEL_OUTOF10: 7
PAINLEVEL_OUTOF10: 10
PAINLEVEL_OUTOF10: 0
PAINLEVEL_OUTOF10: 2

## 2023-10-13 ASSESSMENT — PAIN DESCRIPTION - ORIENTATION
ORIENTATION: MID
ORIENTATION: LOWER

## 2023-10-13 ASSESSMENT — PAIN SCALES - WONG BAKER: WONGBAKER_NUMERICALRESPONSE: 0

## 2023-10-13 ASSESSMENT — PAIN DESCRIPTION - DESCRIPTORS
DESCRIPTORS: ACHING
DESCRIPTORS: ACHING;DISCOMFORT;THROBBING

## 2023-10-13 ASSESSMENT — PAIN DESCRIPTION - FREQUENCY: FREQUENCY: INTERMITTENT

## 2023-10-13 ASSESSMENT — PAIN DESCRIPTION - PAIN TYPE: TYPE: ACUTE PAIN;SURGICAL PAIN

## 2023-10-13 NOTE — PROGRESS NOTES
Department of Internal Medicine  Infectious Diseases  Progress  Note      C/C : Discitis , Staph epi bacteremia     Reports back pain improving   Denies fever     Afebrile        Current Facility-Administered Medications   Medication Dose Route Frequency Provider Last Rate Last Admin    vancomycin (VANCOCIN) 1500 mg in sodium chloride 0.9 % 250 mL IVPB  1,500 mg IntraVENous Q12H Limbu, Kun GARCIA MD        lidocaine PF 1 % injection 5 mL  5 mL IntraDERmal Once Limbu, Kun GARCIA MD        sodium chloride flush 0.9 % injection 5-40 mL  5-40 mL IntraVENous 2 times per day Limbu, Kun GARCIA MD        sodium chloride flush 0.9 % injection 5-40 mL  5-40 mL IntraVENous PRN Limbu, Kun GARCIA MD        0.9 % sodium chloride infusion   IntraVENous PRN Limbu, Kun GARCIA MD        heparin (PF) 100 UNIT/ML injection 300 Units  3 mL IntraVENous 2 times per day Limbu, Kun GARCIA MD        heparin (PF) 100 UNIT/ML injection 300 Units  3 mL IntraVENous PRN Limbu, Kun GARCIA MD        oxyCODONE (ROXICODONE) immediate release tablet 5 mg  5 mg Oral Q4H PRN Jeramy Mayorga MD        Or    oxyCODONE HCl (OXY-IR) immediate release tablet 10 mg  10 mg Oral Q4H PRN Jeramy Mayorga MD   10 mg at 10/13/23 0958    HYDROmorphone (DILAUDID) injection 0.5 mg  0.5 mg IntraVENous Q3H PRN Jeramy Mayorga MD        diazePAM (VALIUM) tablet 5 mg  5 mg Oral Q6H PRN Jeramy Mayorga MD   5 mg at 10/12/23 2242    docusate sodium (COLACE) capsule 200 mg  200 mg Oral BID Jeramy Mayorga MD   200 mg at 10/13/23 0959    furosemide (LASIX) tablet 20 mg  20 mg Oral Daily Cheryl Bryant MD   20 mg at 10/13/23 1000    potassium chloride (KLOR-CON M) extended release tablet 10 mEq  10 mEq Oral Daily Cheryl Bryant MD   10 mEq at 03/83/09 7944    folic acid (FOLVITE) tablet 500 mcg  500 mcg Oral Daily Kaycee Kiran MD   500 mcg at 10/13/23 0959    gabapentin (NEURONTIN) capsule 300 mg  300 mg Oral BID Kaycee Kiran MD   300 mg at 10/13/23 0957    metoprolol tartrate

## 2023-10-13 NOTE — PROGRESS NOTES
[facility] Quality Flow/Interdisciplinary Rounds Progress Note        Quality Flow Rounds held on October 13, 2023    Disciplines Attending:  Bedside Nurse, , , and Nursing Unit Leadership    Javier Rodriguez was admitted on 10/6/2023  1:36 PM    Anticipated Discharge Date:       Disposition:    Suman Score:  Suman Scale Score: 19    Readmission Risk              Risk of Unplanned Readmission:  16           Discussed patient goal for the day, patient clinical progression, and barriers to discharge.   The following Goal(s) of the Day/Commitment(s) have been identified:  Diagnostics - Report Results and Labs - Report Results      Jose Luis Garvin RN  October 13, 2023

## 2023-10-13 NOTE — PROGRESS NOTES
RN messaged Dr. Scott Lucero via [address] [place], re: Sister of pt is asking for a doctor to speak with her, because she would like to know exactly what is going on with him before he goes to [place].  Phone [phone]

## 2023-10-13 NOTE — CARE COORDINATION
Transition of care update: Lumbar 3-5 laminectomy on 10/10. Iv vanco 1500mg q 12 hrs. Spoke with ID and pt will need vanco for 6 weeks. Picc line ordered. Per [address], pt's traditional medicare coverage is not active until 12/01/23. Pt went to [facility] last admission under a \"medicare adv plan. \" Spoke with Leigh Ann with [facility] and she is checking with their business office to see which medicare adv plan they received auth from. Pt's sister and brother in law was unsure of which medicare adv plan pt has. 1130  Waiting to hear back from Willam with [facility]. Met with pt in room and he does not know who his managed medicare provider is. [address] with Leigh Ann with [facility] pt does not have a managed medicare plan and pt's medicare is not active until 12/01/23. Cici Marquez at [facility] EZ#[phone]  ext [phone]. Confirmed with Yolanda Chávez pt is nonectomy  service connected and can not go to a rehab facility under his VA benefit. Yolanda Chávez said they have no other insurance listed for pt. Pt will have to go to [facility]. Yolanda Chávez is aware pt is ready for dc and will need pt/ot as well as iv vanco for 6 weeks. She is checking with their Travel Dept to see about travel benefits. If not, pt will have to pay for his own transportation. Clinicals were faxed to #[phone]. Ambulance form is filled out. 1250  Per Yolanda Chávez with [facility] they have no beds available today. She is still working on travel coverage. Called pt's sister,Qian, and notified her of above. #[phone].     [address] [facility] with Yolanda Chávez with [facility]. She was able to get ambulance transportation covered by the [place]. She has tentatively set pt up for 11am ambulance  with [facility]. Yolanda Chávez said the [place] will touch base with 5400 unit in the am. Yolanda Chávez was notified of pt's new room 5420A. Yolanda Chávez said they will discontinue the ambulance if they do not have a bed available tomorrow.  DC envelope was placed

## 2023-10-13 NOTE — PROGRESS NOTES
RN left message with answering service for Dr. Louie Mehta stating that morning dose of vanc cannot be given d/t future dc order being put in before med could be given. Requested one time dose so pt can get morning vanc if that's what Dr. Louie Mehta wants. RN sent message to IV team stating that pt needs PICC line placed.

## 2023-10-13 NOTE — PROGRESS NOTES
Occupational Therapy  OT BEDSIDE TREATMENT NOTE    [employer] [address] [place] [address]   [address] [place] [address]      IQAD:  Patient Name: Malka Santoyo  MRN: [id number]  : 1960  Room: [address] [place] [address]     Re-Evaluating OT: Fer Garciaselene OTR/L; [id number]   Initial Evaluating OT: Christopheroli Henson OTR/L #[id number]     Reason for Re-Eval: Pt underwent L3-L5 LUMBAR LAMINECTOMY POSTERIOR, L4-L5 DISK BIOPSY resulting in change of medical & functional status - TLSO. Referring Provider: Doug Tidwell MD  Specific Provider Orders/Date: OT eval and treat 10/6/23       Diagnosis: Back pain due to injury; Ambulatory dysfunction;  Epidural abscess, L2-L5. Surgery: 10/10/23 L3-L5 LUMBAR LAMINECTOMY POSTERIOR, L4-L5 DISK BIOPSY. Pertinent Medical History:  has a past medical history of Atrial fibrillation with RVR ([address]).       Recommended Adaptive Equipment: BSC, extended tub bench, AE for LE bathing and dressing PRN      Precautions:  Fall Risk, spinal precautions, TLSO, hemovac, cognition, +alarms, impulsive & limited insight      Assessment of current deficits    [x] Functional mobility            [x]ADLs           [x] Strength                  [x]Cognition    [x] Functional transfers          [x] IADLs         [x] Safety Awareness   [x]Endurance    [x] Fine Coordination                         [x] Balance      [] Vision/perception   []Sensation      []Gross Motor Coordination             [] ROM           [] Delirium                   [] Motor Control      OT PLAN OF CARE   OT POC based on physician orders, patient diagnosis and results of clinical assessment     Frequency/Duration 3-5 days/wk for 2 weeks PRN   Specific OT Treatment Interventions to include:   * Instruction/training on adapted ADL techniques and AE recommendations to increase functional independence within precautions       * Training on energy conservation strategies, correct breathing pattern and Unremarkable    Comments: Upon arrival pt sitting in chair. ADL retraining to increase safety and indep in dressing tasks, balance and trf training to increase participation in functional mobility and standing aspects of ADLs with increased safety. Pt was educated through out treatment regarding proper technique & safety with bed mobility, importance of wearing TLSO brace to allow pt to be upright in bed & improve positioning/posture, prevent further skin breakdown, maintain spinal precautions & completion of simple ADL tasks with modified techniques to improve safety & prevent falls. Pt demo's variable carryover due to cognition. Pt would benefit from continued skilled OT to increase safety and independence with completion of ADL/IADL tasks for functional independence and quality of life. At end of session pt semi supine in bed, call light within reach, bed alarm on. Pt has made fair- progress towards set goals.      Continue with current plan of care    Treatment Time In:1330           Treatment Time Out: 5663            Treatment Charges: Mins Units   Ther Ex  69704     Manual Therapy 79005     Thera Activities 63689 15 1   ADL/Home Mgt 87785     Neuro Re-ed 97715     Group Therapy      Orthotic manage/training  16381     Non-Billable Time     15 15 1     [facility] MCCLAIN/L 61468

## 2023-10-13 NOTE — PROGRESS NOTES
Comprehensive Nutrition Assessment    Type and Reason for Visit:  Initial (LOS)    Nutrition Recommendations/Plan:   Recommend and start Ensure high protein supplement BID and Osmany wound healing supplement BID to help meet increased nutritional needs from surgical wound healing. Malnutrition Assessment:  Malnutrition Status: At risk for malnutrition (Comment) (10/13/23 1347)    Context:  Acute Illness     Findings of the 6 clinical characteristics of malnutrition:  Energy Intake:  75% or less of estimated energy requirements for 7 or more days  Weight Loss:  Unable to assess (d/t possible fluid shifts)     Body Fat Loss:  Unable to assess     Muscle Mass Loss:  Unable to assess    Fluid Accumulation:  No significant fluid accumulation     Strength:  Not Performed    Nutrition Assessment:    Patient adm w/ back pain ; noted recent back injury w/ fractures ; noted discitis and bacteremia ; s/p laminectomy on 10/10 ; hx of CAD ; will start ONS    Nutrition Related Findings:    -I&Os (-6.9 L), 1+ edema, forgetful, missing teeth, active BS, rounded abd, abrasiobns, redness to heels, hyponatremia ; Wound Type: Surgical Incision (Incision x 1)       Current Nutrition Intake & Therapies:    Average Meal Intake: 51-75%     ADULT DIET; Regular; Low Fat/Low Chol/High Fiber/2 gm Na    Anthropometric Measures:  Height: 6' 3\" (190.5 cm)  Ideal Body Weight (IBW): 196 lbs (89 kg)    Admission Body Weight: 213 lb (96.6 kg) (10/7, bedscale)  Current Body Weight: 200 lb (90.7 kg) (10/13, bedscale), 102 % IBW. Weight Source: Bed Scale  Current BMI (kg/m2): 25  Usual Body Weight:  (UTO ; EMR shows past weight of 230# actual on 9/15/23)                       BMI Categories: Overweight (BMI 25.0-29. 9)    Estimated Daily Nutrient Needs:  Energy Requirements Based On: Formula  Weight Used for Energy Requirements: Current  Energy (kcal/day): 6218-8803 (REE 1785 x 1.2 SF)  Weight Used for Protein Requirements: Ideal  Protein

## 2023-10-13 NOTE — PROGRESS NOTES
Physical Therapy  Physical Therapy Treatment     Name: Amarilis Warren  : 1960  MRN: [id number]      Date of Service: 10/13/2023    Evaluating/R-Evaluating PT:  Yoko Ferro PT, DPT, [id number]    Room #:  4673/5941-D  Diagnosis:  Hyponatremia [E87.1]  Ambulatory dysfunction [R26.2]  Back pain due to injury [M54.9]  Chronic low back pain, unspecified back pain laterality, unspecified whether sciatica present [M54.50, G89.29]  PMHx/PSHx:    Past Medical History:   Diagnosis Date    Atrial fibrillation with RVR ([address])       Past Surgical History:   Procedure Laterality Date    HIP FRACTURE SURGERY Right     Rt Hip. Operated on 4 times. KNEE SURGERY Left     LAMINECTOMY Bilateral 10/10/2023    L3-L5 LUMBAR LAMINECTOMY POSTERIOR, L4-L5 DISK BIOPSY performed by Benson Mendez MD at [facility] Right       Procedure/Surgery:  L3-L5 LUMBAR LAMINECTOMY POSTERIOR, L4-L5 DISK BIOPSY 10/10/23  Precautions:  fall risk, spinal, cognition, poor safety awareness, TLSO, TSM  Equipment Needs:  TBD    SUBJECTIVE:    Pt lives alone in a 1 story home with 3 stairs to enter and 2 rail. Bed is on 1 floor and bath is on 1 floor. Pt ambulated with no AD PTA. OBJECTIVE:   Initial Evaluation  Date: 10/7/23 Re-Evaluation Date: 10/11/23 Treatment Date: 10/13/23 Short Term/ Long Term   Goals   AM-PAC 6 Clicks  89/ 69/78    Was pt agreeable to Eval/treatment? yes yes yes    Does pt have pain? 10/10 back pain 8.5/10 back pain Back pain with mobility     Bed Mobility  Rolling: NT  Supine to sit: NT  Sit to supine: NT  Scooting: NT Rolling: ModA  Supine to sit: ModA  Sit to supine: NT  Scooting: Orly Rolling: ModA  Supine to sit: MaxA  Sit to supine: NT  Scooting: Orly Rolling: Independent   Supine to sit:  Independent   Sit to supine: Independent   Scooting: Independent    Transfers Sit to stand: ModA  Stand to sit: Orly  Stand pivot: Orly with [employer] Sit to stand: MaxA  Stand to sit: MaxA  Stand pivot: Orly with [employer]

## 2023-10-14 ENCOUNTER — APPOINTMENT (OUTPATIENT)
Dept: GENERAL RADIOLOGY | Age: 63
End: 2023-10-14
Payer: OTHER GOVERNMENT

## 2023-10-14 VITALS
HEIGHT: 75 IN | SYSTOLIC BLOOD PRESSURE: 126 MMHG | OXYGEN SATURATION: 95 % | DIASTOLIC BLOOD PRESSURE: 83 MMHG | TEMPERATURE: 97.5 F | RESPIRATION RATE: 17 BRPM | BODY MASS INDEX: 25.22 KG/M2 | WEIGHT: 202.8 LBS | HEART RATE: 96 BPM

## 2023-10-14 LAB
ANION GAP SERPL CALCULATED.3IONS-SCNC: 6 MMOL/L (ref 7–16)
BUN SERPL-MCNC: 9 MG/DL (ref 6–23)
CALCIUM SERPL-MCNC: 8 MG/DL (ref 8.6–10.2)
CHLORIDE SERPL-SCNC: 98 MMOL/L (ref 98–107)
CO2 SERPL-SCNC: 22 MMOL/L (ref 22–29)
CREAT SERPL-MCNC: 0.5 MG/DL (ref 0.7–1.2)
ERYTHROCYTE [DISTWIDTH] IN BLOOD BY AUTOMATED COUNT: 12.8 % (ref 11.5–15)
GFR SERPL CREATININE-BSD FRML MDRD: >60 ML/MIN/1.73M2
GLUCOSE SERPL-MCNC: 99 MG/DL (ref 74–99)
HCT VFR BLD AUTO: 40.2 % (ref 37–54)
HGB BLD-MCNC: 13.8 G/DL (ref 12.5–16.5)
MCH RBC QN AUTO: 35.8 PG (ref 26–35)
MCHC RBC AUTO-ENTMCNC: 34.3 G/DL (ref 32–34.5)
MCV RBC AUTO: 104.4 FL (ref 80–99.9)
MICROORGANISM SPEC CULT: ABNORMAL
MICROORGANISM SPEC CULT: NORMAL
MICROORGANISM SPEC CULT: NORMAL
MICROORGANISM/AGENT SPEC: ABNORMAL
MICROORGANISM/AGENT SPEC: NORMAL
MICROORGANISM/AGENT SPEC: NORMAL
PLATELET # BLD AUTO: 261 K/UL (ref 130–450)
PMV BLD AUTO: 9.2 FL (ref 7–12)
POTASSIUM SERPL-SCNC: 4.6 MMOL/L (ref 3.5–5)
RBC # BLD AUTO: 3.85 M/UL (ref 3.8–5.8)
SERVICE CMNT-IMP: ABNORMAL
SODIUM SERPL-SCNC: 126 MMOL/L (ref 132–146)
SPECIMEN DESCRIPTION: ABNORMAL
SPECIMEN DESCRIPTION: NORMAL
SPECIMEN DESCRIPTION: NORMAL
WBC OTHER # BLD: 19.9 K/UL (ref 4.5–11.5)

## 2023-10-14 PROCEDURE — C1751 CATH, INF, PER/CENT/MIDLINE: HCPCS

## 2023-10-14 PROCEDURE — 6370000000 HC RX 637 (ALT 250 FOR IP): Performed by: INTERNAL MEDICINE

## 2023-10-14 PROCEDURE — 6370000000 HC RX 637 (ALT 250 FOR IP): Performed by: NEUROLOGICAL SURGERY

## 2023-10-14 PROCEDURE — 6360000002 HC RX W HCPCS: Performed by: INTERNAL MEDICINE

## 2023-10-14 PROCEDURE — 76937 US GUIDE VASCULAR ACCESS: CPT

## 2023-10-14 PROCEDURE — 05HY33Z INSERTION OF INFUSION DEVICE INTO UPPER VEIN, PERCUTANEOUS APPROACH: ICD-10-PCS | Performed by: INTERNAL MEDICINE

## 2023-10-14 PROCEDURE — 2580000003 HC RX 258: Performed by: INTERNAL MEDICINE

## 2023-10-14 PROCEDURE — 36415 COLL VENOUS BLD VENIPUNCTURE: CPT

## 2023-10-14 PROCEDURE — 71045 X-RAY EXAM CHEST 1 VIEW: CPT

## 2023-10-14 PROCEDURE — 85027 COMPLETE CBC AUTOMATED: CPT

## 2023-10-14 PROCEDURE — 80048 BASIC METABOLIC PNL TOTAL CA: CPT

## 2023-10-14 PROCEDURE — 36569 INSJ PICC 5 YR+ W/O IMAGING: CPT

## 2023-10-14 RX ORDER — OXYCODONE HYDROCHLORIDE 5 MG/1
5 TABLET ORAL EVERY 4 HOURS PRN
Status: DISCONTINUED | OUTPATIENT
Start: 2023-10-14 | End: 2023-10-14 | Stop reason: HOSPADM

## 2023-10-14 RX ADMIN — METOPROLOL TARTRATE 50 MG: 50 TABLET ORAL at 08:51

## 2023-10-14 RX ADMIN — DOCUSATE SODIUM 200 MG: 100 CAPSULE, LIQUID FILLED ORAL at 08:50

## 2023-10-14 RX ADMIN — VANCOMYCIN HYDROCHLORIDE 1500 MG: 10 INJECTION, POWDER, LYOPHILIZED, FOR SOLUTION INTRAVENOUS at 00:01

## 2023-10-14 RX ADMIN — OXYCODONE HYDROCHLORIDE 10 MG: 10 TABLET ORAL at 06:13

## 2023-10-14 RX ADMIN — GABAPENTIN 300 MG: 300 CAPSULE ORAL at 08:50

## 2023-10-14 RX ADMIN — FUROSEMIDE 20 MG: 20 TABLET ORAL at 08:50

## 2023-10-14 RX ADMIN — FOLIC ACID 500 MCG: 1 TABLET ORAL at 08:50

## 2023-10-14 RX ADMIN — SODIUM CHLORIDE, PRESERVATIVE FREE 10 ML: 5 INJECTION INTRAVENOUS at 08:51

## 2023-10-14 RX ADMIN — POTASSIUM CHLORIDE 10 MEQ: 750 TABLET, EXTENDED RELEASE ORAL at 08:50

## 2023-10-14 ASSESSMENT — PAIN - FUNCTIONAL ASSESSMENT: PAIN_FUNCTIONAL_ASSESSMENT: PREVENTS OR INTERFERES SOME ACTIVE ACTIVITIES AND ADLS

## 2023-10-14 ASSESSMENT — PAIN SCALES - GENERAL
PAINLEVEL_OUTOF10: 9
PAINLEVEL_OUTOF10: 4

## 2023-10-14 ASSESSMENT — PAIN DESCRIPTION - DESCRIPTORS: DESCRIPTORS: ACHING;DISCOMFORT

## 2023-10-14 ASSESSMENT — PAIN DESCRIPTION - ORIENTATION: ORIENTATION: LOWER

## 2023-10-14 ASSESSMENT — PAIN DESCRIPTION - LOCATION: LOCATION: BACK

## 2023-10-14 NOTE — PROGRESS NOTES
Vascular Access Procedure Note    Procedure Date:   10/14/2023    Pre-procedure Verification/Time-Out:  The proposed risks versus benefits of this procedure were discussed in detail by the physician with patient. written consent was obtained from the patient. Relevant documentation was reviewed prior to procedure including signed consent form and medications. All necessary equipment for procedure is available at time of procedure yes. An audible time out was done at 0940AM by team members, correctly identifying patients name, medical record number, correct side, correct site, and correct procedure to be performed with registered nurse members of the procedure team all in agreement.         Indication for Procedure:   Reason for Insertion: intravenous antibiotics    ASA Assessment (Required for Moderate & Deep Sedation):      Procedure:   Reason for Consultation: power PICC line    Clinician Performing Procedure:   Patricia De León RN    Assistant:  none    Sedation:   Analgesia Used: lidocaine 1%    Procedure Details/Findings:  Catheter Armenian Size: 5.5  Lot Number: [id number]  Product #: [id number]  Expiration Date: 06/30/2024  Maximum Barrier Precautions: cap, eye shield, full body drape, gloves, gown, handwashing, and mask  Skin Prep: chlorhexidine  Technique: modified seldinger and ultrasound guided with VPS  Attempts: 1  Exposed (cm): 0.5  Total (cm): 44  Placement Site: right basilic vein  Vessel Size: 0.52  Dressing: securement device, transparent dressing, and biopatch  Blood Return: Yes   Ultrasound Guidance: Yes   Arm Circumference Mid-Bicep (cm): 30  Chest X-Ray Ordered: chest x-ray  End Placement: svc    Complications:   none     Post-operative Condition:  stable  Patient Tolerated Procedure: well     Comments/Post-operative Education:   Post Procedure Interventions: no blood pressure sign placed above bed    Armando Pina RN  10/14/23  10:16 AM

## 2023-10-14 NOTE — PROGRESS NOTES
Department of Internal Medicine  Infectious Diseases  Progress  Note      C/C : Discitis , Staph epi bacteremia     Reports back pain improving   Denies fever     Afebrile        Current Facility-Administered Medications   Medication Dose Route Frequency Provider Last Rate Last Admin    oxyCODONE (ROXICODONE) immediate release tablet 5 mg  5 mg Oral Q4H PRN Leatah Garcia DO        lidocaine PF 1 % injection 5 mL  5 mL IntraDERmal Once Limbu, Kun GARCIA MD        sodium chloride flush 0.9 % injection 5-40 mL  5-40 mL IntraVENous 2 times per day Limbu, Kun GARCIA MD   10 mL at 10/14/23 0851    sodium chloride flush 0.9 % injection 5-40 mL  5-40 mL IntraVENous PRN Limbu, Kun GARCIA MD        0.9 % sodium chloride infusion   IntraVENous PRN Limbu, Kun GARCIA MD        heparin (PF) 100 UNIT/ML injection 300 Units  3 mL IntraVENous 2 times per day LimbuKun MD        heparin (PF) 100 UNIT/ML injection 300 Units  3 mL IntraVENous PRN Limbu, Kun GARCIA MD        vancomycin (VANCOCIN) 1500 mg in sodium chloride 0.9 % 250 mL IVPB  1,500 mg IntraVENous Q12H Limbu, Jaydon Beckman MD   Stopped at 10/14/23 0218    diazePAM (VALIUM) tablet 5 mg  5 mg Oral Q6H PRN Leana Alejo MD   5 mg at 10/12/23 2242    docusate sodium (COLACE) capsule 200 mg  200 mg Oral BID Leana Alejo MD   200 mg at 10/14/23 0850    furosemide (LASIX) tablet 20 mg  20 mg Oral Daily Remi Norton MD   20 mg at 10/14/23 0850    potassium chloride (KLOR-CON M) extended release tablet 10 mEq  10 mEq Oral Daily Jhon Lundy MD   10 mEq at 64/92/28 6523    folic acid (FOLVITE) tablet 500 mcg  500 mcg Oral Daily Ave Croft MD   500 mcg at 10/14/23 0850    gabapentin (NEURONTIN) capsule 300 mg  300 mg Oral BID Ave Croft MD   300 mg at 10/14/23 0850    metoprolol tartrate (LOPRESSOR) tablet 50 mg  50 mg Oral BID Ave Croft MD   50 mg at 10/14/23 0851    sodium chloride flush 0.9 % injection 5-40 mL  5-40 mL IntraVENous 2 times per day

## 2023-10-14 NOTE — PROGRESS NOTES
Updated sister that transport is here to get brother. Patient took all belongings including dentures, cell phone,  and brace. PICC line dressing re enforced d/t patient picking at dressing.

## 2023-10-14 NOTE — CARE COORDINATION
10/14/2023Discharge order noted. Per previous CM note patient is set up for transport to [facility] today around 11 am. [facility] to contact [facility] if any changes.   Electronically signed by SANDRA Ferro on 10/14/2023 at 7:56 AM

## 2023-10-14 NOTE — PROGRESS NOTES
Nurse to nurse called to gary howard spoke to Rick Garcia). No questions or concerns Peripheral IV removed, pt tolerated well. Spoke with [facility] transport they will now come in between . Family updated as well.

## 2023-10-14 NOTE — PLAN OF CARE
Problem: Pain  Goal: Verbalizes/displays adequate comfort level or baseline comfort level  Outcome: Progressing     Problem: Chronic Conditions and Co-morbidities  Goal: Patient's chronic conditions and co-morbidity symptoms are monitored and maintained or improved  Outcome: Progressing     Problem: Discharge Planning  Goal: Discharge to home or other facility with appropriate resources  Outcome: Progressing     Problem: Skin/Tissue Integrity  Goal: Absence of new skin breakdown  Description: 1. Monitor for areas of redness and/or skin breakdown  2. Assess vascular access sites hourly  3. Every 4-6 hours minimum:  Change oxygen saturation probe site  4. Every 4-6 hours:  If on nasal continuous positive airway pressure, respiratory therapy assess nares and determine need for appliance change or resting period.   Outcome: Progressing     Problem: Nutrition Deficit:  Goal: Optimize nutritional status  Outcome: Progressing

## 2023-10-15 LAB
MICROORGANISM SPEC CULT: NORMAL
MICROORGANISM SPEC CULT: NORMAL
SERVICE CMNT-IMP: NORMAL
SERVICE CMNT-IMP: NORMAL
SPECIMEN DESCRIPTION: NORMAL
SPECIMEN DESCRIPTION: NORMAL

## 2023-10-17 PROBLEM — R79.89 ELEVATED TROPONIN: Status: RESOLVED | Noted: 2023-09-17 | Resolved: 2023-10-17

## 2023-10-19 LAB
MICROORGANISM SPEC CULT: NORMAL
MICROORGANISM/AGENT SPEC: NORMAL
SPECIMEN DESCRIPTION: NORMAL

## 2023-10-22 LAB
MICROORGANISM SPEC CULT: NORMAL
MICROORGANISM/AGENT SPEC: NORMAL
SPECIMEN DESCRIPTION: NORMAL

## 2023-10-26 LAB
MICROORGANISM SPEC CULT: NORMAL
MICROORGANISM/AGENT SPEC: NORMAL
SPECIMEN DESCRIPTION: NORMAL

## 2023-10-29 LAB
MICROORGANISM SPEC CULT: NORMAL
MICROORGANISM/AGENT SPEC: NORMAL
SPECIMEN DESCRIPTION: NORMAL

## 2023-11-02 LAB
MICROORGANISM SPEC CULT: NORMAL
MICROORGANISM/AGENT SPEC: NORMAL
SPECIMEN DESCRIPTION: NORMAL

## 2023-11-05 LAB
MICROORGANISM SPEC CULT: NORMAL
MICROORGANISM/AGENT SPEC: NORMAL
SPECIMEN DESCRIPTION: NORMAL

## 2023-11-09 LAB
MICROORGANISM SPEC CULT: NORMAL
MICROORGANISM SPEC CULT: NORMAL
MICROORGANISM/AGENT SPEC: NORMAL
MICROORGANISM/AGENT SPEC: NORMAL
SPECIMEN DESCRIPTION: NORMAL
SPECIMEN DESCRIPTION: NORMAL

## 2023-11-16 LAB
MICROORGANISM SPEC CULT: NORMAL
MICROORGANISM/AGENT SPEC: NORMAL
SPECIMEN DESCRIPTION: NORMAL

## 2023-11-20 ENCOUNTER — OFFICE VISIT (OUTPATIENT)
Dept: NEUROSURGERY | Age: 63
End: 2023-11-20
Payer: SELF-PAY

## 2023-11-20 DIAGNOSIS — G06.1 EPIDURAL ABSCESS, L2-L5: Primary | ICD-10-CM

## 2023-11-20 PROCEDURE — 99024 POSTOP FOLLOW-UP VISIT: CPT | Performed by: PHYSICIAN ASSISTANT

## 2023-11-20 PROCEDURE — 99212 OFFICE O/P EST SF 10 MIN: CPT

## 2023-11-20 NOTE — PROGRESS NOTES
Post Operative Follow-up    Patient is status post: lumbar laminectomy for abscess. In rehab. Walking with walker. Still getting IV antibiotics. Physical Exam  Alert and Oriented X 3  PERRLA, TAMIKO CUENCA 5/5  Wound: C/D/I    A/P: patient is s/p L3-5 laminectomy for abscess 6 weeks ago. Continue AB per ID. Continue with restrictions.   F/u in 6 weeks

## 2023-11-23 LAB
MICROORGANISM SPEC CULT: NORMAL
MICROORGANISM/AGENT SPEC: NORMAL
SPECIMEN DESCRIPTION: NORMAL

## 2024-04-21 ENCOUNTER — HOSPITAL ENCOUNTER (EMERGENCY)
Age: 64
Discharge: HOME | End: 2024-04-21
Attending: EMERGENCY MEDICINE
Payer: OTHER GOVERNMENT

## 2024-04-21 ENCOUNTER — APPOINTMENT (OUTPATIENT)
Dept: CT IMAGING | Age: 64
End: 2024-04-21
Payer: OTHER GOVERNMENT

## 2024-04-21 VITALS
RESPIRATION RATE: 14 BRPM | HEART RATE: 84 BPM | BODY MASS INDEX: 28.01 KG/M2 | SYSTOLIC BLOOD PRESSURE: 106 MMHG | OXYGEN SATURATION: 97 % | DIASTOLIC BLOOD PRESSURE: 66 MMHG | HEIGHT: 76 IN | WEIGHT: 230 LBS | TEMPERATURE: 97.9 F

## 2024-04-21 DIAGNOSIS — S09.90XA CLOSED HEAD INJURY, INITIAL ENCOUNTER: ICD-10-CM

## 2024-04-21 DIAGNOSIS — F10.920 ACUTE ALCOHOLIC INTOXICATION WITHOUT COMPLICATION (HCC): Primary | ICD-10-CM

## 2024-04-21 DIAGNOSIS — S00.83XA FACIAL CONTUSION, INITIAL ENCOUNTER: ICD-10-CM

## 2024-04-21 LAB
ALBUMIN SERPL-MCNC: 3.5 G/DL (ref 3.5–5.2)
ALP SERPL-CCNC: 204 U/L (ref 40–129)
ALT SERPL-CCNC: 48 U/L (ref 0–40)
ANION GAP SERPL CALCULATED.3IONS-SCNC: 19 MMOL/L (ref 7–16)
AST SERPL-CCNC: 133 U/L (ref 0–39)
BASOPHILS # BLD: 0.14 K/UL (ref 0–0.2)
BASOPHILS NFR BLD: 3 % (ref 0–2)
BILIRUB SERPL-MCNC: 3.7 MG/DL (ref 0–1.2)
BUN SERPL-MCNC: 5 MG/DL (ref 6–23)
CALCIUM SERPL-MCNC: 9.1 MG/DL (ref 8.6–10.2)
CHLORIDE SERPL-SCNC: 101 MMOL/L (ref 98–107)
CO2 SERPL-SCNC: 21 MMOL/L (ref 22–29)
CREAT SERPL-MCNC: 0.7 MG/DL (ref 0.7–1.2)
EOSINOPHIL # BLD: 0.06 K/UL (ref 0.05–0.5)
EOSINOPHILS RELATIVE PERCENT: 1 % (ref 0–6)
ERYTHROCYTE [DISTWIDTH] IN BLOOD BY AUTOMATED COUNT: 15.4 % (ref 11.5–15)
ETHANOLAMINE SERPL-MCNC: 338 MG/DL
GFR SERPL CREATININE-BSD FRML MDRD: >90 ML/MIN/1.73M2
GLUCOSE SERPL-MCNC: 103 MG/DL (ref 74–99)
HCT VFR BLD AUTO: 44.5 % (ref 37–54)
HGB BLD-MCNC: 15.8 G/DL (ref 12.5–16.5)
IMM GRANULOCYTES # BLD AUTO: <0.03 K/UL (ref 0–0.58)
IMM GRANULOCYTES NFR BLD: 1 % (ref 0–5)
LYMPHOCYTES NFR BLD: 1.59 K/UL (ref 1.5–4)
LYMPHOCYTES RELATIVE PERCENT: 37 % (ref 20–42)
MCH RBC QN AUTO: 37.2 PG (ref 26–35)
MCHC RBC AUTO-ENTMCNC: 35.5 G/DL (ref 32–34.5)
MCV RBC AUTO: 104.7 FL (ref 80–99.9)
MONOCYTES NFR BLD: 0.54 K/UL (ref 0.1–0.95)
MONOCYTES NFR BLD: 13 % (ref 2–12)
NEUTROPHILS NFR BLD: 46 % (ref 43–80)
NEUTS SEG NFR BLD: 1.96 K/UL (ref 1.8–7.3)
PLATELET CONFIRMATION: NORMAL
PLATELET, FLUORESCENCE: 90 K/UL (ref 130–450)
PMV BLD AUTO: 11.6 FL (ref 7–12)
POTASSIUM SERPL-SCNC: 4 MMOL/L (ref 3.5–5)
PROT SERPL-MCNC: 7.7 G/DL (ref 6.4–8.3)
RBC # BLD AUTO: 4.25 M/UL (ref 3.8–5.8)
SODIUM SERPL-SCNC: 141 MMOL/L (ref 132–146)
WBC OTHER # BLD: 4.3 K/UL (ref 4.5–11.5)

## 2024-04-21 PROCEDURE — 85025 COMPLETE CBC W/AUTO DIFF WBC: CPT

## 2024-04-21 PROCEDURE — 2580000003 HC RX 258: Performed by: EMERGENCY MEDICINE

## 2024-04-21 PROCEDURE — 71260 CT THORAX DX C+: CPT

## 2024-04-21 PROCEDURE — 99285 EMERGENCY DEPT VISIT HI MDM: CPT

## 2024-04-21 PROCEDURE — 70486 CT MAXILLOFACIAL W/O DYE: CPT

## 2024-04-21 PROCEDURE — 96360 HYDRATION IV INFUSION INIT: CPT

## 2024-04-21 PROCEDURE — 74177 CT ABD & PELVIS W/CONTRAST: CPT

## 2024-04-21 PROCEDURE — 6360000004 HC RX CONTRAST MEDICATION: Performed by: RADIOLOGY

## 2024-04-21 PROCEDURE — G0480 DRUG TEST DEF 1-7 CLASSES: HCPCS

## 2024-04-21 PROCEDURE — 72125 CT NECK SPINE W/O DYE: CPT

## 2024-04-21 PROCEDURE — 80053 COMPREHEN METABOLIC PANEL: CPT

## 2024-04-21 PROCEDURE — 70450 CT HEAD/BRAIN W/O DYE: CPT

## 2024-04-21 RX ORDER — 0.9 % SODIUM CHLORIDE 0.9 %
1000 INTRAVENOUS SOLUTION INTRAVENOUS ONCE
Status: COMPLETED | OUTPATIENT
Start: 2024-04-21 | End: 2024-04-21

## 2024-04-21 RX ADMIN — SODIUM CHLORIDE 1000 ML: 9 INJECTION, SOLUTION INTRAVENOUS at 07:54

## 2024-04-21 RX ADMIN — IOPAMIDOL 75 ML: 755 INJECTION, SOLUTION INTRAVENOUS at 06:25

## 2024-04-21 ASSESSMENT — PAIN DESCRIPTION - LOCATION: LOCATION: FACE;HEAD

## 2024-04-21 ASSESSMENT — PAIN SCALES - GENERAL: PAINLEVEL_OUTOF10: 8

## 2024-04-21 ASSESSMENT — PAIN - FUNCTIONAL ASSESSMENT: PAIN_FUNCTIONAL_ASSESSMENT: 0-10

## 2024-04-21 NOTE — ED NOTES
Radiology Procedure Waiver   Name: Willem Kelley  : 1960  MRN: [id number]    Date:  24    Time: 5:59 AM EDT    Benefits of immediately proceeding with Radiology exam(s) without pre-testing outweigh the risks or are not indicated as specified below and therefore the following is/are being waived:    [] Pregnancy test   [] Patients LMP on-time and regular.   [] Patient had Tubal Ligation or has other Contraception Device.   [] Patient  is Menopausal or Premenarcheal.    [] Patient had Full or Partial Hysterectomy.    [] Protocol for Iodine allergy    [] MRI Questionnaire     [x] BUN/Creatinine   [] Patient age w/no hx of renal dysfunction.   [] Patient on Dialysis.   [] Recent Normal Labs.  Electronically signed by Tal Childress MD on 24 at 5:59 AM EDT               Tal Childress MD  24 0560
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
## 2024-04-22 NOTE — ED NOTES
Left message on pt's voicemail that his slippers were left in ER. Slippers placed in labeled bag and put in soiled utility room

## 2024-06-06 ENCOUNTER — APPOINTMENT (OUTPATIENT)
Dept: GENERAL RADIOLOGY | Age: 64
End: 2024-06-06
Payer: OTHER GOVERNMENT

## 2024-06-06 ENCOUNTER — HOSPITAL ENCOUNTER (INPATIENT)
Age: 64
LOS: 1 days | Discharge: HOME | End: 2024-06-07
Attending: EMERGENCY MEDICINE | Admitting: STUDENT IN AN ORGANIZED HEALTH CARE EDUCATION/TRAINING PROGRAM
Payer: OTHER GOVERNMENT

## 2024-06-06 ENCOUNTER — APPOINTMENT (OUTPATIENT)
Dept: CT IMAGING | Age: 64
End: 2024-06-06
Payer: OTHER GOVERNMENT

## 2024-06-06 DIAGNOSIS — F10.929 ACUTE ALCOHOLIC INTOXICATION WITH COMPLICATION (HCC): ICD-10-CM

## 2024-06-06 DIAGNOSIS — I48.91 ATRIAL FIBRILLATION, UNSPECIFIED TYPE (HCC): ICD-10-CM

## 2024-06-06 DIAGNOSIS — S73.004A DISLOCATION OF RIGHT HIP, INITIAL ENCOUNTER (HCC): Primary | ICD-10-CM
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
## 2024-06-06 DIAGNOSIS — Z91.148 NONCOMPLIANCE WITH MEDICATION REGIMEN: ICD-10-CM

## 2024-06-06 LAB
ALBUMIN SERPL-MCNC: 3.3 G/DL (ref 3.5–5.2)
ALP SERPL-CCNC: 178 U/L (ref 40–129)
ALT SERPL-CCNC: 30 U/L (ref 0–40)
AMPHET UR QL SCN: NEGATIVE
ANION GAP SERPL CALCULATED.3IONS-SCNC: 12 MMOL/L (ref 7–16)
APAP SERPL-MCNC: <5 UG/ML (ref 10–30)
AST SERPL-CCNC: 74 U/L (ref 0–39)
BARBITURATES UR QL SCN: NEGATIVE
BASOPHILS # BLD: 0.17 K/UL (ref 0–0.2)
BASOPHILS NFR BLD: 2 % (ref 0–2)
BENZODIAZ UR QL: NEGATIVE
BILIRUB SERPL-MCNC: 2.3 MG/DL (ref 0–1.2)
BUN SERPL-MCNC: 6 MG/DL (ref 6–23)
BUPRENORPHINE UR QL: NEGATIVE
CALCIUM SERPL-MCNC: 9 MG/DL (ref 8.6–10.2)
CANNABINOIDS UR QL SCN: POSITIVE
CHLORIDE SERPL-SCNC: 102 MMOL/L (ref 98–107)
CO2 SERPL-SCNC: 24 MMOL/L (ref 22–29)
COCAINE UR QL SCN: NEGATIVE
CREAT SERPL-MCNC: 0.6 MG/DL (ref 0.7–1.2)
EKG ATRIAL RATE: 85 BPM
EKG Q-T INTERVAL: 394 MS
EKG QRS DURATION: 82 MS
EKG QTC CALCULATION (BAZETT): 503 MS
EKG R AXIS: 46 DEGREES
EKG T AXIS: 68 DEGREES
EKG VENTRICULAR RATE: 98 BPM
EOSINOPHIL # BLD: 0.12 K/UL (ref 0.05–0.5)
EOSINOPHILS RELATIVE PERCENT: 1 % (ref 0–6)
ERYTHROCYTE [DISTWIDTH] IN BLOOD BY AUTOMATED COUNT: 13 % (ref 11.5–15)
ETHANOLAMINE SERPL-MCNC: 212 MG/DL (ref 0–0.08)
FENTANYL UR QL: NEGATIVE
GFR, ESTIMATED: >90 ML/MIN/1.73M2
GLUCOSE SERPL-MCNC: 90 MG/DL (ref 74–99)
HCT VFR BLD AUTO: 42.4 % (ref 37–54)
HGB BLD-MCNC: 15 G/DL (ref 12.5–16.5)
IMM GRANULOCYTES # BLD AUTO: 0.04 K/UL (ref 0–0.58)
IMM GRANULOCYTES NFR BLD: 0 % (ref 0–5)
INR PPP: 1.5
LYMPHOCYTES NFR BLD: 3.63 K/UL (ref 1.5–4)
LYMPHOCYTES RELATIVE PERCENT: 36 % (ref 20–42)
MCH RBC QN AUTO: 36.7 PG (ref 26–35)
MCHC RBC AUTO-ENTMCNC: 35.4 G/DL (ref 32–34.5)
MCV RBC AUTO: 103.7 FL (ref 80–99.9)
METHADONE UR QL: NEGATIVE
MONOCYTES NFR BLD: 1.14 K/UL (ref 0.1–0.95)
MONOCYTES NFR BLD: 11 % (ref 2–12)
NEUTROPHILS NFR BLD: 50 % (ref 43–80)
NEUTS SEG NFR BLD: 5.08 K/UL (ref 1.8–7.3)
OPIATES UR QL SCN: NEGATIVE
OXYCODONE UR QL SCN: NEGATIVE
PCP UR QL SCN: NEGATIVE
PLATELET # BLD AUTO: 166 K/UL (ref 130–450)
PMV BLD AUTO: 10.4 FL (ref 7–12)
POTASSIUM SERPL-SCNC: 3.5 MMOL/L (ref 3.5–5)
PROT SERPL-MCNC: 7.2 G/DL (ref 6.4–8.3)
PROTHROMBIN TIME: 16.4 SEC (ref 9.3–12.4)
RBC # BLD AUTO: 4.09 M/UL (ref 3.8–5.8)
SALICYLATES SERPL-MCNC: <0.3 MG/DL (ref 0–30)
SODIUM SERPL-SCNC: 138 MMOL/L (ref 132–146)
TEST INFORMATION: ABNORMAL
TOXIC TRICYCLIC SC,BLOOD: NEGATIVE
TROPONIN I SERPL HS-MCNC: 32 NG/L (ref 0–11)
TROPONIN I SERPL HS-MCNC: 33 NG/L (ref 0–11)
TROPONIN I SERPL HS-MCNC: 34 NG/L (ref 0–11)
WBC OTHER # BLD: 10.2 K/UL (ref 4.5–11.5)

## 2024-06-06 PROCEDURE — 84484 ASSAY OF TROPONIN QUANT: CPT

## 2024-06-06 PROCEDURE — 6360000002 HC RX W HCPCS: Performed by: STUDENT IN AN ORGANIZED HEALTH CARE EDUCATION/TRAINING PROGRAM

## 2024-06-06 PROCEDURE — 6370000000 HC RX 637 (ALT 250 FOR IP): Performed by: STUDENT IN AN ORGANIZED HEALTH CARE EDUCATION/TRAINING PROGRAM

## 2024-06-06 PROCEDURE — 99285 EMERGENCY DEPT VISIT HI MDM: CPT

## 2024-06-06 PROCEDURE — 73502 X-RAY EXAM HIP UNI 2-3 VIEWS: CPT

## 2024-06-06 PROCEDURE — 80179 DRUG ASSAY SALICYLATE: CPT

## 2024-06-06 PROCEDURE — 2580000003 HC RX 258: Performed by: STUDENT IN AN ORGANIZED HEALTH CARE EDUCATION/TRAINING PROGRAM

## 2024-06-06 PROCEDURE — G0480 DRUG TEST DEF 1-7 CLASSES: HCPCS

## 2024-06-06 PROCEDURE — 1200000000 HC SEMI PRIVATE

## 2024-06-06 PROCEDURE — 2500000003 HC RX 250 WO HCPCS: Performed by: STUDENT IN AN ORGANIZED HEALTH CARE EDUCATION/TRAINING PROGRAM

## 2024-06-06 PROCEDURE — 2500000003 HC RX 250 WO HCPCS: Performed by: EMERGENCY MEDICINE

## 2024-06-06 PROCEDURE — 93005 ELECTROCARDIOGRAM TRACING: CPT | Performed by: EMERGENCY MEDICINE

## 2024-06-06 PROCEDURE — 72125 CT NECK SPINE W/O DYE: CPT

## 2024-06-06 PROCEDURE — 93010 ELECTROCARDIOGRAM REPORT: CPT | Performed by: INTERNAL MEDICINE

## 2024-06-06 PROCEDURE — 70450 CT HEAD/BRAIN W/O DYE: CPT

## 2024-06-06 PROCEDURE — 71045 X-RAY EXAM CHEST 1 VIEW: CPT

## 2024-06-06 PROCEDURE — 27252 TREAT HIP DISLOCATION: CPT

## 2024-06-06 PROCEDURE — 6360000002 HC RX W HCPCS: Performed by: EMERGENCY MEDICINE

## 2024-06-06 PROCEDURE — 72128 CT CHEST SPINE W/O DYE: CPT

## 2024-06-06 PROCEDURE — 80143 DRUG ASSAY ACETAMINOPHEN: CPT

## 2024-06-06 PROCEDURE — 72131 CT LUMBAR SPINE W/O DYE: CPT

## 2024-06-06 PROCEDURE — 85025 COMPLETE CBC W/AUTO DIFF WBC: CPT

## 2024-06-06 PROCEDURE — 80053 COMPREHEN METABOLIC PANEL: CPT

## 2024-06-06 PROCEDURE — 80307 DRUG TEST PRSMV CHEM ANLYZR: CPT

## 2024-06-06 PROCEDURE — 0SW9XJZ REVISION OF SYNTHETIC SUBSTITUTE IN RIGHT HIP JOINT, EXTERNAL APPROACH: ICD-10-PCS | Performed by: EMERGENCY MEDICINE

## 2024-06-06 PROCEDURE — 73552 X-RAY EXAM OF FEMUR 2/>: CPT

## 2024-06-06 PROCEDURE — 85610 PROTHROMBIN TIME: CPT

## 2024-06-06 PROCEDURE — 36415 COLL VENOUS BLD VENIPUNCTURE: CPT

## 2024-06-06 RX ORDER — POTASSIUM CHLORIDE 20 MEQ/1
40 TABLET, EXTENDED RELEASE ORAL PRN
Status: DISCONTINUED | OUTPATIENT
Start: 2024-06-06 | End: 2024-06-07 | Stop reason: HOSPADM

## 2024-06-06 RX ORDER — CALCIUM CARBONATE 300MG(750)
1 TABLET,CHEWABLE ORAL DAILY
COMMUNITY

## 2024-06-06 RX ORDER — MAGNESIUM SULFATE IN WATER 40 MG/ML
2000 INJECTION, SOLUTION INTRAVENOUS PRN
Status: DISCONTINUED | OUTPATIENT
Start: 2024-06-06 | End: 2024-06-07 | Stop reason: HOSPADM

## 2024-06-06 RX ORDER — POLYETHYLENE GLYCOL 3350 17 G/17G
17 POWDER, FOR SOLUTION ORAL DAILY PRN
Status: DISCONTINUED | OUTPATIENT
Start: 2024-06-06 | End: 2024-06-07 | Stop reason: HOSPADM

## 2024-06-06 RX ORDER — LORAZEPAM 1 MG/1
1 TABLET ORAL
Status: DISCONTINUED | OUTPATIENT
Start: 2024-06-06 | End: 2024-06-07 | Stop reason: HOSPADM

## 2024-06-06 RX ORDER — ENOXAPARIN SODIUM 100 MG/ML
40 INJECTION SUBCUTANEOUS DAILY
Status: DISCONTINUED | OUTPATIENT
Start: 2024-06-06 | End: 2024-06-06

## 2024-06-06 RX ORDER — LORAZEPAM 1 MG/1
2 TABLET ORAL
Status: DISCONTINUED | OUTPATIENT
Start: 2024-06-06 | End: 2024-06-07 | Stop reason: HOSPADM

## 2024-06-06 RX ORDER — POTASSIUM CHLORIDE 7.45 MG/ML
10 INJECTION INTRAVENOUS PRN
Status: DISCONTINUED | OUTPATIENT
Start: 2024-06-06 | End: 2024-06-07 | Stop reason: HOSPADM

## 2024-06-06 RX ORDER — CHLORDIAZEPOXIDE HYDROCHLORIDE 25 MG/1
25 CAPSULE, GELATIN COATED ORAL ONCE
Status: COMPLETED | OUTPATIENT
Start: 2024-06-06 | End: 2024-06-06

## 2024-06-06 RX ORDER — KETAMINE HCL IN NACL, ISO-OSM 100MG/10ML
50 SYRINGE (ML) INJECTION ONCE
Status: COMPLETED | OUTPATIENT
Start: 2024-06-06 | End: 2024-06-06

## 2024-06-06 RX ORDER — POTASSIUM CHLORIDE 20 MEQ/1
40 TABLET, EXTENDED RELEASE ORAL ONCE
Status: COMPLETED | OUTPATIENT
Start: 2024-06-06 | End: 2024-06-06

## 2024-06-06 RX ORDER — THIAMINE HYDROCHLORIDE 100 MG/ML
100 INJECTION, SOLUTION INTRAMUSCULAR; INTRAVENOUS DAILY
Status: DISCONTINUED | OUTPATIENT
Start: 2024-06-06 | End: 2024-06-06

## 2024-06-06 RX ORDER — KETAMINE HCL IN NACL, ISO-OSM 100MG/10ML
100 SYRINGE (ML) INJECTION ONCE
Status: DISCONTINUED | OUTPATIENT
Start: 2024-06-06 | End: 2024-06-07

## 2024-06-06 RX ORDER — SODIUM CHLORIDE 0.9 % (FLUSH) 0.9 %
5-40 SYRINGE (ML) INJECTION PRN
Status: DISCONTINUED | OUTPATIENT
Start: 2024-06-06 | End: 2024-06-07 | Stop reason: HOSPADM

## 2024-06-06 RX ORDER — METOPROLOL TARTRATE 50 MG/1
50 TABLET, FILM COATED ORAL 2 TIMES DAILY
Status: DISCONTINUED | OUTPATIENT
Start: 2024-06-06 | End: 2024-06-07 | Stop reason: HOSPADM

## 2024-06-06 RX ORDER — ACETAMINOPHEN 325 MG/1
650 TABLET ORAL EVERY 6 HOURS PRN
Status: DISCONTINUED | OUTPATIENT
Start: 2024-06-06 | End: 2024-06-07 | Stop reason: HOSPADM

## 2024-06-06 RX ORDER — LANOLIN ALCOHOL/MO/W.PET/CERES
400 CREAM (GRAM) TOPICAL DAILY
Status: DISCONTINUED | OUTPATIENT
Start: 2024-06-06 | End: 2024-06-07

## 2024-06-06 RX ORDER — METOPROLOL TARTRATE 50 MG/1
50 TABLET, FILM COATED ORAL 2 TIMES DAILY
COMMUNITY

## 2024-06-06 RX ORDER — POTASSIUM CHLORIDE 20 MEQ/1
20 TABLET, EXTENDED RELEASE ORAL DAILY
COMMUNITY

## 2024-06-06 RX ORDER — SODIUM CHLORIDE 9 MG/ML
INJECTION, SOLUTION INTRAVENOUS PRN
Status: DISCONTINUED | OUTPATIENT
Start: 2024-06-06 | End: 2024-06-07 | Stop reason: HOSPADM

## 2024-06-06 RX ORDER — LORAZEPAM 2 MG/ML
1 INJECTION INTRAMUSCULAR
Status: DISCONTINUED | OUTPATIENT
Start: 2024-06-06 | End: 2024-06-07 | Stop reason: HOSPADM

## 2024-06-06 RX ORDER — SODIUM CHLORIDE 0.9 % (FLUSH) 0.9 %
5-40 SYRINGE (ML) INJECTION EVERY 12 HOURS SCHEDULED
Status: DISCONTINUED | OUTPATIENT
Start: 2024-06-06 | End: 2024-06-07 | Stop reason: HOSPADM

## 2024-06-06 RX ORDER — LORAZEPAM 2 MG/ML
2 INJECTION INTRAMUSCULAR
Status: DISCONTINUED | OUTPATIENT
Start: 2024-06-06 | End: 2024-06-07 | Stop reason: HOSPADM

## 2024-06-06 RX ORDER — ACETAMINOPHEN 650 MG/1
650 SUPPOSITORY RECTAL EVERY 6 HOURS PRN
Status: DISCONTINUED | OUTPATIENT
Start: 2024-06-06 | End: 2024-06-07 | Stop reason: HOSPADM

## 2024-06-06 RX ORDER — ONDANSETRON 2 MG/ML
4 INJECTION INTRAMUSCULAR; INTRAVENOUS EVERY 6 HOURS PRN
Status: DISCONTINUED | OUTPATIENT
Start: 2024-06-06 | End: 2024-06-07 | Stop reason: HOSPADM

## 2024-06-06 RX ORDER — FUROSEMIDE 40 MG/1
40 TABLET ORAL DAILY
COMMUNITY

## 2024-06-06 RX ORDER — ONDANSETRON 4 MG/1
4 TABLET, ORALLY DISINTEGRATING ORAL EVERY 8 HOURS PRN
Status: DISCONTINUED | OUTPATIENT
Start: 2024-06-06 | End: 2024-06-07 | Stop reason: HOSPADM

## 2024-06-06 RX ADMIN — Medication 50 MG: at 02:31

## 2024-06-06 RX ADMIN — PROPOFOL 50 MG: 10 INJECTION, EMULSION INTRAVENOUS at 02:31

## 2024-06-06 RX ADMIN — APIXABAN 5 MG: 5 TABLET, FILM COATED ORAL at 22:25

## 2024-06-06 RX ADMIN — POTASSIUM CHLORIDE 40 MEQ: 1500 TABLET, EXTENDED RELEASE ORAL at 08:20

## 2024-06-06 RX ADMIN — METOPROLOL TARTRATE 50 MG: 50 TABLET, FILM COATED ORAL at 22:25

## 2024-06-06 RX ADMIN — Medication 400 MG: at 17:16

## 2024-06-06 RX ADMIN — SODIUM CHLORIDE, PRESERVATIVE FREE 10 ML: 5 INJECTION INTRAVENOUS at 08:19

## 2024-06-06 RX ADMIN — THIAMINE HYDROCHLORIDE: 100 INJECTION, SOLUTION INTRAMUSCULAR; INTRAVENOUS at 08:22

## 2024-06-06 RX ADMIN — CHLORDIAZEPOXIDE HYDROCHLORIDE 25 MG: 25 CAPSULE ORAL at 17:15

## 2024-06-06 RX ADMIN — APIXABAN 5 MG: 5 TABLET, FILM COATED ORAL at 15:15

## 2024-06-06 RX ADMIN — METOPROLOL TARTRATE 50 MG: 50 TABLET, FILM COATED ORAL at 11:09

## 2024-06-06 RX ADMIN — SODIUM CHLORIDE, PRESERVATIVE FREE 10 ML: 5 INJECTION INTRAVENOUS at 22:26

## 2024-06-06 ASSESSMENT — PAIN SCALES - GENERAL
PAINLEVEL_OUTOF10: 9
PAINLEVEL_OUTOF10: 3
PAINLEVEL_OUTOF10: 1

## 2024-06-06 ASSESSMENT — PAIN DESCRIPTION - PAIN TYPE: TYPE: ACUTE PAIN

## 2024-06-06 ASSESSMENT — PAIN DESCRIPTION - LOCATION
LOCATION: LEG;KNEE
LOCATION: HIP

## 2024-06-06 ASSESSMENT — PAIN DESCRIPTION - DESCRIPTORS
DESCRIPTORS: ACHING;THROBBING;DULL
DESCRIPTORS: ACHING

## 2024-06-06 ASSESSMENT — PAIN DESCRIPTION - FREQUENCY: FREQUENCY: INTERMITTENT

## 2024-06-06 ASSESSMENT — PAIN DESCRIPTION - ORIENTATION
ORIENTATION: RIGHT
ORIENTATION: RIGHT

## 2024-06-06 ASSESSMENT — PAIN - FUNCTIONAL ASSESSMENT: PAIN_FUNCTIONAL_ASSESSMENT: PREVENTS OR INTERFERES SOME ACTIVE ACTIVITIES AND ADLS

## 2024-06-06 ASSESSMENT — PAIN DESCRIPTION - ONSET: ONSET: ON-GOING

## 2024-06-06 NOTE — DISCHARGE INSTRUCTIONS
[facility] Department of Orthopedic Surgery  [address] [address]    Dr. Brent Esquivel, DO         MD Dr. Brent Stout MD Frank Ansevin, PA-C Sara Zatchok, PA-C Tyler Tsangaris PA-C      Orthopaedics Discharge Instructions   Weight bearing Status - Weight bearing as tolerated - on right lower Extremity in knee immobilizer  Pain Medication   Contact Office for Medication Refill- [phone]  Office can refill pain medications no sooner than every 7 days  If patient discharging to facility then pain control will be continued per facility physician  Ice to operative/injured site for 15-30 minutes of each hour for next 5 days    Recommend that you continue to ice the area 2-3 times per day after this   Elevate operative/injured limb on 2 pillows at home  Goal is to have limb above the heart if able      Follow up in office in approximately 2 weeks. Your first follow up appointment is often with one of our physician assistants.     Call the office at [phone] if having any concerns.     Watch for these significant complications.  Call physician if they or any other problems occur:  Fever over 101°, redness, swelling or warmth at the operative site  Unrelieved nausea    Foul smelling or cloudy drainage at the operative site   Unrelieved pain    Blood soaked dressing. (Some oozing may be normal)     Numb, pale, blue, cold or tingling extremity          It is the Department of Orthopaedic Trauma's standard of practice that providers will de-escalate (wean) all patients from narcotic (opioid) medications during the post-operative period.   We provide multimodal pain control, but opioid medications are tapered in all of our patients.  If patient requires referral to pain management for prolonged taper from opioid pain medication, we will facilitate this process.        Weight bearing is an important component to your healing fracture or injury. If you put weight on your

## 2024-06-06 NOTE — PROGRESS NOTES
Orthopedic surgery interval update:    Patient seen and examined, patient alert and oriented this morning.  Upon history review, patient states he had remote history of right hip fracture treated operatively in [place].  Approximately 15 years ago he had right total hip arthroplasty completed by unknown surgeon.  Patient reports his hip has been doing well since that surgery with no dislocations/issues.    Right lower extremity:  Knee immobilizer in place  Skin intact circumferentially, well-healed incision.  No signs of erythema and discharge.  Thigh/lower leg compartments soft and compressible  Positive plantarflexion, dorsiflexion, great toe extension  2+/4 DP and PT pulse, foot warm well-perfused  Distal sensation mildly diminished about superficial and deep peroneal, tibial, sural, saphenous nerves, which patient reports is unchanged from his normal    bilateralUE: No obvious signs of trauma.  -TTP to fingers, hand, wrist, forearm, elbow, humerus, shoulder or clavicle.-- Patient able to flex/extend fingers, wrist, elbow and shoulder with active and passive ROM without pain, +2/4 Radial pulse, compartments soft and compressible.    leftLE: No obvious signs of trauma.   -TTP to foot, ankle, leg, knee, thigh, hip.-- Patient able to flex/extend toes, ankle, knee and hip with active and passive ROM without pain,+2/4 DP & PT pulses, compartments soft and compressible.      Patient appropriate for discharge from orthopedic surgery standpoint, follow-up outpatient with Dr. Mitchell 2 weeks for repeat evaluation.  Patient instructed to maintain knee immobilizer.      Electronically signed by Abel Burr DO on 6/6/2024 at 11:03 AM

## 2024-06-06 NOTE — PROGRESS NOTES
4 Eyes Skin Assessment     NAME:  Willem Kelley  YOB: 1960  MEDICAL RECORD NUMBER:  [id number]    The patient is being assessed for  Admission    I agree that at least one RN has performed a thorough Head to Toe Skin Assessment on the patient. ALL assessment sites listed below have been assessed.      Areas assessed by both nurses:    Head, Face, Ears, Shoulders, Back, Chest, Arms, Elbows, Hands, Sacrum. Buttock, Coccyx, Ischium, Legs. Feet and Heels, and Under Medical Devices         Does the Patient have a Wound? No noted wound(s)       Suman Prevention initiated by RN: No  Wound Care Orders initiated by RN: No    Pressure Injury (Stage 3,4, Unstageable, DTI, NWPT, and Complex wounds) if present, place Wound referral order by RN under : No    New Ostomies, if present place, Ostomy referral order under : No     Nurse 1 eSignature: Electronically signed by Jazmin Valles RN on 6/6/24 at 4:06 PM EDT    **SHARE this note so that the co-signing nurse can place an eSignature**    Nurse 2 eSignature: Electronically signed by ALISA SCHMITT RN on 6/6/24 at 4:07 PM EDT

## 2024-06-06 NOTE — CONSULTS
Department of Orthopedic Surgery  Resident Consult Note          Reason for Consult: Right hip pain    HISTORY OF PRESENT ILLNESS:       Patient is a 63 y.o. male who presents with right hip pain.  Patient is currently altered unable to provide any history patient reportedly had a fall at home and had immediate right hip pain and inability ambulate.  Patient is reportedly intoxicated.  Patient was given ketamine and route to the ED. Denies any other orthopedic complaints at this time.      Patient has a history of right total hip arthroplasty unknown timing, states it was done outside facility in [place].    Past Medical History:        Diagnosis Date    Atrial fibrillation with RVR ([facility])      Past Surgical History:        Procedure Laterality Date    HIP FRACTURE SURGERY Right     Rt Hip. Operated on 4 times.    KNEE SURGERY Left     LAMINECTOMY Bilateral 10/10/2023    L3-L5 LUMBAR LAMINECTOMY POSTERIOR, L4-L5 DISK BIOPSY performed by Jose R Berkowitz MD at [facility] OR    LEG SURGERY Right      Current Medications:   No current facility-administered medications for this encounter.  Allergies:  Morphine    Social History:   TOBACCO:   reports that he has been smoking cigarettes. He has a 1.0 pack-year smoking history. He has been exposed to tobacco smoke. He has never used smokeless tobacco.  ETOH:   reports current alcohol use of about 3.0 standard drinks of alcohol per week.  DRUGS:   reports no history of drug use.  ACTIVITIES OF DAILY LIVING:    OCCUPATION:    Family History:   No family history on file.    REVIEW OF SYSTEMS:  Difficult to obtain secondary to patient's status    PHYSICAL EXAM:    VITALS:  /84   Pulse (!) 102   Temp 97.9 °F (36.6 °C) (Axillary)   Resp 18   SpO2 94%   CONSTITUTIONAL: Awake alert, confused  EYES: Lids and lashes normal, pupils equal, round and reactive to light, extra ocular muscles intact, sclera clear, conjunctiva normal  ENT: Normocephalic, without obvious abnormality,  appreciated.    IMPRESSION:  Right periprosthetic hip dislocation    PLAN:  Physical exam and imaging findings discussed with patient at bedside.  Patient is altered and reportedly intoxicated, patient's sister was contacted regarding medical decisions.  Acute reduction of patient's right hip under conscious sedation was discussed with patient's sister who gave consent.  All questions were answered at this time.  Conscious sedation was applied by ED, reduction maneuvers were performed with adequate reduction confirmed using x-ray imaging.  Weightbearing as tolerated right lower extremity in knee immobilizer  Pain control per ED  Discuss with attending      Electronically signed by Erickson Frank DO on 6/6/2024 at 1:40 AM  Note: This report was completed using computerHashTip voiced recognition software.  Every effort has been made to ensure accuracy; however, inadvertent computerized transcription errors may be present.

## 2024-06-06 NOTE — ED PROVIDER NOTES
on standby at all times during the procedure.    ----------------------------------- Post Conscious Sedation Note -----------------------------------    6/6/24     Time: 310 am (post-procedure stop time)  Post-Sedation Vital Signs: Vital signs were reviewed and were stable after the procedure (see flow sheet for vitals)       Post-Sedation Exam: Lungs: clear       Complications: none    Electronically Signed by: Hero Perez MD       EKG:  This EKG is signed and interpreted by me.    Rate: 98  Rhythm: Atrial fibrillation  Interpretation: non-specific EKG  Comparison: stable as compared to patient's most recent EKG compared to EKG from 10/10/2023      ------------------------- NURSING NOTES AND VITALS REVIEWED ---------------------------   The nursing notes within the ED encounter and vital signs as below have been reviewed by myself.  /78   Pulse 99   Temp 97.9 °F (36.6 °C) (Axillary)   Resp 16   SpO2 99%   Oxygen Saturation Interpretation: Normal    The patient’s available past medical records and past encounters were reviewed.        ------------------------------ ED COURSE/MEDICAL DECISION MAKING----------------------  Medications   propofol bolus 100 mg (100 mg IntraVENous Not Given 6/6/24 0200)   ketamine (KETALAR) injection 100 mg (100 mg IntraVENous Not Given 6/6/24 0159)   propofol bolus 50 mg (50 mg IntraVENous Given 6/6/24 0231)   ketamine (KETALAR) injection 50 mg (50 mg IntraVENous Given 6/6/24 0231)             Medical Decision Making:      History From:      Willem Kelley is a 63 y.o. male history of afibb history of alcohol abuse also has history of COPD history of fractured back presenting to the ED for fall, beginning short time ago.  The complaint has been persistent, moderate in severity, and worsened by movement of right lower leg.  Patient reportedly was going to bathroom.  Patient had been drinking.  Patient fell unknown as to whether patient did have syncopal episode.  Patient  plan.       --------------------------------- IMPRESSION AND DISPOSITION ---------------------------------    IMPRESSION  1. Dislocation of right hip, initial encounter ([facility])    2. Acute alcoholic intoxication with complication ([facility])    3. Fall from standing, initial encounter    4. Atrial fibrillation, unspecified type ([facility])    5. Noncompliance with medication regimen        DISPOSITION  Disposition: Admit to monitored  Patient condition is stable        NOTE: This report was transcribed using voice recognition software. Every effort was made to ensure accuracy; however, inadvertent computerized transcription errors may be present         Hero Perez MD  06/06/24 0120       Hero Perez MD  06/06/24 0658       Hero Perez MD  06/06/24 0731

## 2024-06-06 NOTE — H&P
Hospitalist History & Physical      PCP: No primary care provider on file.    Date of Admission: 6/6/2024    Date of Service: Pt seen/examined on 6/6/2024 and is admitted to Inpatient with expected LOS greater than two midnights due to medical therapy.      Chief Complaint: Fall and right leg deformity    History Of Present Illness: 63-year-old male with history of paroxysmal atrial fibrillation, noncompliant with Eliquis, HARPAL, chronic alcohol abuse, smoking and recent right hip fracture surgery in [place], total hip arthroplasty 15 years ago presented to the ER after a fall and right leg deformity.  Pain stated that he was in the toilet and slipped and his body was not more weight on the leg and another evaluation, after that he had excruciating right hip pain, \"I thought I was going to die\", and inability to move his leg.  EMS was called and brought him to the ER.  On arrival to the ER he received ketamine.  Upon ER evaluation which limited to history intake and ER.  Patient was afebrile, hemodynamically stable.  Labs with WBC 10.2, urine toxicology positive for marijuana, alcohol level 212, normal electrolytes BUN and creatinine.  Imagings demonstrated close right hip dislocation.  CT of the head with no acute intracranial normalities.  Evaluated by Ortho in the ER and reduction was performed with repeat x-ray after evaluation hip was in place.  Ortho recommended knee immobilizer in place.  Outpatient follow-up.  [facility] was consulted for admission to be evaluated by PT/OT.  Evaluated patient in the ER.  He admits to drink 6 beers every day and he is not planning to quit drinking.  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 Denies chest pain, shortness of breath. If he stops drinking he culd get shaking       Past Medical History:   Diagnosis Date    Atrial fibrillation with RVR ([facility])        Past  to the acetabular cup.  No acute displaced fracture is identified.  No focal soft tissue abnormality.     Right hip dislocation.     XR FEMUR RIGHT (MIN 2 VIEWS)    Result Date: 6/6/2024  EXAMINATION: TWO XRAY VIEWS OF THE RIGHT HIP; 4 XRAY VIEWS OF THE RIGHT FEMUR 6/6/2024 1:47 am COMPARISON: 09/15/2023 HISTORY: ORDERING SYSTEM PROVIDED HISTORY: fall, pain, r/o fx TECHNOLOGIST PROVIDED HISTORY: Reason for exam:->fall, pain, r/o fx; ORDERING SYSTEM PROVIDED HISTORY: fall TECHNOLOGIST PROVIDED HISTORY: Reason for exam:->fall FINDINGS: Status post right hip arthroplasty.  There is dislocation of the right femoral head component relative to the acetabular cup.  No acute displaced fracture is identified.  No focal soft tissue abnormality.     Right hip dislocation.       ASSESSMENT:    Closed dislocation of the right hip.  Initial encounter  Paroxysmal A-fib with RVR  Medical noncompliance.  Not taking Eliquis  Alcohol intoxication  Chronic alcohol abuse  Smoking  HARPAL      PLAN:    Telemetry  PT OT evaluation for discharge planning  Home medications resumed including Eliquis  Pain control  Banana bag given  Patient stated he will continue to drink  CIWA protocol            Diet: ADULT DIET; Regular; No Added Salt (3-4 gm)  Code Status: Full Code  Surrogate decision maker confirmed with patient:   Extended Emergency Contact Information  Primary Emergency Contact: evens khan  Home Phone: [phone]  Relation: Brother/Sister  Preferred language: English   needed? No  Secondary Emergency Contact: lilia madsen  Mobile Phone: [phone]  Relation: Ex-Spouse  Preferred language: English   needed? No    DVT Prophylaxis: []Lovenox []Heparin []PCD [] Warfarin/NOAC []Encouraged ambulation  Disposition: []Med/Surg [] Intermediate [] ICU/CCU  Admit status: [] Observation [] Inpatient     +++++++++++++++++++++++++++++++++++++++++++++++++  Maria Marino Milanes, MD  [facility]ist  Gino Garza -

## 2024-06-07 VITALS
RESPIRATION RATE: 18 BRPM | DIASTOLIC BLOOD PRESSURE: 66 MMHG | HEART RATE: 91 BPM | TEMPERATURE: 97.1 F | HEIGHT: 76 IN | SYSTOLIC BLOOD PRESSURE: 126 MMHG | WEIGHT: 229.94 LBS | OXYGEN SATURATION: 96 % | BODY MASS INDEX: 28 KG/M2

## 2024-06-07 LAB
ANION GAP SERPL CALCULATED.3IONS-SCNC: 10 MMOL/L (ref 7–16)
BUN SERPL-MCNC: 8 MG/DL (ref 6–23)
CALCIUM SERPL-MCNC: 8.1 MG/DL (ref 8.6–10.2)
CHLORIDE SERPL-SCNC: 105 MMOL/L (ref 98–107)
CO2 SERPL-SCNC: 24 MMOL/L (ref 22–29)
CREAT SERPL-MCNC: 0.7 MG/DL (ref 0.7–1.2)
GFR, ESTIMATED: >90 ML/MIN/1.73M2
GLUCOSE SERPL-MCNC: 116 MG/DL (ref 74–99)
MAGNESIUM SERPL-MCNC: 1.6 MG/DL (ref 1.6–2.6)
POTASSIUM SERPL-SCNC: 3.6 MMOL/L (ref 3.5–5)
SODIUM SERPL-SCNC: 139 MMOL/L (ref 132–146)

## 2024-06-07 PROCEDURE — 2580000003 HC RX 258: Performed by: STUDENT IN AN ORGANIZED HEALTH CARE EDUCATION/TRAINING PROGRAM

## 2024-06-07 PROCEDURE — 97530 THERAPEUTIC ACTIVITIES: CPT

## 2024-06-07 PROCEDURE — 83735 ASSAY OF MAGNESIUM: CPT

## 2024-06-07 PROCEDURE — 36415 COLL VENOUS BLD VENIPUNCTURE: CPT

## 2024-06-07 PROCEDURE — 97161 PT EVAL LOW COMPLEX 20 MIN: CPT

## 2024-06-07 PROCEDURE — 80048 BASIC METABOLIC PNL TOTAL CA: CPT

## 2024-06-07 PROCEDURE — 6370000000 HC RX 637 (ALT 250 FOR IP): Performed by: STUDENT IN AN ORGANIZED HEALTH CARE EDUCATION/TRAINING PROGRAM

## 2024-06-07 PROCEDURE — 97535 SELF CARE MNGMENT TRAINING: CPT

## 2024-06-07 PROCEDURE — 97165 OT EVAL LOW COMPLEX 30 MIN: CPT

## 2024-06-07 RX ORDER — POTASSIUM CHLORIDE 20 MEQ/1
40 TABLET, EXTENDED RELEASE ORAL ONCE
Status: COMPLETED | OUTPATIENT
Start: 2024-06-07 | End: 2024-06-07

## 2024-06-07 RX ORDER — LANOLIN ALCOHOL/MO/W.PET/CERES
400 CREAM (GRAM) TOPICAL 2 TIMES DAILY
Status: DISCONTINUED | OUTPATIENT
Start: 2024-06-07 | End: 2024-06-07 | Stop reason: HOSPADM

## 2024-06-07 RX ORDER — MAGNESIUM SULFATE IN WATER 40 MG/ML
2000 INJECTION, SOLUTION INTRAVENOUS ONCE
Status: DISCONTINUED | OUTPATIENT
Start: 2024-06-07 | End: 2024-06-07

## 2024-06-07 RX ADMIN — METOPROLOL TARTRATE 50 MG: 50 TABLET, FILM COATED ORAL at 09:40

## 2024-06-07 RX ADMIN — Medication 400 MG: at 09:40

## 2024-06-07 RX ADMIN — SODIUM CHLORIDE, PRESERVATIVE FREE 10 ML: 5 INJECTION INTRAVENOUS at 09:41

## 2024-06-07 RX ADMIN — POTASSIUM CHLORIDE 40 MEQ: 1500 TABLET, EXTENDED RELEASE ORAL at 09:40

## 2024-06-07 RX ADMIN — Medication 400 MG: at 10:54

## 2024-06-07 RX ADMIN — APIXABAN 5 MG: 5 TABLET, FILM COATED ORAL at 09:40

## 2024-06-07 NOTE — CARE COORDINATION
CM transition of care.  Patient presented to [facility] ER secondary to a fall, patient had been drinking.  Admitted with right hip dislocation.  Ortho consulted.  Acute reduction under conscious sedation preformed in the ED.  Knee immobilizer in place.  Ortho is okay for discharge with follow up in 2 weeks.  CIWA protocol.  Pt/Ot Am-pac 17 and 16 he ambulated 100 feet fww min A.  Met with patient at the bedside to discuss discharge planning.  Pt lives alone in a single story home. Reports being independent.  He owns, walkers, canes, rollators and wheelchairs.  [facility]  Discharge order noted.  He plans to return to home with no needs. His niece will pick him up.  [facility] notified of admission and discharge.  Ruy Velasquez RN  [phone]    Case Management Assessment  Initial Evaluation    Date/Time of Evaluation: 6/7/2024 2:38 PM  Assessment Completed by: Elyse Velasquez RN    If patient is discharged prior to next notation, then this note serves as note for discharge by case management.    Patient Name: Willem Kelley                   YOB: 1960  Diagnosis: Noncompliance with medication regimen [Z91.148]  Closed dislocation of right hip, initial encounter ([facility]) [S73.004A]  Dislocation of right hip, initial encounter ([facility]) [S73.004A]  Fall from standing, initial encounter [W19.XXXA]  Acute alcoholic intoxication with complication ([facility]) [F10.929]  Atrial fibrillation, unspecified type ([facility]) [I48.91]                   Date / Time: 6/6/2024  1:16 AM    Patient Admission Status: Inpatient   Readmission Risk (Low < 19, Mod (19-27), High > 27): Readmission Risk Score: 16.2    Current PCP: No primary care provider on file.  PCP verified by CM? Yes    Chart Reviewed: Yes      History Provided by: Patient  Patient Orientation: Alert and Oriented    Patient Cognition: Alert    Hospitalization in the last 30 days (Readmission):  No    If yes, Readmission Assessment  in  Navigator will be completed.    Advance Directives:      Code Status: Full Code   Patient's Primary Decision Maker is:      Primary Decision Maker: evens khan - Brother/Sister - [phone]    Discharge Planning:    Patient lives with: Alone Type of Home: House  Primary Care Giver: Self  Patient Support Systems include: Family Members   Current Financial resources:    Current community resources:    [facility]            Current DME:              Type of Home Care services:  None    ADLS  Prior functional level: Independent in ADLs/IADLs  Current functional level: Mobility, Assistance with the following:    PT AM-PAC: 17 /24  OT AM-PAC: 16 /24    Family can provide assistance at DC: Yes  Would you like Case Management to discuss the discharge plan with any other family members/significant others, and if so, who? No  Plans to Return to Present Housing: Yes  Other Identified Issues/Barriers to RETURNING to current housing:   Potential Assistance needed at discharge: N/A            Potential DME:    Patient expects to discharge to: House  Plan for transportation at discharge:      Financial    Payor: [employer] / Plan: [employer] / Product Type: *No Product type* /     Does insurance require precert for SNF: Yes    Potential assistance Purchasing Medications:    Meds-to-Beds request: Yes      [facility] [address] - P [phone] - F [phone]  [facility] [address]  [facility] [address]  Phone: [phone] Fax: [phone]      Notes:    Factors facilitating achievement of predicted outcomes:     Barriers to discharge:     Additional Case Management Notes:     The Plan for Transition of Care is related to the following treatment goals of Noncompliance with medication regimen [Z91.148]  Closed dislocation of right hip, initial encounter ([facility]) [S73.004A]  Dislocation of right hip, initial encounter ([facility]) [S73.004A]  Fall from standing,

## 2024-06-07 NOTE — PROGRESS NOTES
Occupational Therapy    OCCUPATIONAL THERAPY INITIAL EVALUATION    [facility]  [address], [place]        Date:2024                                                  Patient Name: Willem Kelley    MRN: [id number]    : 1960    Room: [address] [place] [address]          Evaluating OT: Angelina Dixon, GREGORYD, OTR/L; [id number]      Referring Provider: Milanes Marino, Maria, MD     Specific Provider Orders/Date: OT Eval and Treat 2024      Diagnosis: Noncompliance with medication regimen [Z91.148]  Closed dislocation of right hip, initial encounter ([facility]) [S73.004A]  Dislocation of right hip, initial encounter ([facility]) [S73.004A]  Fall from standing, initial encounter [W19.XXXA]  Acute alcoholic intoxication with complication ([facility]) [F10.929]  Atrial fibrillation, unspecified type ([facility]) [I48.91]       Surgery: Acute reduction of patient's right hip under conscious sedation  2024       Pertinent Medical History:  has a past medical history of Atrial fibrillation with RVR ([facility]).     Recommended Adaptive Equipment: AE for LE bathing and dressing PRN     Precautions:  Fall Risk, WBAT RLE with knee immobilizer     Assessment of current deficits    [x] Functional mobility  [x]ADLs  [x] Strength               [x]Cognition    [x] Functional transfers   [x] IADLs         [x] Safety Awareness   [x]Endurance    [x] Fine Coordination              [x] Balance      [] Vision/perception   []Sensation     []Gross Motor Coordination  [] ROM  [] Delirium                   [] Motor Control     OT PLAN OF CARE   OT POC based on physician orders, patient diagnosis and results of clinical assessment    Frequency/Duration 1-3 days/wk for 2 weeks PRN   Specific OT Treatment Interventions to include:   * Instruction/training on adapted ADL techniques and AE recommendations to increase functional independence within precautions       * Training on energy conservation

## 2024-06-07 NOTE — PROGRESS NOTES
Physical Therapy  Physical Therapy Initial Assessment     Name: Willem Kelley  : 1960  MRN: [id number]      Date of Service: 2024    Evaluating PT:  Alondra Reese PT, DPT [id number]    Room #:  8408/8408-B  Diagnosis:  Noncompliance with medication regimen [Z91.148]  Closed dislocation of right hip, initial encounter ([facility]) [S73.004A]  Dislocation of right hip, initial encounter ([facility]) [S73.004A]  Fall from standing, initial encounter [W19.XXXA]  Acute alcoholic intoxication with complication ([facility]) [F10.929]  Atrial fibrillation, unspecified type ([facility]) [I48.91]  PMHx/PSHx:   has a past medical history of Atrial fibrillation with RVR ([facility]).  Precautions:  Fall risk, WBAT RLE, knee immobilizer RLE   Equipment Needs:  TBD    SUBJECTIVE:    Pt lives alone in a 1 story home with 3 steps to enter and bilateral rail(s). Pt ambulated with no AD PTA. He owns a FWW, multiple Rollators, a SPC, and a SBQC. Pt is a retired rehab RN.    OBJECTIVE:   Initial Evaluation  Date: 24 Treatment Short Term/ Long Term   Goals   AM-PAC 6 Clicks      Was pt agreeable to Eval/treatment? Yes     Does pt have pain? 3/10 RLE pain at rest  5/10 pain standing  6/10 pain during ambulation     Bed Mobility  Rolling: NT  Supine to sit: NT  Sit to supine: NT  Scooting: SBA  Rolling: Independent  Supine to sit: Independent  Sit to supine: Independent  Scooting: Independent   Transfers Sit to stand: Orly  Stand to sit: Orly  Stand pivot: Orly with FWW  Sit to stand: Independent  Stand to sit: Independent   Stand pivot: Modified Independent with AAD   Ambulation    50 feet x2 with FWW and Orly  >400 feet with AAD and Modified Independent    Stair negotiation: ascended and descended  NT    *reviewed stair sequence  >3 steps with bilateral rail and Modified Independent    ROM BUE:  Refer to OT  BLE:  WFL     Strength BUE:  Refer to OT  BLE:  Not formally assessed     Balance Sitting EOB:  Supervision  Dynamic Standing:  Orly with AAD

## 2024-06-07 NOTE — ACP (ADVANCE CARE PLANNING)
Advance Care Planning   Healthcare Decision Maker:    Primary Decision Maker: evens khan - Brother/Sister - [phone]

## 2024-06-07 NOTE — DISCHARGE SUMMARY
Hospitalist Discharge Summary    Patient ID: Willem Kelley   Patient : 1960  Patient's PCP: No primary care provider on file.    Admit Date: 2024   Admitting Physician: Maria Milanes Marino, MD    Discharge Date:  2024   Discharge Physician: Maria Marino Milanes, MD   Discharge Condition: Stable  Discharge Disposition: Home      Hospital course in brief:  (Please refer to daily progress notes for a comprehensive review of the hospitalization by requesting medical records)            Physical Exam:    General appearance: No apparent distress ***  HEENT:  Normocephalic. Conjunctivae/corneas clear. Moist mucosa   Neck: Supple. No jugular venous distention. Thyroid not visible, non tender   Respiratory: Normal respiratory effort. Clear to auscultation bilaterally. No stridor/wheezing/rhonchi/crackles   Cardiovascular: Regular heart beats, normal S1-S2. No M/G/R  Abdomen: Non distended, soft, non tender, no visceromegaly, no mass, normal bowel sounds   Musculoskeletal: No clubbing, cyanosis, no bilateral lower extremity edema. Brisk capillary refill.   Skin:  No rashes  on visible skin  Neurologic: awake, alert and oriented x 3. Following commands. No focal neurological deficit.          Consults:   None    Discharge Diagnoses:        Discharge Instructions / Follow up:    No future appointments.    Continued appropriate risk factor modification of blood pressure, diabetes and serum lipids will remain essential to reducing risk of future atherosclerotic development    Activity: activity as tolerated    Significant labs:  CBC:   Recent Labs     24  013   WBC 10.2   RBC 4.09   HGB 15.0   HCT 42.4   .7*   RDW 13.0        BMP:   Recent Labs     24  0130 24  0420    139   K 3.5 3.6    105   CO2 24 24   BUN 6 8   CREATININE 0.6* 0.7   MG  --  1.6     LFT:  Recent Labs     24  0130   ALKPHOS 178*   ALT 30   AST 74*   BILITOT 2.3*     PT/INR:   Recent  abnormality TECHNOLOGIST PROVIDED HISTORY: Has a \"code stroke\" or \"stroke alert\" been called?->No Reason for exam:->Evaluate intracranial abnormality Decision Support Exception - unselect if not a suspected or confirmed emergency medical condition->Emergency Medical Condition (MA) What reading provider will be dictating this exam?->CRC FINDINGS: BRAIN/VENTRICLES: There is no acute intracranial hemorrhage, mass effect or midline shift.  No abnormal extra-axial fluid collection.  The gray-white differentiation is maintained without evidence of an acute infarct.  There is no evidence of hydrocephalus.  There are nonspecific hypoattenuating foci in the subcortical and periventricular white matter that most likely represent chronic microangiopathic ischemic changes in a patient of this age. There are atherosclerotic calcifications of the intracranial vessels. ORBITS: The visualized portion of the orbits demonstrate no acute abnormality. SINUSES: The visualized paranasal sinuses and mastoid air cells demonstrate no acute abnormality. SOFT TISSUES/SKULL:  No acute abnormality of the visualized skull or soft tissues.     No acute intracranial abnormality.     XR HIP RIGHT (2-3 VIEWS)    Result Date: 6/6/2024  EXAMINATION: TWO XRAY VIEWS OF THE RIGHT HIP 6/6/2024 2:20 am COMPARISON: Earlier today HISTORY: ORDERING SYSTEM PROVIDED HISTORY: reduction TECHNOLOGIST PROVIDED HISTORY: Reason for exam:->reduction FINDINGS: Status post interval reduction of the right hip dislocation.  No acute displaced fracture is identified.  No focal soft tissue abnormality.     Status post interval reduction of the right hip dislocation.     XR HIP RIGHT (2-3 VIEWS)    Result Date: 6/6/2024  EXAMINATION: TWO XRAY VIEWS OF THE RIGHT HIP; 4 XRAY VIEWS OF THE RIGHT FEMUR 6/6/2024 1:47 am COMPARISON: 09/15/2023 HISTORY: ORDERING SYSTEM PROVIDED HISTORY: fall, pain, r/o fx TECHNOLOGIST PROVIDED HISTORY: Reason for exam:->fall, pain, r/o fx; ORDERING

## 2024-06-07 NOTE — PROGRESS NOTES
4 Eyes Skin Assessment     NAME:  Willem Kelley  YOB: 1960  MEDICAL RECORD NUMBER:  [id number]    The patient is being assessed for  Admission    I agree that at least one RN has performed a thorough Head to Toe Skin Assessment on the patient. ALL assessment sites listed below have been assessed.      Areas assessed by both nurses:    Head, Face, Ears, Shoulders, Back, Chest, Arms, Elbows, Hands, Sacrum. Buttock, Coccyx, Ischium, Legs. Feet and Heels, and Under Medical Devices         Does the Patient have a Wound? No noted wound(s)       Suman Prevention initiated by RN: No  Wound Care Orders initiated by RN: No    Pressure Injury (Stage 3,4, Unstageable, DTI, NWPT, and Complex wounds) if present, place Wound referral order by RN under : No    New Ostomies, if present place, Ostomy referral order under : No     Nurse 1 eSignature: Electronically signed by Brent Tyson RN on 6/7/24 at 4:52 AM EDT    **SHARE this note so that the co-signing nurse can place an eSignature**    Nurse 2 eSignature: {Esignature:[id number]}

## 2024-06-10 ENCOUNTER — TELEPHONE (OUTPATIENT)
Dept: ORTHOPEDIC SURGERY | Age: 64
End: 2024-06-10

## 2024-06-10 NOTE — TELEPHONE ENCOUNTER
Call placed to patient at this time. Patient states that he was told to call this morning and come into office to have brace removed. Patient states that he already removed knee immobilizer and has been walking around the house and ambulating on the stairs without it. Patient also admits to driving with the RLE in knee immobilizer over the weekend, but \"just to [facility].\" Patient states he is having no pain. 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 I know about this stuff.\" Patient then went on to describe an [place]. Patient then went on to say he had a broken back not too long ago. 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 When patient became agitated over the 2 week appointment recommendation, I referenced both the ortho provider notes from ED and also the AVS from hospital discharge. Patient claimed his instructions did not say this and when challenged regarding this, as they were listed on page 5 of 12, patient changed the subject.    Patient was very hard to redirect throughout conversation. Patient was agitated one moment and the making jokes and laughing the next. Ultimately, patient did not want to make an appointment and states \"I think I'll be fine.\"    Total of 20 minutes spent on the phone with patient.     Electronically signed by Mariela Kaur ATC on 6/10/2024 at 1:39 PM

## 2024-06-10 NOTE — TELEPHONE ENCOUNTER
The patient left a voicemail this morning stating that he was not waiting \"48 hours to get a message back, that's ridiculous he was told to come to the office in the morning to get this brace off\". Called the patient back and he stated that he had already taken off the brace because it's now 11AM and he has not heard from the office\". I said sir it was 4 hours and I called you back and they reviewed the chart and you are suppose to come in 2 weeks. He was argumentative and stated that he was not coming in 2 weeks and that he was not told that in person and he had taken off the brace. He declined a appointment in 2 weeks. He then proceeded to ask me if that was okay. I told him that I am not answering that and that we have a protocol here in the office. I told him when we get ER follow up the PA has to review the chart and they tell us when to schedule and they told me to schedule you in 2 weeks. He stated no again that is not what he was told. I told him that he was suppose to leave the brace on until he was seen in the office and it should not have came off. 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 Told him that I would have our  call and talk to him due to not feeling comfortable continuing the conversation with him because he kept being argumentative, stating he was a nurse, and that provider didn't tell him that to his face.

## 2024-07-02 ENCOUNTER — HOSPITAL ENCOUNTER (EMERGENCY)
Age: 64
Discharge: HOME | End: 2024-07-03
Attending: STUDENT IN AN ORGANIZED HEALTH CARE EDUCATION/TRAINING PROGRAM
Payer: MEDICARE

## 2024-07-02 ENCOUNTER — APPOINTMENT (OUTPATIENT)
Dept: GENERAL RADIOLOGY | Age: 64
End: 2024-07-02
Payer: MEDICARE

## 2024-07-02 ENCOUNTER — APPOINTMENT (OUTPATIENT)
Dept: CT IMAGING | Age: 64
End: 2024-07-02
Payer: MEDICARE

## 2024-07-02 DIAGNOSIS — S73.004A DISLOCATION OF RIGHT HIP, INITIAL ENCOUNTER (HCC): ICD-10-CM

## 2024-07-02 DIAGNOSIS — F10.920 ACUTE ALCOHOLIC INTOXICATION WITHOUT COMPLICATION (HCC): ICD-10-CM

## 2024-07-02 DIAGNOSIS — I48.91 ATRIAL FIBRILLATION WITH RAPID VENTRICULAR RESPONSE (HCC): Primary | ICD-10-CM

## 2024-07-02 LAB
ALBUMIN SERPL-MCNC: 3.4 G/DL (ref 3.5–5.2)
ALP SERPL-CCNC: 228 U/L (ref 40–129)
ALT SERPL-CCNC: 21 U/L (ref 0–40)
ANION GAP SERPL CALCULATED.3IONS-SCNC: 13 MMOL/L (ref 7–16)
AST SERPL-CCNC: 60 U/L (ref 0–39)
BASOPHILS # BLD: 0.1 K/UL (ref 0–0.2)
BASOPHILS NFR BLD: 2 % (ref 0–2)
BILIRUB SERPL-MCNC: 2.1 MG/DL (ref 0–1.2)
BUN SERPL-MCNC: 7 MG/DL (ref 6–23)
CALCIUM SERPL-MCNC: 9.2 MG/DL (ref 8.6–10.2)
CHLORIDE SERPL-SCNC: 104 MMOL/L (ref 98–107)
CO2 SERPL-SCNC: 24 MMOL/L (ref 22–29)
CREAT SERPL-MCNC: 0.6 MG/DL (ref 0.7–1.2)
EOSINOPHIL # BLD: 0.01 K/UL (ref 0.05–0.5)
EOSINOPHILS RELATIVE PERCENT: 0 % (ref 0–6)
ERYTHROCYTE [DISTWIDTH] IN BLOOD BY AUTOMATED COUNT: 13.7 % (ref 11.5–15)
GFR, ESTIMATED: >90 ML/MIN/1.73M2
GLUCOSE SERPL-MCNC: 101 MG/DL (ref 74–99)
HCT VFR BLD AUTO: 46.2 % (ref 37–54)
HGB BLD-MCNC: 16 G/DL (ref 12.5–16.5)
IMM GRANULOCYTES # BLD AUTO: <0.03 K/UL (ref 0–0.58)
IMM GRANULOCYTES NFR BLD: 0 % (ref 0–5)
LYMPHOCYTES NFR BLD: 1.38 K/UL (ref 1.5–4)
LYMPHOCYTES RELATIVE PERCENT: 27 % (ref 20–42)
MAGNESIUM SERPL-MCNC: 1.9 MG/DL (ref 1.6–2.6)
MCH RBC QN AUTO: 36.8 PG (ref 26–35)
MCHC RBC AUTO-ENTMCNC: 34.6 G/DL (ref 32–34.5)
MCV RBC AUTO: 106.2 FL (ref 80–99.9)
MONOCYTES NFR BLD: 0.41 K/UL (ref 0.1–0.95)
MONOCYTES NFR BLD: 8 % (ref 2–12)
NEUTROPHILS NFR BLD: 62 % (ref 43–80)
NEUTS SEG NFR BLD: 3.13 K/UL (ref 1.8–7.3)
PLATELET, FLUORESCENCE: 119 K/UL (ref 130–450)
PMV BLD AUTO: 10.8 FL (ref 7–12)
POTASSIUM SERPL-SCNC: 4.1 MMOL/L (ref 3.5–5)
PROT SERPL-MCNC: 7.8 G/DL (ref 6.4–8.3)
RBC # BLD AUTO: 4.35 M/UL (ref 3.8–5.8)
SODIUM SERPL-SCNC: 141 MMOL/L (ref 132–146)
TROPONIN I SERPL HS-MCNC: 26 NG/L (ref 0–11)
WBC OTHER # BLD: 5 K/UL (ref 4.5–11.5)

## 2024-07-02 PROCEDURE — 6360000002 HC RX W HCPCS: Performed by: STUDENT IN AN ORGANIZED HEALTH CARE EDUCATION/TRAINING PROGRAM

## 2024-07-02 PROCEDURE — 99152 MOD SED SAME PHYS/QHP 5/>YRS: CPT

## 2024-07-02 PROCEDURE — 96361 HYDRATE IV INFUSION ADD-ON: CPT

## 2024-07-02 PROCEDURE — 93005 ELECTROCARDIOGRAM TRACING: CPT | Performed by: STUDENT IN AN ORGANIZED HEALTH CARE EDUCATION/TRAINING PROGRAM

## 2024-07-02 PROCEDURE — 73552 X-RAY EXAM OF FEMUR 2/>: CPT

## 2024-07-02 PROCEDURE — 80307 DRUG TEST PRSMV CHEM ANLYZR: CPT

## 2024-07-02 PROCEDURE — 99285 EMERGENCY DEPT VISIT HI MDM: CPT

## 2024-07-02 PROCEDURE — 80179 DRUG ASSAY SALICYLATE: CPT

## 2024-07-02 PROCEDURE — 70450 CT HEAD/BRAIN W/O DYE: CPT

## 2024-07-02 PROCEDURE — 83735 ASSAY OF MAGNESIUM: CPT

## 2024-07-02 PROCEDURE — 73502 X-RAY EXAM HIP UNI 2-3 VIEWS: CPT

## 2024-07-02 PROCEDURE — 27250 TREAT HIP DISLOCATION: CPT

## 2024-07-02 PROCEDURE — G0480 DRUG TEST DEF 1-7 CLASSES: HCPCS

## 2024-07-02 PROCEDURE — 80053 COMPREHEN METABOLIC PANEL: CPT

## 2024-07-02 PROCEDURE — 96360 HYDRATION IV INFUSION INIT: CPT

## 2024-07-02 PROCEDURE — 80143 DRUG ASSAY ACETAMINOPHEN: CPT

## 2024-07-02 PROCEDURE — 2580000003 HC RX 258: Performed by: STUDENT IN AN ORGANIZED HEALTH CARE EDUCATION/TRAINING PROGRAM

## 2024-07-02 PROCEDURE — 85025 COMPLETE CBC W/AUTO DIFF WBC: CPT

## 2024-07-02 PROCEDURE — 84484 ASSAY OF TROPONIN QUANT: CPT

## 2024-07-02 PROCEDURE — 6370000000 HC RX 637 (ALT 250 FOR IP): Performed by: STUDENT IN AN ORGANIZED HEALTH CARE EDUCATION/TRAINING PROGRAM

## 2024-07-02 PROCEDURE — 72125 CT NECK SPINE W/O DYE: CPT

## 2024-07-02 RX ORDER — MIDAZOLAM HYDROCHLORIDE 2 MG/2ML
2 INJECTION, SOLUTION INTRAMUSCULAR; INTRAVENOUS ONCE
Status: COMPLETED | OUTPATIENT
Start: 2024-07-02 | End: 2024-07-02

## 2024-07-02 RX ORDER — 0.9 % SODIUM CHLORIDE 0.9 %
1000 INTRAVENOUS SOLUTION INTRAVENOUS ONCE
Status: COMPLETED | OUTPATIENT
Start: 2024-07-02 | End: 2024-07-03

## 2024-07-02 RX ORDER — FENTANYL CITRATE 50 UG/ML
100 INJECTION, SOLUTION INTRAMUSCULAR; INTRAVENOUS ONCE
Status: COMPLETED | OUTPATIENT
Start: 2024-07-03 | End: 2024-07-03

## 2024-07-02 RX ORDER — METOPROLOL TARTRATE 50 MG/1
50 TABLET, FILM COATED ORAL ONCE
Status: COMPLETED | OUTPATIENT
Start: 2024-07-02 | End: 2024-07-02

## 2024-07-02 RX ADMIN — MIDAZOLAM 2 MG: 1 INJECTION INTRAMUSCULAR; INTRAVENOUS at 23:13

## 2024-07-02 RX ADMIN — METOPROLOL TARTRATE 50 MG: 50 TABLET, FILM COATED ORAL at 22:39

## 2024-07-02 RX ADMIN — SODIUM CHLORIDE 1000 ML: 9 INJECTION, SOLUTION INTRAVENOUS at 23:03

## 2024-07-02 ASSESSMENT — LIFESTYLE VARIABLES
HOW OFTEN DO YOU HAVE A DRINK CONTAINING ALCOHOL: NEVER
HOW MANY STANDARD DRINKS CONTAINING ALCOHOL DO YOU HAVE ON A TYPICAL DAY: PATIENT DOES NOT DRINK

## 2024-07-02 ASSESSMENT — PAIN - FUNCTIONAL ASSESSMENT: PAIN_FUNCTIONAL_ASSESSMENT: 0-10

## 2024-07-03 ENCOUNTER — APPOINTMENT (OUTPATIENT)
Dept: GENERAL RADIOLOGY | Age: 64
End: 2024-07-03
Payer: MEDICARE

## 2024-07-03 VITALS
DIASTOLIC BLOOD PRESSURE: 80 MMHG | OXYGEN SATURATION: 95 % | HEIGHT: 75 IN | HEART RATE: 92 BPM | RESPIRATION RATE: 23 BRPM | WEIGHT: 220 LBS | TEMPERATURE: 97.7 F | BODY MASS INDEX: 27.35 KG/M2 | SYSTOLIC BLOOD PRESSURE: 130 MMHG

## 2024-07-03 LAB
APAP SERPL-MCNC: <5 UG/ML (ref 10–30)
EKG ATRIAL RATE: 129 BPM
EKG Q-T INTERVAL: 326 MS
EKG QRS DURATION: 80 MS
EKG QTC CALCULATION (BAZETT): 472 MS
EKG R AXIS: 66 DEGREES
EKG T AXIS: 84 DEGREES
EKG VENTRICULAR RATE: 126 BPM
ETHANOLAMINE SERPL-MCNC: 264 MG/DL (ref 0–0.08)
SALICYLATES SERPL-MCNC: <0.3 MG/DL (ref 0–30)
TOXIC TRICYCLIC SC,BLOOD: NEGATIVE

## 2024-07-03 PROCEDURE — 6360000002 HC RX W HCPCS: Performed by: STUDENT IN AN ORGANIZED HEALTH CARE EDUCATION/TRAINING PROGRAM

## 2024-07-03 PROCEDURE — 73502 X-RAY EXAM HIP UNI 2-3 VIEWS: CPT

## 2024-07-03 PROCEDURE — 93010 ELECTROCARDIOGRAM REPORT: CPT | Performed by: INTERNAL MEDICINE

## 2024-07-03 RX ORDER — DILTIAZEM HYDROCHLORIDE 5 MG/ML
10 INJECTION INTRAVENOUS ONCE
Status: DISCONTINUED | OUTPATIENT
Start: 2024-07-03 | End: 2024-07-03 | Stop reason: HOSPADM

## 2024-07-03 RX ORDER — MIDAZOLAM HYDROCHLORIDE 2 MG/2ML
4 INJECTION, SOLUTION INTRAMUSCULAR; INTRAVENOUS ONCE
Status: COMPLETED | OUTPATIENT
Start: 2024-07-03 | End: 2024-07-03

## 2024-07-03 RX ORDER — DILTIAZEM HYDROCHLORIDE 5 MG/ML
10 INJECTION INTRAVENOUS ONCE
Status: DISCONTINUED | OUTPATIENT
Start: 2024-07-03 | End: 2024-07-03

## 2024-07-03 RX ADMIN — FENTANYL CITRATE 100 MCG: 50 INJECTION INTRAMUSCULAR; INTRAVENOUS at 01:41

## 2024-07-03 RX ADMIN — MIDAZOLAM 4 MG: 1 INJECTION INTRAMUSCULAR; INTRAVENOUS at 01:41

## 2024-07-03 ASSESSMENT — PAIN DESCRIPTION - LOCATION: LOCATION: LEG

## 2024-07-03 ASSESSMENT — PAIN DESCRIPTION - ORIENTATION: ORIENTATION: RIGHT

## 2024-07-03 ASSESSMENT — PAIN DESCRIPTION - DESCRIPTORS: DESCRIPTORS: ACHING;SHOOTING

## 2024-07-03 ASSESSMENT — PAIN SCALES - GENERAL: PAINLEVEL_OUTOF10: 10

## 2024-07-03 NOTE — ED NOTES
Patient's sister Laura is unable to  this patient as she is at work, this RN attempted to contact Qian who did not answer the phone. Will try again shortly.

## 2024-07-03 NOTE — ED PROVIDER NOTES
[facility] EMERGENCY DEPARTMENT  EMERGENCY DEPARTMENT ENCOUNTER        Pt Name: Willem Kelley  MRN: [id number]  Birthdate 1960  Date of evaluation: 7/2/2024  Provider: Tika Moss MD  PCP: No primary care provider on file.  Note Started: 10:18 PM EDT 7/2/24    HPI  63 y.o. male presenting for fall.  Patient states he was trying to get out of bed to the bathroom when he fell.  He denies hitting his head or losing consciousness.  He does endorse alcohol use today, states he drank 3 beers.  On exam, patient is a poor historian.  He appears clinically intoxicated.  He knows knows to be in atrial fibrillation RVR, he does have a history of this.  He states he has not taken his metoprolol in 2 days.    --------------------------------------------- PAST HISTORY ---------------------------------------------  Past Medical History:  has a past medical history of Atrial fibrillation with RVR (H[facility]).    Past Surgical History:  has a past surgical history that includes Hip fracture surgery (Right); Leg Surgery (Right); knee surgery (Left); and laminectomy (Bilateral, 10/10/2023).    Social History:  reports that he has been smoking cigarettes. He has a 1.0 pack-year smoking history. He has been exposed to tobacco smoke. He has never used smokeless tobacco. He reports current alcohol use of about 3.0 standard drinks of alcohol per week. He reports that he does not use drugs.    Family History: family history is not on file.     The patient’s home medications have been reviewed.    Allergies: Morphine      Review of Systems   Unable to perform ROS: Other        Physical Exam  Constitutional:       General: He is not in acute distress.     Appearance: Normal appearance. He is not ill-appearing.   HENT:      Head: Normocephalic and atraumatic.      Right Ear: External ear normal.      Left Ear: External ear normal.      Nose: Nose normal.   Eyes:      Conjunctiva/sclera: Conjunctivae

## 2024-07-05 ENCOUNTER — TELEPHONE (OUTPATIENT)
Dept: ORTHOPEDIC SURGERY | Age: 64
End: 2024-07-05

## 2024-07-05 NOTE — TELEPHONE ENCOUNTER
Patient called office requesting appointment for ED follow up.    ED visit date: 7/2/2024    [place] [facility] [place]    Diagnosis/Injury     Dislocation of right hip     2-3 view xray right hip     FINDINGS:  Status post interval reduction of right hip dislocation.  Right hip  arthroplasty noted.  No acute displaced fracture is identified.  No focal  soft tissue abnormality.     IMPRESSION:  Status post interval reduction of right hip dislocation  Provider on call: Dr. Brent Esquivel DO    Routed to providers for recommendations.    No future appointments.

## 2024-07-12 DIAGNOSIS — S73.004D DISLOCATION OF RIGHT HIP, SUBSEQUENT ENCOUNTER: Primary | ICD-10-CM

## 2024-07-16 ENCOUNTER — TELEPHONE (OUTPATIENT)
Dept: ORTHOPEDIC SURGERY | Age: 64
End: 2024-07-16

## 2024-07-16 NOTE — TELEPHONE ENCOUNTER
Called patient and stated  that  review his chart and stated he would recommend to go to [facility]. Patient stated that he was not going

## 2025-04-16 ENCOUNTER — TRANSCRIBE ORDERS (OUTPATIENT)
Dept: ADMINISTRATIVE | Age: 65
End: 2025-04-16

## 2025-04-16 DIAGNOSIS — K70.30 ALCOHOLIC CIRRHOSIS OF LIVER WITHOUT ASCITES (HCC): Primary | ICD-10-CM

## 2025-04-22 ENCOUNTER — TELEPHONE (OUTPATIENT)
Dept: PHYSICAL THERAPY | Age: 65
End: 2025-04-22

## 2025-05-14 ENCOUNTER — HOSPITAL ENCOUNTER (OUTPATIENT)
Dept: ULTRASOUND IMAGING | Age: 65
Discharge: HOME | End: 2025-05-16
Payer: MEDICARE

## 2025-05-14 DIAGNOSIS — K70.30 ALCOHOLIC CIRRHOSIS OF LIVER WITHOUT ASCITES (HCC): ICD-10-CM

## 2025-05-14 PROCEDURE — 76981 USE PARENCHYMA: CPT

## 2025-06-05 ENCOUNTER — HOSPITAL ENCOUNTER (INPATIENT)
Age: 65
LOS: 2 days | Discharge: HOME | DRG: 603 | End: 2025-06-07
Attending: STUDENT IN AN ORGANIZED HEALTH CARE EDUCATION/TRAINING PROGRAM | Admitting: STUDENT IN AN ORGANIZED HEALTH CARE EDUCATION/TRAINING PROGRAM
Payer: OTHER GOVERNMENT

## 2025-06-05 ENCOUNTER — APPOINTMENT (OUTPATIENT)
Dept: CT IMAGING | Age: 65
DRG: 603 | End: 2025-06-05
Payer: OTHER GOVERNMENT

## 2025-06-05 ENCOUNTER — APPOINTMENT (OUTPATIENT)
Dept: ULTRASOUND IMAGING | Age: 65
DRG: 603 | End: 2025-06-05
Payer: OTHER GOVERNMENT

## 2025-06-05 DIAGNOSIS — R53.1 GENERAL WEAKNESS: ICD-10-CM

## 2025-06-05 DIAGNOSIS — E80.6 HYPERBILIRUBINEMIA: ICD-10-CM

## 2025-06-05 DIAGNOSIS — K70.30 ALCOHOLIC CIRRHOSIS, UNSPECIFIED WHETHER ASCITES PRESENT (HCC): ICD-10-CM

## 2025-06-05 DIAGNOSIS — R60.0 BILATERAL LOWER EXTREMITY EDEMA: ICD-10-CM

## 2025-06-05 DIAGNOSIS — L03.115 CELLULITIS OF RIGHT LOWER EXTREMITY: Primary | ICD-10-CM

## 2025-06-05 PROBLEM — R56.9 DRUG WITHDRAWAL SEIZURE WITH COMPLICATION (HCC): Status: ACTIVE | Noted: 2025-06-05

## 2025-06-05 PROBLEM — B18.2 CHRONIC HEPATITIS C WITHOUT HEPATIC COMA (HCC): Status: ACTIVE | Noted: 2025-06-05

## 2025-06-05 PROBLEM — F19.939 DRUG WITHDRAWAL SEIZURE WITH COMPLICATION (HCC): Status: ACTIVE | Noted: 2025-06-05

## 2025-06-05 LAB
ALBUMIN SERPL-MCNC: 3 G/DL (ref 3.5–5.2)
ALP SERPL-CCNC: 136 U/L (ref 40–129)
ALT SERPL-CCNC: 67 U/L (ref 0–40)
AMMONIA PLAS-SCNC: 20 UMOL/L (ref 16–60)
ANION GAP SERPL CALCULATED.3IONS-SCNC: 12 MMOL/L (ref 7–16)
APAP SERPL-MCNC: <5 UG/ML (ref 10–30)
AST SERPL-CCNC: 170 U/L (ref 0–39)
BASOPHILS # BLD: 0.05 K/UL (ref 0–0.2)
BASOPHILS NFR BLD: 1 % (ref 0–2)
BILIRUB DIRECT SERPL-MCNC: 4.3 MG/DL (ref 0–0.3)
BILIRUB INDIRECT SERPL-MCNC: 2.6 MG/DL (ref 0–1)
BILIRUB SERPL-MCNC: 6.9 MG/DL (ref 0–1.2)
BILIRUB SERPL-MCNC: 6.9 MG/DL (ref 0–1.2)
BNP SERPL-MCNC: 863 PG/ML (ref 0–125)
BUN SERPL-MCNC: 16 MG/DL (ref 6–23)
CALCIUM SERPL-MCNC: 9.2 MG/DL (ref 8.6–10.2)
CHLORIDE SERPL-SCNC: 98 MMOL/L (ref 98–107)
CO2 SERPL-SCNC: 28 MMOL/L (ref 22–29)
CREAT SERPL-MCNC: 0.8 MG/DL (ref 0.7–1.2)
CRP SERPL HS-MCNC: 6.5 MG/L (ref 0–5)
EKG ATRIAL RATE: 117 BPM
EKG Q-T INTERVAL: 428 MS
EKG QRS DURATION: 92 MS
EKG QTC CALCULATION (BAZETT): 490 MS
EKG R AXIS: 52 DEGREES
EKG T AXIS: 79 DEGREES
EKG VENTRICULAR RATE: 79 BPM
EOSINOPHIL # BLD: 0.04 K/UL (ref 0.05–0.5)
EOSINOPHILS RELATIVE PERCENT: 1 % (ref 0–6)
ERYTHROCYTE [DISTWIDTH] IN BLOOD BY AUTOMATED COUNT: 16.8 % (ref 11.5–15)
ERYTHROCYTE [SEDIMENTATION RATE] IN BLOOD BY WESTERGREN METHOD: 1 MM/HR (ref 0–15)
ETHANOLAMINE SERPL-MCNC: <10 MG/DL (ref 0–0.08)
GFR, ESTIMATED: >90 ML/MIN/1.73M2
GLUCOSE BLD-MCNC: 176 MG/DL (ref 74–99)
GLUCOSE SERPL-MCNC: 84 MG/DL (ref 74–99)
HCT VFR BLD AUTO: 38.2 % (ref 37–54)
HGB BLD-MCNC: 13.7 G/DL (ref 12.5–16.5)
IMM GRANULOCYTES # BLD AUTO: <0.03 K/UL (ref 0–0.58)
IMM GRANULOCYTES NFR BLD: 0 % (ref 0–5)
LYMPHOCYTES NFR BLD: 1.41 K/UL (ref 1.5–4)
LYMPHOCYTES RELATIVE PERCENT: 24 % (ref 20–42)
MAGNESIUM SERPL-MCNC: 1.9 MG/DL (ref 1.6–2.6)
MCH RBC QN AUTO: 38.1 PG (ref 26–35)
MCHC RBC AUTO-ENTMCNC: 35.9 G/DL (ref 32–34.5)
MCV RBC AUTO: 106.1 FL (ref 80–99.9)
MONOCYTES NFR BLD: 0.77 K/UL (ref 0.1–0.95)
MONOCYTES NFR BLD: 13 % (ref 2–12)
NEUTROPHILS NFR BLD: 61 % (ref 43–80)
NEUTS SEG NFR BLD: 3.59 K/UL (ref 1.8–7.3)
PLATELET CONFIRMATION: NORMAL
PLATELET, FLUORESCENCE: 67 K/UL (ref 130–450)
PMV BLD AUTO: 11.1 FL (ref 7–12)
POTASSIUM SERPL-SCNC: 3.3 MMOL/L (ref 3.5–5)
PROT SERPL-MCNC: 6.1 G/DL (ref 6.4–8.3)
RBC # BLD AUTO: 3.6 M/UL (ref 3.8–5.8)
SALICYLATES SERPL-MCNC: <0.3 MG/DL (ref 0–30)
SODIUM SERPL-SCNC: 138 MMOL/L (ref 132–146)
TOXIC TRICYCLIC SC,BLOOD: NEGATIVE
TROPONIN I SERPL HS-MCNC: 51 NG/L (ref 0–22)
TROPONIN I SERPL HS-MCNC: 52 NG/L (ref 0–22)
WBC OTHER # BLD: 5.9 K/UL (ref 4.5–11.5)

## 2025-06-05 PROCEDURE — 80307 DRUG TEST PRSMV CHEM ANLYZR: CPT

## 2025-06-05 PROCEDURE — 93010 ELECTROCARDIOGRAM REPORT: CPT | Performed by: INTERNAL MEDICINE

## 2025-06-05 PROCEDURE — 82140 ASSAY OF AMMONIA: CPT

## 2025-06-05 PROCEDURE — 99285 EMERGENCY DEPT VISIT HI MDM: CPT

## 2025-06-05 PROCEDURE — 80053 COMPREHEN METABOLIC PANEL: CPT

## 2025-06-05 PROCEDURE — 99222 1ST HOSP IP/OBS MODERATE 55: CPT | Performed by: STUDENT IN AN ORGANIZED HEALTH CARE EDUCATION/TRAINING PROGRAM

## 2025-06-05 PROCEDURE — 99291 CRITICAL CARE FIRST HOUR: CPT | Performed by: NURSE PRACTITIONER

## 2025-06-05 PROCEDURE — 6360000004 HC RX CONTRAST MEDICATION: Performed by: RADIOLOGY

## 2025-06-05 PROCEDURE — 85025 COMPLETE CBC W/AUTO DIFF WBC: CPT

## 2025-06-05 PROCEDURE — 93005 ELECTROCARDIOGRAM TRACING: CPT | Performed by: STUDENT IN AN ORGANIZED HEALTH CARE EDUCATION/TRAINING PROGRAM

## 2025-06-05 PROCEDURE — 2500000003 HC RX 250 WO HCPCS: Performed by: STUDENT IN AN ORGANIZED HEALTH CARE EDUCATION/TRAINING PROGRAM

## 2025-06-05 PROCEDURE — 6360000002 HC RX W HCPCS

## 2025-06-05 PROCEDURE — 6370000000 HC RX 637 (ALT 250 FOR IP)

## 2025-06-05 PROCEDURE — 2500000003 HC RX 250 WO HCPCS

## 2025-06-05 PROCEDURE — 80179 DRUG ASSAY SALICYLATE: CPT

## 2025-06-05 PROCEDURE — 96375 TX/PRO/DX INJ NEW DRUG ADDON: CPT

## 2025-06-05 PROCEDURE — 74177 CT ABD & PELVIS W/CONTRAST: CPT

## 2025-06-05 PROCEDURE — 84484 ASSAY OF TROPONIN QUANT: CPT

## 2025-06-05 PROCEDURE — 80143 DRUG ASSAY ACETAMINOPHEN: CPT

## 2025-06-05 PROCEDURE — 6370000000 HC RX 637 (ALT 250 FOR IP): Performed by: EMERGENCY MEDICINE

## 2025-06-05 PROCEDURE — 96365 THER/PROPH/DIAG IV INF INIT: CPT

## 2025-06-05 PROCEDURE — 2580000003 HC RX 258: Performed by: STUDENT IN AN ORGANIZED HEALTH CARE EDUCATION/TRAINING PROGRAM

## 2025-06-05 PROCEDURE — 83735 ASSAY OF MAGNESIUM: CPT

## 2025-06-05 PROCEDURE — G0480 DRUG TEST DEF 1-7 CLASSES: HCPCS

## 2025-06-05 PROCEDURE — 83880 ASSAY OF NATRIURETIC PEPTIDE: CPT

## 2025-06-05 PROCEDURE — 6370000000 HC RX 637 (ALT 250 FOR IP): Performed by: STUDENT IN AN ORGANIZED HEALTH CARE EDUCATION/TRAINING PROGRAM

## 2025-06-05 PROCEDURE — 93970 EXTREMITY STUDY: CPT

## 2025-06-05 PROCEDURE — 82962 GLUCOSE BLOOD TEST: CPT

## 2025-06-05 PROCEDURE — 82248 BILIRUBIN DIRECT: CPT

## 2025-06-05 PROCEDURE — 87081 CULTURE SCREEN ONLY: CPT

## 2025-06-05 PROCEDURE — 6360000002 HC RX W HCPCS: Performed by: STUDENT IN AN ORGANIZED HEALTH CARE EDUCATION/TRAINING PROGRAM

## 2025-06-05 PROCEDURE — 2000000000 HC ICU R&B

## 2025-06-05 PROCEDURE — 85652 RBC SED RATE AUTOMATED: CPT

## 2025-06-05 PROCEDURE — 86140 C-REACTIVE PROTEIN: CPT

## 2025-06-05 PROCEDURE — 6360000002 HC RX W HCPCS: Performed by: EMERGENCY MEDICINE

## 2025-06-05 PROCEDURE — 96366 THER/PROPH/DIAG IV INF ADDON: CPT

## 2025-06-05 PROCEDURE — 99291 CRITICAL CARE FIRST HOUR: CPT | Performed by: STUDENT IN AN ORGANIZED HEALTH CARE EDUCATION/TRAINING PROGRAM

## 2025-06-05 RX ORDER — LYSINE HCL 500 MG
1 TABLET ORAL EVERY MORNING
COMMUNITY

## 2025-06-05 RX ORDER — MIDAZOLAM HYDROCHLORIDE 2 MG/2ML
2 INJECTION, SOLUTION INTRAMUSCULAR; INTRAVENOUS ONCE
Status: COMPLETED | OUTPATIENT
Start: 2025-06-05 | End: 2025-06-05

## 2025-06-05 RX ORDER — SODIUM CHLORIDE 0.9 % (FLUSH) 0.9 %
5-40 SYRINGE (ML) INJECTION EVERY 12 HOURS SCHEDULED
Status: DISCONTINUED | OUTPATIENT
Start: 2025-06-05 | End: 2025-06-07 | Stop reason: HOSPADM

## 2025-06-05 RX ORDER — TRAZODONE HYDROCHLORIDE 50 MG/1
100 TABLET ORAL NIGHTLY PRN
Status: DISCONTINUED | OUTPATIENT
Start: 2025-06-05 | End: 2025-06-07 | Stop reason: HOSPADM

## 2025-06-05 RX ORDER — ONDANSETRON 2 MG/ML
4 INJECTION INTRAMUSCULAR; INTRAVENOUS EVERY 6 HOURS PRN
Status: DISCONTINUED | OUTPATIENT
Start: 2025-06-05 | End: 2025-06-07 | Stop reason: HOSPADM

## 2025-06-05 RX ORDER — ASCORBIC ACID 500 MG
500 TABLET ORAL EVERY MORNING
Status: DISCONTINUED | OUTPATIENT
Start: 2025-06-06 | End: 2025-06-07 | Stop reason: HOSPADM

## 2025-06-05 RX ORDER — MIDAZOLAM HYDROCHLORIDE 1 MG/ML
INJECTION, SOLUTION INTRAMUSCULAR; INTRAVENOUS
Status: COMPLETED
Start: 2025-06-05 | End: 2025-06-05

## 2025-06-05 RX ORDER — FUROSEMIDE 10 MG/ML
20 INJECTION INTRAMUSCULAR; INTRAVENOUS DAILY
Status: DISCONTINUED | OUTPATIENT
Start: 2025-06-06 | End: 2025-06-07 | Stop reason: HOSPADM

## 2025-06-05 RX ORDER — SILDENAFIL 100 MG/1
100 TABLET, FILM COATED ORAL PRN
COMMUNITY

## 2025-06-05 RX ORDER — LORAZEPAM 1 MG/1
2 TABLET ORAL
Status: DISCONTINUED | OUTPATIENT
Start: 2025-06-05 | End: 2025-06-07 | Stop reason: HOSPADM

## 2025-06-05 RX ORDER — SODIUM CHLORIDE 0.9 % (FLUSH) 0.9 %
5-40 SYRINGE (ML) INJECTION PRN
Status: DISCONTINUED | OUTPATIENT
Start: 2025-06-05 | End: 2025-06-07 | Stop reason: HOSPADM

## 2025-06-05 RX ORDER — ZINC SULFATE 50(220)MG
50 CAPSULE ORAL EVERY MORNING
COMMUNITY
End: 2025-06-05 | Stop reason: ALTCHOICE

## 2025-06-05 RX ORDER — LORAZEPAM 1 MG/1
1 TABLET ORAL
Status: DISCONTINUED | OUTPATIENT
Start: 2025-06-05 | End: 2025-06-07 | Stop reason: HOSPADM

## 2025-06-05 RX ORDER — METOPROLOL TARTRATE 50 MG
50 TABLET ORAL DAILY PRN
COMMUNITY

## 2025-06-05 RX ORDER — PROBENECID AND COLCHICINE .5; 5 MG/1; MG/1
1 TABLET ORAL 2 TIMES DAILY
COMMUNITY
End: 2025-06-05 | Stop reason: ALTCHOICE

## 2025-06-05 RX ORDER — ENOXAPARIN SODIUM 100 MG/ML
30 INJECTION SUBCUTANEOUS 2 TIMES DAILY
Status: DISCONTINUED | OUTPATIENT
Start: 2025-06-05 | End: 2025-06-05

## 2025-06-05 RX ORDER — MAGNESIUM SULFATE IN WATER 40 MG/ML
2000 INJECTION, SOLUTION INTRAVENOUS PRN
Status: DISCONTINUED | OUTPATIENT
Start: 2025-06-05 | End: 2025-06-07

## 2025-06-05 RX ORDER — FUROSEMIDE 10 MG/ML
40 INJECTION INTRAMUSCULAR; INTRAVENOUS ONCE
Status: COMPLETED | OUTPATIENT
Start: 2025-06-05 | End: 2025-06-05

## 2025-06-05 RX ORDER — METOPROLOL TARTRATE 50 MG
50 TABLET ORAL 2 TIMES DAILY
Status: DISCONTINUED | OUTPATIENT
Start: 2025-06-05 | End: 2025-06-05

## 2025-06-05 RX ORDER — MAGNESIUM OXIDE 420 MG/1
420 TABLET ORAL EVERY MORNING
COMMUNITY

## 2025-06-05 RX ORDER — SODIUM CHLORIDE 9 MG/ML
INJECTION, SOLUTION INTRAVENOUS PRN
Status: DISCONTINUED | OUTPATIENT
Start: 2025-06-05 | End: 2025-06-07 | Stop reason: HOSPADM

## 2025-06-05 RX ORDER — ASCORBIC ACID 500 MG
500 TABLET ORAL EVERY MORNING
COMMUNITY

## 2025-06-05 RX ORDER — LORAZEPAM 1 MG/1
3 TABLET ORAL
Status: DISCONTINUED | OUTPATIENT
Start: 2025-06-05 | End: 2025-06-07 | Stop reason: HOSPADM

## 2025-06-05 RX ORDER — METOPROLOL TARTRATE 50 MG
50 TABLET ORAL 2 TIMES DAILY
Status: DISCONTINUED | OUTPATIENT
Start: 2025-06-05 | End: 2025-06-07 | Stop reason: HOSPADM

## 2025-06-05 RX ORDER — POTASSIUM CHLORIDE 7.45 MG/ML
10 INJECTION INTRAVENOUS PRN
Status: DISCONTINUED | OUTPATIENT
Start: 2025-06-05 | End: 2025-06-07 | Stop reason: HOSPADM

## 2025-06-05 RX ORDER — POTASSIUM CHLORIDE 750 MG/1
10 TABLET, EXTENDED RELEASE ORAL EVERY MORNING
Status: DISCONTINUED | OUTPATIENT
Start: 2025-06-06 | End: 2025-06-07 | Stop reason: HOSPADM

## 2025-06-05 RX ORDER — ONDANSETRON 4 MG/1
4 TABLET, ORALLY DISINTEGRATING ORAL EVERY 8 HOURS PRN
Status: DISCONTINUED | OUTPATIENT
Start: 2025-06-05 | End: 2025-06-07 | Stop reason: HOSPADM

## 2025-06-05 RX ORDER — TRAZODONE HYDROCHLORIDE 100 MG/1
100 TABLET ORAL NIGHTLY PRN
COMMUNITY

## 2025-06-05 RX ORDER — POTASSIUM CHLORIDE 1500 MG/1
40 TABLET, EXTENDED RELEASE ORAL ONCE
Status: COMPLETED | OUTPATIENT
Start: 2025-06-05 | End: 2025-06-05

## 2025-06-05 RX ORDER — PHENOBARBITAL SODIUM 65 MG/ML
65 INJECTION, SOLUTION INTRAMUSCULAR; INTRAVENOUS EVERY 8 HOURS SCHEDULED
Status: DISCONTINUED | OUTPATIENT
Start: 2025-06-06 | End: 2025-06-06

## 2025-06-05 RX ORDER — CHOLECALCIFEROL (VITAMIN D3) 50 MCG
2000 TABLET ORAL EVERY MORNING
Status: DISCONTINUED | OUTPATIENT
Start: 2025-06-06 | End: 2025-06-07 | Stop reason: HOSPADM

## 2025-06-05 RX ORDER — LORAZEPAM 1 MG/1
4 TABLET ORAL
Status: DISCONTINUED | OUTPATIENT
Start: 2025-06-05 | End: 2025-06-07 | Stop reason: HOSPADM

## 2025-06-05 RX ORDER — FOLIC ACID 1 MG/1
1 TABLET ORAL EVERY MORNING
COMMUNITY

## 2025-06-05 RX ORDER — POLYETHYLENE GLYCOL 3350 17 G/17G
17 POWDER, FOR SOLUTION ORAL DAILY PRN
Status: DISCONTINUED | OUTPATIENT
Start: 2025-06-05 | End: 2025-06-07 | Stop reason: HOSPADM

## 2025-06-05 RX ORDER — ACETAMINOPHEN 325 MG/1
650 TABLET ORAL EVERY 6 HOURS PRN
Status: DISCONTINUED | OUTPATIENT
Start: 2025-06-05 | End: 2025-06-07 | Stop reason: HOSPADM

## 2025-06-05 RX ORDER — LANOLIN ALCOHOL/MO/W.PET/CERES
420 CREAM (GRAM) TOPICAL EVERY MORNING
Status: DISCONTINUED | OUTPATIENT
Start: 2025-06-06 | End: 2025-06-07 | Stop reason: HOSPADM

## 2025-06-05 RX ORDER — LANOLIN ALCOHOL/MO/W.PET/CERES
100 CREAM (GRAM) TOPICAL DAILY
Status: DISCONTINUED | OUTPATIENT
Start: 2025-06-05 | End: 2025-06-07 | Stop reason: HOSPADM

## 2025-06-05 RX ORDER — PHENOBARBITAL SODIUM 65 MG/ML
130 INJECTION, SOLUTION INTRAMUSCULAR; INTRAVENOUS ONCE
Status: COMPLETED | OUTPATIENT
Start: 2025-06-05 | End: 2025-06-05

## 2025-06-05 RX ORDER — GABAPENTIN 300 MG/1
300 CAPSULE ORAL 2 TIMES DAILY
Status: DISCONTINUED | OUTPATIENT
Start: 2025-06-05 | End: 2025-06-07 | Stop reason: HOSPADM

## 2025-06-05 RX ORDER — ACETAMINOPHEN 650 MG/1
650 SUPPOSITORY RECTAL EVERY 6 HOURS PRN
Status: DISCONTINUED | OUTPATIENT
Start: 2025-06-05 | End: 2025-06-07 | Stop reason: HOSPADM

## 2025-06-05 RX ORDER — IOPAMIDOL 755 MG/ML
75 INJECTION, SOLUTION INTRAVASCULAR
Status: COMPLETED | OUTPATIENT
Start: 2025-06-05 | End: 2025-06-05

## 2025-06-05 RX ORDER — FOLIC ACID 1 MG/1
1 TABLET ORAL EVERY MORNING
Status: DISCONTINUED | OUTPATIENT
Start: 2025-06-06 | End: 2025-06-07 | Stop reason: HOSPADM

## 2025-06-05 RX ORDER — POTASSIUM CHLORIDE 1500 MG/1
40 TABLET, EXTENDED RELEASE ORAL PRN
Status: DISCONTINUED | OUTPATIENT
Start: 2025-06-05 | End: 2025-06-07 | Stop reason: HOSPADM

## 2025-06-05 RX ADMIN — SODIUM CHLORIDE, PRESERVATIVE FREE 10 ML: 5 INJECTION INTRAVENOUS at 20:50

## 2025-06-05 RX ADMIN — SODIUM CHLORIDE, PRESERVATIVE FREE 10 ML: 5 INJECTION INTRAVENOUS at 14:42

## 2025-06-05 RX ADMIN — WATER 2000 MG: 1 INJECTION INTRAMUSCULAR; INTRAVENOUS; SUBCUTANEOUS at 21:49

## 2025-06-05 RX ADMIN — MIDAZOLAM HYDROCHLORIDE 2 MG: 1 INJECTION, SOLUTION INTRAMUSCULAR; INTRAVENOUS at 21:49

## 2025-06-05 RX ADMIN — METOPROLOL TARTRATE 50 MG: 50 TABLET, FILM COATED ORAL at 17:57

## 2025-06-05 RX ADMIN — ENOXAPARIN SODIUM 30 MG: 100 INJECTION SUBCUTANEOUS at 11:55

## 2025-06-05 RX ADMIN — SODIUM CHLORIDE, PRESERVATIVE FREE 10 ML: 5 INJECTION INTRAVENOUS at 11:55

## 2025-06-05 RX ADMIN — WATER 2000 MG: 1 INJECTION INTRAMUSCULAR; INTRAVENOUS; SUBCUTANEOUS at 07:00

## 2025-06-05 RX ADMIN — WATER 2000 MG: 1 INJECTION INTRAMUSCULAR; INTRAVENOUS; SUBCUTANEOUS at 14:42

## 2025-06-05 RX ADMIN — IOPAMIDOL 75 ML: 755 INJECTION, SOLUTION INTRAVENOUS at 08:49

## 2025-06-05 RX ADMIN — DOXYCYCLINE 100 MG: 100 INJECTION, POWDER, LYOPHILIZED, FOR SOLUTION INTRAVENOUS at 07:02

## 2025-06-05 RX ADMIN — MIDAZOLAM HYDROCHLORIDE 2 MG: 1 INJECTION, SOLUTION INTRAMUSCULAR; INTRAVENOUS at 18:51

## 2025-06-05 RX ADMIN — PHENOBARBITAL SODIUM 130 MG: 65 INJECTION INTRAMUSCULAR; INTRAVENOUS at 21:41

## 2025-06-05 RX ADMIN — GABAPENTIN 300 MG: 300 CAPSULE ORAL at 20:48

## 2025-06-05 RX ADMIN — MIDAZOLAM HYDROCHLORIDE 2 MG: 1 INJECTION, SOLUTION INTRAMUSCULAR; INTRAVENOUS at 18:25

## 2025-06-05 RX ADMIN — APIXABAN 5 MG: 5 TABLET, FILM COATED ORAL at 20:48

## 2025-06-05 RX ADMIN — FUROSEMIDE 40 MG: 10 INJECTION, SOLUTION INTRAMUSCULAR; INTRAVENOUS at 11:55

## 2025-06-05 RX ADMIN — Medication 100 MG: at 14:42

## 2025-06-05 RX ADMIN — ONDANSETRON 4 MG: 2 INJECTION, SOLUTION INTRAMUSCULAR; INTRAVENOUS at 18:54

## 2025-06-05 RX ADMIN — POTASSIUM CHLORIDE 40 MEQ: 1500 TABLET, EXTENDED RELEASE ORAL at 11:54

## 2025-06-05 ASSESSMENT — ENCOUNTER SYMPTOMS
COUGH: 0
SHORTNESS OF BREATH: 0
BACK PAIN: 0
COLOR CHANGE: 1
DIARRHEA: 0
ABDOMINAL PAIN: 0
NAUSEA: 0
VOMITING: 0

## 2025-06-05 ASSESSMENT — PAIN DESCRIPTION - DESCRIPTORS: DESCRIPTORS: ACHING;SHARP;SHOOTING

## 2025-06-05 ASSESSMENT — PAIN DESCRIPTION - LOCATION: LOCATION: FOOT

## 2025-06-05 ASSESSMENT — PAIN SCALES - GENERAL: PAINLEVEL_OUTOF10: 6

## 2025-06-05 ASSESSMENT — LIFESTYLE VARIABLES
HOW MANY STANDARD DRINKS CONTAINING ALCOHOL DO YOU HAVE ON A TYPICAL DAY: 5 OR 6
HOW OFTEN DO YOU HAVE A DRINK CONTAINING ALCOHOL: 4 OR MORE TIMES A WEEK

## 2025-06-05 ASSESSMENT — PAIN DESCRIPTION - ORIENTATION: ORIENTATION: RIGHT;LEFT

## 2025-06-05 ASSESSMENT — PAIN - FUNCTIONAL ASSESSMENT
PAIN_FUNCTIONAL_ASSESSMENT: 0-10
PAIN_FUNCTIONAL_ASSESSMENT: PREVENTS OR INTERFERES SOME ACTIVE ACTIVITIES AND ADLS

## 2025-06-05 NOTE — PROGRESS NOTES
Patient HR 150s - afib RVR. Vitals obtained & stable. No complaints, no chest pain. HR now sustaining 110-120s. Perfect serve to Dr Camacho.

## 2025-06-05 NOTE — PROGRESS NOTES
Spiritual Health History and Assessment/Progress Note  [facility]    Crisis, Rapid Response,  ,      Name: Willem Kelley MRN: [id number]    Age: 64 y.o.     Sex: male   Language: English   Judaism: Islam   Cellulitis of right lower leg     Date: 6/5/2025                           Spiritual Assessment began in [facility] 2 ICU        Referral/Consult From: Multi-disciplinary team   Encounter Overview/Reason: Crisis  Service Provided For: Patient    Vika, Belief, Meaning:   Patient unable to assess at this time  Family/Friends No family/friends present      Importance and Influence:  Patient unable to assess at this time  Family/Friends No family/friends present    Community:  Patient Other: Rapid Response  Family/Friends No family/friends present    Assessment and Plan of Care:     Patient Interventions include: Rapid Response  Family/Friends Interventions include: No family/friends present    Patient Plan of Care: Spiritual Care available upon further referral  Family/Friends Plan of Care: No family/friends present    Electronically signed by Chaplain Jayro on 6/5/2025 at 7:58 PM

## 2025-06-05 NOTE — ED NOTES
ED to Inpatient Handoff Report    Attempted to nitfy 5s at 1346 that electronic handoff available and patient ready for transport to room 531.    Safety Risks: Difficulty with daily activities and Risk of falls    Patient in Restraints: no    Constant Observer or Patient : no    Telemetry Monitoring Ordered :No           Order to transfer to unit without monitor:N/A    Last MEWS: 1 Time completed: 1301    Deterioration Index Score:   Predictive Model Details          28 (Normal)  Factor Value    Calculated 6/5/2025 13:48 51% Respiratory rate 10    Deterioration Index Model 39% Age 64 years old     4% Pulse 94     3% Potassium abnormal (3.3 mmol/L)     2% Systolic 107     1% Sodium 138 mmol/L     0% Temperature 97.5 °F (36.4 °C)     0% WBC count 5.9 k/uL     0% Hematocrit 38.2 %     0% Pulse oximetry 96 %        Vitals:    06/05/25 1044 06/05/25 1158 06/05/25 1201 06/05/25 1301   BP:  113/71 113/71 107/65   Pulse: 86 95 92 94   Resp: 15 17 16 10   Temp:  97.5 °F (36.4 °C)     TempSrc:  Oral     SpO2: 99% 97% 96% 96%   Weight:       Height:             Opportunity for questions and clarification was provided.

## 2025-06-05 NOTE — CONSULTS
[facility] Consult Note   JUNO Gore-NP-C with Carl Sharma D.O. Consult Note      Date of Service: 6/6/2025  Reason for Consult: alcoholic liver cirrhosis   Requesting Physician: Tyra FRAIRE CNP    CHIEF COMPLAINT:  BLE swelling     History Obtained From:  patient, electronic medical record    HISTORY OF PRESENT ILLNESS:    Willem Kelley is a 64 y.o. male with significant past medical history of A Fib on Eliquis, alcoholic cirrhosis, chronic hepatitis C, ED, gout, OA and UC presenting to ED for BLE swelling and admitted with RLE cellulitis.  Pt reports he came to ER due to bilateral leg swelling and pain in right leg. Patient reports he is on Lasix and typically has good relief. Patient felt to be a poor historian and denies knowing history of cirrhosis and hepatitis C. [facility], \"they don't tell me anything\". He does report chronic EtOH use, tobacco use and marijuana use for bilateral hip and back pain. Patient states last drink was 3 to 4 days go, had previous stopped drinking and smoking for lent. Patient denies any nausea, vomiting or abdominal pain. His last BM was yesterday prior to coming to the hospital. He states his BM's are brown and formed, denies diarrhea, melena, or hematochezia. [facility], had recent upper and lower scopes within the past couple years. No reports to confirm.     Admission labs: K 3.3, , ALT 67, , TB 6.9, DB 4.3, IB 2.6, platelet 67. Imaging: CT abdomen pelvis W IV contrast- 1. Short segment of wall thickening identified of the sigmoid colon. Adjacent surrounding diverticulum. Mild acute sigmoid diverticulitis cannot be completely excluded. No significant surrounding stranding. 2. Increased stool burden seen diffusely throughout the colon to suggest clinical presentation of constipation. 3. Heterogeneous appearance of the liver with several lesions and  areas of increased density again seen within the right lobe which are similar in appearance when compared to the prior examination. There is again a underlying cirrhotic morphology identified of the liver. Consider MRI with and without contrast for further evaluation of the liver lesions on a nonemergent basis.  No biliary ductal dilatation. 4. Stable angiomyolipoma identified on the left kidney. US Elastography- 1.  Increased, coarse hepatic echotexture consistent with cirrhosis.  Right hepatic mass measuring 2.8 cm.  Correlation with elevated liver enzymes is recommended. 2. F4 - suggestive of cirrhotic changes. Absent or mild fibrosis F1 5.48 kPa - 8.29 kPa 1.35 m/s - 1.66 m/s Significant fibrosis F2 8.29 kPa - 9.40 kPa 1.66 m/s - 1.77 m/s Severe fibrosis F3 9.40 kPa - 11.9 kPa 1.77 m/s - 1.99 m/s Cirrhosis F4 > 11.9 kPa > 1.99 m/s. Labs today: K 3.3, , ALT 55, , TB 4.8, DB 3.1, IB 1.7, Platelets 77     Consultation for alcoholic liver cirrhosis. Currently, pt reports no nausea, vomiting, abdominal pain. No diarrhea, melena, hematochezia. Last BM yesterday prior to coming in, formed and brown.     Past Medical History:        Diagnosis Date    Atrial fibrillation with RVR (HCC)      Past Surgical History:        Procedure Laterality Date    HIP FRACTURE SURGERY Right     Rt Hip. Operated on 4 times.    KNEE SURGERY Left     LAMINECTOMY Bilateral 10/10/2023    L3-L5 LUMBAR LAMINECTOMY POSTERIOR, L4-L5 DISK BIOPSY performed by Jose R Berkowitz MD at [facility] [place]    LEG SURGERY Right      Current Medications:    Current Facility-Administered Medications: pantoprazole (PROTONIX) tablet 40 mg, 40 mg, Oral, QAM AC  docusate sodium (COLACE) capsule 100 mg, 100 mg, Oral, BID  sodium chloride flush 0.9 % injection 5-40 mL, 5-40 mL, IntraVENous, 2 times per day  sodium chloride flush 0.9 % injection 5-40 mL, 5-40 mL, IntraVENous, PRN  0.9 % sodium chloride infusion, , IntraVENous, PRN  potassium chloride (KLOR-CON M)  Increased stool burden seen diffusely throughout the colon to suggest clinical presentation of constipation. 3. Heterogeneous appearance of the liver with several lesions and areas of increased density again seen within the right lobe which are similar in appearance when compared to the prior examination. There is again a underlying cirrhotic morphology identified of the liver. Consider MRI with and without contrast for further evaluation of the liver lesions on a nonemergent basis.  No biliary ductal dilatation. 4. Stable angiomyolipoma identified on the left kidney.     US ORGAN ELASTOGRAPHY  Result Date: 5/14/2025  EXAMINATION: LIVER ELASTOGRAPHY ULTRASOUND 5/14/2025 9:20 am TECHNIQUE: Multiple transabdominal sonographic images of the liver were obtained with gray scale and color flow imaging. Focal area of elastography was performed. COMPARISON: None. HISTORY: ORDERING SYSTEM PROVIDED HISTORY: Alcoholic cirrhosis of liver without ascites (HCC) TECHNOLOGIST PROVIDED HISTORY: What reading provider will be dictating this exam?->CRC FINDINGS: The liver measures 16.9 cm in length and demonstrates increased, coarse echotexture without intrahepatic biliary dilatation.  There is a 2.8 x 2.8 x 2.8 mildly heterogeneous isoechoic right hepatic mass. Elastography was performed of the right hepatic lobe utilizing [employer] software. Twelve samples were obtained. Median: 13.00kPa IQR is 2.70kPa     1.  Increased, coarse hepatic echotexture consistent with cirrhosis.  Right hepatic mass measuring 2.8 cm.  Correlation with elevated liver enzymes is recommended. 2. F4 - suggestive of cirrhotic changes. Absent or mild fibrosis F1 5.48 kPa - 8.29 kPa 1.35 m/s - 1.66 m/s Significant fibrosis F2 8.29 kPa - 9.40 kPa 1.66 m/s - 1.77 m/s Severe fibrosis F3 9.40 kPa - 11.9 kPa 1.77 m/s - 1.99 m/s Cirrhosis F4 > 11.9 kPa > 1.99 m/s       IMPRESSION:  Alcoholic/Hepatitis C liver cirrhosis   EtOH DI unable to calculate, no PT  MELD unable to  calculate, no INR  Hx hepatitis C  Elevated liver enzymes  Liver lesion   Hyperbilirubinemia  Thrombocytopenia   RLE cellulitis- defer  A Fib on Eliquis      RECOMMENDATIONS:    Hepatitis Panel  MARGIE ordered  AFP tumor marker ordered  PT/INR ordered  Iron studies and lipid profile ordered  PERALES fibrosure ordered  PETH level ordered  Alcohol cessation  Pantoprazole 40 mg daily  Diet as tolerated   CIWA per primary  Trend labs  Supportive care  Will follow    Note: This report was completed utilizing computer voice recognition software. Every effort has been made to ensure accuracy, however; inadvertent computerized transcription errors may be present.     Thank you very much for your consultation. We will follow closely with you.    Discussed with Dr. Sharma  Treatment plan developed by Dr. Zachary Nunez, APRN-NP-C 6/6/2025 12:44 PM for Dr. Sharma      GI Attending Addendum:  The patient was seen and examined independently from the midlevel team. The case was discuss with the team and the above assessment and plan was developed.  Carl Sharma, DO

## 2025-06-05 NOTE — PROGRESS NOTES
Database initiated. Patient is A&O comes in from home with GF. States he uses a cane and a walker and is RA at baseline. He is having diarrhea.

## 2025-06-05 NOTE — PROGRESS NOTES
Attempted to call Qian, patient sister. No answer - Left message. Call to Laura and update provided.

## 2025-06-05 NOTE — ED PROVIDER NOTES
[facility] EMERGENCY DEPARTMENT  EMERGENCY DEPARTMENT ENCOUNTER        Pt Name: Willem Kelley  MRN: [id number]  Birthdate 1960  Date of evaluation: 6/5/2025  Provider: Ysabel Sams DO  PCP: No primary care provider on file.  Note Started: 5:31 AM EDT 6/5/25    CHIEF COMPLAINT       Chief Complaint   Patient presents with    Leg Swelling       HISTORY OF PRESENT ILLNESS: 1 or more Elements     Limitations to history : None    Willem Kelley is a 64 y.o. male with past medical history of paroxysmal atrial fibrillation (used to be on Eliquis 5 mg twice daily but patient states he no longer has A-fib?), history of alcoholic cirrhosis of the liver, who presents to the emergency department for evaluation of bilateral leg swelling, worse on the right; patient states that he chronically has issues with leg swelling but over the past few days the right leg has seemed worse.  States the right leg is painful red and warm as well.  Denies any open wounds anywhere.  He has not had any fevers or chills.  Denies any chest pain palpitations or shortness of breath.  Denies any abdominal pain nausea vomiting diarrhea or black or bloody stools.  Patient is felt to overall be a poor historian; [facility] which [facility].  He does have a history of chronic hepatitis C which I was able to gather from care everywhere documentation.        Nursing Notes were all reviewed and agreed with or any disagreements were addressed in the HPI.      REVIEW OF EXTERNAL NOTE :       Reviewed [facility] from [facility], 3/28/2025.    Chart Review/External Note Review    Last Echo reviewed by Me:  No results found for: \"LVEF\", \"LVEFMODE\"        Controlled Substance Monitoring:    Acute and Chronic Pain Monitoring:        No data to display                      REVIEW OF SYSTEMS :      Review of Systems   Constitutional:  Positive for fatigue. Negative for  Records Reviewed, DDx, testing done/not done, ED Course, Reassessment, disposition considerations/shared decision making with patient, consults, disposition:          [facility]    ED Course as of 06/05/25 2109   Thu Jun 05, 2025   1106 Patient was signed out to me by Dr. Sams.  I did not perform a complete history and physical on the patient.  Please see Dr. Sams's note for complete history and physical. Briefly, patient presents with leg swelling. Signed out pending CT scan and LE dopplers.    CT scan read as possible diverticulitis however patient has no abdominal pain.  Lower extremity Dopplers negative for DVT.    Hospitalist was consulted. Dr. Camacho accepted the patient for admission.  [JA]   2107 CBC with Auto Differential(!):    WBC 5.9   RBC 3.60(!)   Hemoglobin Quant 13.7   Hematocrit 38.2   .1(!)   MCH 38.1(!)   MCHC 35.9(!)   RDW 16.8(!)   MPV 11.1   Platelet, Fluorescence 67(!)   Neutrophils % 61   Lymphocyte % 24   Monocytes % 13(!)   Eosinophils % 1   Basophils % 1   Immature Granulocytes % 0   Neutrophils Absolute 3.59   Lymphocytes Absolute 1.41(!)   Monocytes Absolute 0.77   Eosinophils Absolute 0.04(!)   Basophils Absolute 0.05   Immature Granulocytes Absolute <0.03 [VG]   2107 Troponin(!):    Troponin, High Sensitivity 51(!) [VG]   2107 Bilirubin Total Direct & Indirect(!):    Total Bilirubin 6.9(!)   Bilirubin, Direct 4.3(!)   Bilirubin, Indirect 2.6(!) [VG]   2107 Serum Drug Screen(!):    Acetaminophen Level <5(!)   Ethanol Lvl <10   Salicyclic Acid <0.3   Toxic Tricyclic Sc,Blood NEGATIVE [VG]   2107 Brain Natriuretic Peptide(!):    NT Pro-(!) [VG]   2107 Ammonia:    Ammonia 20 [VG]   2107 CMP(!):    Sodium 138   Potassium 3.3(!)   Chloride 98   CARBON DIOXIDE 28   Anion Gap 12   Glucose 84   BUN,BUNPL 16   Creatinine 0.8   Est, Glom Filt Rate >90   Calcium 9.2   Total Protein 6.1(!)   Albumin 3.0(!)   Total Bilirubin 6.9(!)   Alkaline Phosphatase 136(!)   ALT 67(!)   (!)  this time and they are agreeable with the plan.    CONSULTS:   None       Please see ED course for any additional MDM documentation.       CRITICAL CARE TIME (.cct)     0 minutes            I am the Primary Clinician of Record.    FINAL IMPRESSION      1. Cellulitis of right lower extremity    2. Bilateral lower extremity edema    3. General weakness    4. Hyperbilirubinemia    5. Alcoholic cirrhosis, unspecified whether ascites present (HCC)          DISPOSITION/PLAN     DISPOSITION      Disposition: Admit to telemetry  Patient condition is stable      PATIENT REFERRED TO:  No follow-up provider specified.    DISCHARGE MEDICATIONS:  New Prescriptions    No medications on file       DISCONTINUED MEDICATIONS:  Discontinued Medications    No medications on file                   Ysabel Sams D.O.     Emergency Medicine      6/5/2025 5:31 AM      NOTE: This report was transcribed using voice recognition software. Every effort was made to ensure accuracy; however, inadvertent computerized transcription errors may be present            Ysabel Sams DO  06/05/25 [id number]

## 2025-06-05 NOTE — PROGRESS NOTES
Went to give patient metoprolol PO for afib RVR. Metoprolol given. Noted patient had a bowel movement and asking to be cleaned up. Left room to grab supplies. Came back with Mara RN to help clean patient up. Patient alert and oriented at this time. Turned patient on his side and he started screaming. Quickly after started seizing and became blue, not maintaining airway. Code initiated at this time. Refer to code/RRT narrator.

## 2025-06-05 NOTE — H&P
[facility]   History and Physical      CHIEF COMPLAINT:  BLE swelling    History of Present Illness:  64 y.o. male with a history of a fib on Eliquis, alcoholic cirrhosis of liver, chronic hepatitis C, ED, gout, OA, UC, presents with BLE swelling, worsened on R leg.  Patient reports swelling is always present but last night right lower extremity was increasingly swollen, red, warm to touch.  Patient reports some pain to right lower extremity as well.  Patient does have known history of atrial fibrillation for which she does take Eliquis.  Patient also reports he takes Lasix at home and that when swelling is increased he will increase his dosage of this to get swelling to go down.  Patient does have known history of EtOH abuse and reports he does not follow with GI.    ED imaging: CTAP showed concern for mild acute sigmoid diverticulitis, constipation, heterogeneous appearance of liver with several lesions and areas of increased density, stable angiomyolipoma of the left kidney.  Pertinent abnormal labs: K3.3, protein 6.1, albumin 3.0, bilirubin 6.9, alkaline phosphatase 136, ALT 67, , , CRP 6.5, total bilirubin 6.9, bilirubin direct 4.3, bilirubin indirect 2.6, troponin 52, repeat 51.  ED course:cefazolin 2000mg, doxycycline 100mg, lasix 40mg, potassium chloride 40mEq PO.    Informant(s) for H&P:Pt and EMR    REVIEW OF SYSTEMS:  no fevers, chills, cp, sob, n/v, ha, vision/hearing changes, wt changes, hot/cold flashes, other open skin lesions, diarrhea, constipation, dysuria/hematuria unless noted in HPI. Complete ROS performed with the patient and is otherwise negative.      PMH:  Past Medical History:   Diagnosis Date    Atrial fibrillation with RVR (HCC)        Surgical History:  Past Surgical History:   Procedure Laterality Date    HIP FRACTURE SURGERY Right     Rt Hip. Operated on 4 times.    KNEE SURGERY Left     LAMINECTOMY Bilateral 10/10/2023    L3-L5  enhancement identified of the renal parenchyma.  Stable angiomyolipoma identified on the left kidney.  No stones or distension seen in the renal collecting system. GI/Bowel: The stomach is unremarkable in appearance.  No wall thickening. The small bowel is within normal limits.  No mucosal abnormality.  The appendix is normal.  Increased stool burden seen diffusely throughout the colon to suggest clinical presentation of constipation.  Short segment of wall thickening identified of the sigmoid colon.  Adjacent surrounding diverticulum.  Mild acute sigmoid diverticulitis cannot be completely excluded.  No significant surrounding stranding. Pelvis: The bladder is mildly distended.  No wall thickening.  Heterogeneous appearance of the prostate. Peritoneum/Retroperitoneum: No abdominal retroperitoneal lymphadenopathy. Extensive varices identified throughout the periportal region and right flank.  Vascular calcifications seen within the abdominal aorta and iliac vessels.  No pelvic adenopathy.  No free fluid or free air.  No abnormal mass or fluid collections identified.  No pelvic adenopathy. Bones/Soft Tissues: Bony structures reveal degenerative changes seen diffusely throughout the spine and pelvis.  Degenerative changes most pronounced at the L4/L5 level.  Hardware identified within the right hip from total right hip arthroplasty.  Small bilateral inguinal hernias containing fat only.  No ventral abdominal wall mass or defect.     1. Short segment of wall thickening identified of the sigmoid colon. Adjacent surrounding diverticulum. Mild acute sigmoid diverticulitis cannot be completely excluded. No significant surrounding stranding. 2. Increased stool burden seen diffusely throughout the colon to suggest clinical presentation of constipation. 3. Heterogeneous appearance of the liver with several lesions and areas of increased density again seen within the right lobe which are similar in appearance when compared to

## 2025-06-05 NOTE — PROGRESS NOTES
4 Eyes Skin Assessment     NAME:  Willem Kelley  YOB: 1960  MEDICAL RECORD NUMBER:  [id number]    The patient is being assessed for  Admission    I agree that at least one RN has performed a thorough Head to Toe Skin Assessment on the patient. ALL assessment sites listed below have been assessed.      Areas assessed by both nurses:    Head, Face, Ears, Shoulders, Back, Chest, Arms, Elbows, Hands, Sacrum. Buttock, Coccyx, Ischium, and Legs. Feet and Heels        Does the Patient have a Wound? No noted wound(s)       Suman Prevention initiated by RN: No  Wound Care Orders initiated by RN: No    Pressure Injury (Stage 3,4, Unstageable, DTI, NWPT, and Complex wounds) if present, place Wound referral order by RN under : No    New Ostomies, if present place, Ostomy referral order under : No     Nurse 1 eSignature: Electronically signed by MIGDALIA CARR RN on 6/5/25 at 7:49 PM EDT    **SHARE this note so that the co-signing nurse can place an eSignature**    Nurse 2 eSignature: Electronically signed by Jack Carr RN on 6/5/25 at 7:50 PM EDT

## 2025-06-06 LAB
AFP SERPL-MCNC: 4 UG/L
ALBUMIN SERPL-MCNC: 2.8 G/DL (ref 3.5–5.2)
ALP SERPL-CCNC: 130 U/L (ref 40–129)
ALT SERPL-CCNC: 55 U/L (ref 0–40)
ANION GAP SERPL CALCULATED.3IONS-SCNC: 9 MMOL/L (ref 7–16)
AST SERPL-CCNC: 125 U/L (ref 0–39)
BASOPHILS # BLD: 0.01 K/UL (ref 0–0.2)
BASOPHILS NFR BLD: 0 % (ref 0–2)
BILIRUB DIRECT SERPL-MCNC: 3.1 MG/DL (ref 0–0.3)
BILIRUB INDIRECT SERPL-MCNC: 1.7 MG/DL (ref 0–1)
BILIRUB SERPL-MCNC: 4.8 MG/DL (ref 0–1.2)
BUN SERPL-MCNC: 14 MG/DL (ref 6–23)
CALCIUM SERPL-MCNC: 8.8 MG/DL (ref 8.6–10.2)
CHLORIDE SERPL-SCNC: 101 MMOL/L (ref 98–107)
CO2 SERPL-SCNC: 30 MMOL/L (ref 22–29)
CREAT SERPL-MCNC: 0.9 MG/DL (ref 0.7–1.2)
EOSINOPHIL # BLD: 0 K/UL (ref 0.05–0.5)
EOSINOPHILS RELATIVE PERCENT: 0 % (ref 0–6)
ERYTHROCYTE [DISTWIDTH] IN BLOOD BY AUTOMATED COUNT: 16.8 % (ref 11.5–15)
FERRITIN SERPL-MCNC: 1124 NG/ML
GFR, ESTIMATED: >90 ML/MIN/1.73M2
GLUCOSE SERPL-MCNC: 126 MG/DL (ref 74–99)
HCT VFR BLD AUTO: 37 % (ref 37–54)
HGB BLD-MCNC: 13.2 G/DL (ref 12.5–16.5)
IMM GRANULOCYTES # BLD AUTO: 0.1 K/UL (ref 0–0.58)
IMM GRANULOCYTES NFR BLD: 1 % (ref 0–5)
INR PPP: 3
IRON SATN MFR SERPL: 27 % (ref 20–55)
IRON SERPL-MCNC: 33 UG/DL (ref 61–157)
LACTATE BLDV-SCNC: 1.7 MMOL/L (ref 0.5–2.2)
LYMPHOCYTES NFR BLD: 0.67 K/UL (ref 1.5–4)
LYMPHOCYTES RELATIVE PERCENT: 7 % (ref 20–42)
MAGNESIUM SERPL-MCNC: 1.9 MG/DL (ref 1.6–2.6)
MCH RBC QN AUTO: 37.6 PG (ref 26–35)
MCHC RBC AUTO-ENTMCNC: 35.7 G/DL (ref 32–34.5)
MCV RBC AUTO: 105.4 FL (ref 80–99.9)
MONOCYTES NFR BLD: 0.61 K/UL (ref 0.1–0.95)
MONOCYTES NFR BLD: 6 % (ref 2–12)
NEUTROPHILS NFR BLD: 86 % (ref 43–80)
NEUTS SEG NFR BLD: 8.32 K/UL (ref 1.8–7.3)
PHOSPHATE SERPL-MCNC: 3.4 MG/DL (ref 2.5–4.5)
PLATELET CONFIRMATION: NORMAL
PLATELET, FLUORESCENCE: 77 K/UL (ref 130–450)
PMV BLD AUTO: 10.8 FL (ref 7–12)
POTASSIUM SERPL-SCNC: 3.4 MMOL/L (ref 3.5–5)
PROT SERPL-MCNC: 5.7 G/DL (ref 6.4–8.3)
PROTHROMBIN TIME: 32.8 SEC (ref 9.3–12.4)
RBC # BLD AUTO: 3.51 M/UL (ref 3.8–5.8)
SODIUM SERPL-SCNC: 140 MMOL/L (ref 132–146)
TIBC SERPL-MCNC: 125 UG/DL (ref 250–450)
WBC OTHER # BLD: 9.7 K/UL (ref 4.5–11.5)

## 2025-06-06 PROCEDURE — 83883 ASSAY NEPHELOMETRY NOT SPEC: CPT

## 2025-06-06 PROCEDURE — 82247 BILIRUBIN TOTAL: CPT

## 2025-06-06 PROCEDURE — 6370000000 HC RX 637 (ALT 250 FOR IP): Performed by: NURSE PRACTITIONER

## 2025-06-06 PROCEDURE — 85610 PROTHROMBIN TIME: CPT

## 2025-06-06 PROCEDURE — 82248 BILIRUBIN DIRECT: CPT

## 2025-06-06 PROCEDURE — 6370000000 HC RX 637 (ALT 250 FOR IP)

## 2025-06-06 PROCEDURE — 6360000002 HC RX W HCPCS

## 2025-06-06 PROCEDURE — 6360000002 HC RX W HCPCS: Performed by: STUDENT IN AN ORGANIZED HEALTH CARE EDUCATION/TRAINING PROGRAM

## 2025-06-06 PROCEDURE — 83735 ASSAY OF MAGNESIUM: CPT

## 2025-06-06 PROCEDURE — 85025 COMPLETE CBC W/AUTO DIFF WBC: CPT

## 2025-06-06 PROCEDURE — 86038 ANTINUCLEAR ANTIBODIES: CPT

## 2025-06-06 PROCEDURE — 99232 SBSQ HOSP IP/OBS MODERATE 35: CPT | Performed by: STUDENT IN AN ORGANIZED HEALTH CARE EDUCATION/TRAINING PROGRAM

## 2025-06-06 PROCEDURE — 2500000003 HC RX 250 WO HCPCS

## 2025-06-06 PROCEDURE — 83550 IRON BINDING TEST: CPT

## 2025-06-06 PROCEDURE — 82947 ASSAY GLUCOSE BLOOD QUANT: CPT

## 2025-06-06 PROCEDURE — 84100 ASSAY OF PHOSPHORUS: CPT

## 2025-06-06 PROCEDURE — 84450 TRANSFERASE (AST) (SGOT): CPT

## 2025-06-06 PROCEDURE — 82465 ASSAY BLD/SERUM CHOLESTEROL: CPT

## 2025-06-06 PROCEDURE — G0480 DRUG TEST DEF 1-7 CLASSES: HCPCS

## 2025-06-06 PROCEDURE — 82172 ASSAY OF APOLIPOPROTEIN: CPT

## 2025-06-06 PROCEDURE — 83010 ASSAY OF HAPTOGLOBIN QUANT: CPT

## 2025-06-06 PROCEDURE — 2060000000 HC ICU INTERMEDIATE R&B

## 2025-06-06 PROCEDURE — 80074 ACUTE HEPATITIS PANEL: CPT

## 2025-06-06 PROCEDURE — 84478 ASSAY OF TRIGLYCERIDES: CPT

## 2025-06-06 PROCEDURE — 82977 ASSAY OF GGT: CPT

## 2025-06-06 PROCEDURE — 82103 ALPHA-1-ANTITRYPSIN TOTAL: CPT

## 2025-06-06 PROCEDURE — 83605 ASSAY OF LACTIC ACID: CPT

## 2025-06-06 PROCEDURE — 83540 ASSAY OF IRON: CPT

## 2025-06-06 PROCEDURE — 82105 ALPHA-FETOPROTEIN SERUM: CPT

## 2025-06-06 PROCEDURE — 84460 ALANINE AMINO (ALT) (SGPT): CPT

## 2025-06-06 PROCEDURE — 82728 ASSAY OF FERRITIN: CPT

## 2025-06-06 PROCEDURE — 80053 COMPREHEN METABOLIC PANEL: CPT

## 2025-06-06 PROCEDURE — 86039 ANTINUCLEAR ANTIBODIES (ANA): CPT

## 2025-06-06 RX ORDER — PANTOPRAZOLE SODIUM 40 MG/1
40 TABLET, DELAYED RELEASE ORAL
Status: DISCONTINUED | OUTPATIENT
Start: 2025-06-06 | End: 2025-06-07 | Stop reason: HOSPADM

## 2025-06-06 RX ORDER — DOCUSATE SODIUM 100 MG/1
100 CAPSULE, LIQUID FILLED ORAL 2 TIMES DAILY
Status: DISCONTINUED | OUTPATIENT
Start: 2025-06-06 | End: 2025-06-07 | Stop reason: HOSPADM

## 2025-06-06 RX ADMIN — Medication 500 MG: at 08:41

## 2025-06-06 RX ADMIN — PANTOPRAZOLE SODIUM 40 MG: 40 TABLET, DELAYED RELEASE ORAL at 05:50

## 2025-06-06 RX ADMIN — SODIUM CHLORIDE, PRESERVATIVE FREE 10 ML: 5 INJECTION INTRAVENOUS at 08:42

## 2025-06-06 RX ADMIN — CALCIUM CARBONATE-VITAMIN D TAB 500 MG-200 UNIT 1 TABLET: 500-200 TAB at 08:41

## 2025-06-06 RX ADMIN — POTASSIUM CHLORIDE 10 MEQ: 750 TABLET, EXTENDED RELEASE ORAL at 08:41

## 2025-06-06 RX ADMIN — WATER 2000 MG: 1 INJECTION INTRAMUSCULAR; INTRAVENOUS; SUBCUTANEOUS at 14:53

## 2025-06-06 RX ADMIN — GABAPENTIN 300 MG: 300 CAPSULE ORAL at 21:22

## 2025-06-06 RX ADMIN — APIXABAN 5 MG: 5 TABLET, FILM COATED ORAL at 08:41

## 2025-06-06 RX ADMIN — GABAPENTIN 300 MG: 300 CAPSULE ORAL at 08:41

## 2025-06-06 RX ADMIN — Medication 100 MG: at 08:41

## 2025-06-06 RX ADMIN — MAGNESIUM OXIDE 400 MG (241.3 MG MAGNESIUM) TABLET 400 MG: TABLET at 08:41

## 2025-06-06 RX ADMIN — DOCUSATE SODIUM 100 MG: 100 CAPSULE, LIQUID FILLED ORAL at 08:41

## 2025-06-06 RX ADMIN — FUROSEMIDE 20 MG: 10 INJECTION, SOLUTION INTRAMUSCULAR; INTRAVENOUS at 08:41

## 2025-06-06 RX ADMIN — DOCUSATE SODIUM 100 MG: 100 CAPSULE, LIQUID FILLED ORAL at 21:23

## 2025-06-06 RX ADMIN — Medication 2000 UNITS: at 08:41

## 2025-06-06 RX ADMIN — APIXABAN 5 MG: 5 TABLET, FILM COATED ORAL at 21:23

## 2025-06-06 RX ADMIN — WATER 2000 MG: 1 INJECTION INTRAMUSCULAR; INTRAVENOUS; SUBCUTANEOUS at 05:50

## 2025-06-06 RX ADMIN — WATER 2000 MG: 1 INJECTION INTRAMUSCULAR; INTRAVENOUS; SUBCUTANEOUS at 21:23

## 2025-06-06 RX ADMIN — POTASSIUM CHLORIDE 40 MEQ: 1500 TABLET, EXTENDED RELEASE ORAL at 06:29

## 2025-06-06 RX ADMIN — SODIUM CHLORIDE, PRESERVATIVE FREE 10 ML: 5 INJECTION INTRAVENOUS at 21:23

## 2025-06-06 RX ADMIN — FOLIC ACID 1 MG: 1 TABLET ORAL at 08:41

## 2025-06-06 RX ADMIN — PHENOBARBITAL SODIUM 65 MG: 65 INJECTION INTRAMUSCULAR; INTRAVENOUS at 05:50

## 2025-06-06 ASSESSMENT — PAIN SCALES - GENERAL
PAINLEVEL_OUTOF10: 4
PAINLEVEL_OUTOF10: 4

## 2025-06-06 ASSESSMENT — PAIN DESCRIPTION - LOCATION
LOCATION: BACK
LOCATION: BACK

## 2025-06-06 NOTE — PROGRESS NOTES
4 Eyes Skin Assessment     NAME:  Willem Kelley  YOB: 1960  MEDICAL RECORD NUMBER:  [id number]    The patient is being assessed for  Admission    I agree that at least one RN has performed a thorough Head to Toe Skin Assessment on the patient. ALL assessment sites listed below have been assessed.      Areas assessed by both nurses:    Head, Face, Ears, Shoulders, Back, Chest, Arms, Elbows, Hands, Sacrum. Buttock, Coccyx, Ischium, Legs. Feet and Heels, and Under Medical Devices         Does the Patient have a Wound? Yes wound(s) were present on assessment. LDA wound assessment was Initiated and completed by RN       Suman Prevention initiated by RN: Yes  Wound Care Orders initiated by RN: Yes    Pressure Injury (Stage 3,4, Unstageable, DTI, NWPT, and Complex wounds) if present, place Wound referral order by RN under : Yes, 1x1 area on buttock, nonblanchable.    New Ostomies, if present place, Ostomy referral order under : No     Nurse 1 eSignature: Electronically signed by Denise Cristobal RN on 6/6/25 at 4:21 AM EDT    **SHARE this note so that the co-signing nurse can place an eSignature**    Nurse 2 eSignature: Electronically signed by Ambar Leo RN on 6/6/25 at 4:22 AM EDT
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
## 2025-06-06 NOTE — PROGRESS NOTES
Patient admitted from 5S to 210, with the following belongings; duffle bag full of matching outfits and underwear, blue sweat pants, plaid button up, underwear, cell phone, phone  cord, dentures and reading glasses. placed on monitor, patient oriented to room and unit visiting hours.  Patient guide at bedside, reviewed patient rights and responsibilities. MRSA nasal swab obtained.  Bed alarm on.  Call light within reach.

## 2025-06-06 NOTE — PROGRESS NOTES
4 Eyes Skin Assessment     NAME:  Willem Kelley  YOB: 1960  MEDICAL RECORD NUMBER:  [id number]    The patient is being assessed for  Transfer to New Unit    I agree that at least one RN has performed a thorough Head to Toe Skin Assessment on the patient. ALL assessment sites listed below have been assessed.      Areas assessed by both nurses:    Head, Face, Ears, Shoulders, Back, Chest, Arms, Elbows, Hands, Sacrum. Buttock, Coccyx, Ischium, Legs. Feet and Heels, and Under Medical Devices         Does the Patient have a Wound? No noted wound(s)       Suman Prevention initiated by RN: Yes  Wound Care Orders initiated by RN: No    Pressure Injury (Stage 3,4, Unstageable, DTI, NWPT, and Complex wounds) if present, place Wound referral order by RN under : No    New Ostomies, if present place, Ostomy referral order under : No     Nurse 1 eSignature: Electronically signed by Kevin Gates RN on 6/6/25 at 3:08 PM EDT    **SHARE this note so that the co-signing nurse can place an eSignature**    Nurse 2 eSignature: Electronically signed by Lili Mills RN on 6/6/25 at 3:09 PM EDT

## 2025-06-06 NOTE — PATIENT CARE CONFERENCE
Intensive Care Daily Quality Rounding Checklist      ICU Team Members: bedside nurse, charge nurse, residents ,    ICU Day #: NUMBER: 2    SOFA Score: 7    Intubation Date:  N/A    Ventilator Day #: N/A    Central Line Insertion Date: N/A         Day #: N/A        Indication: N/A     Arterial Line Insertion Date:  N/A      Day #: N/A    Temporary Hemodialysis Catheter Insertion Date:  N/A      Day # N/A    DVT Prophylaxis: eliquis    GI Prophylaxis: protonix    Neal Catheter Insertion Date:  N/A       Day #: N/A      Indications: N/A      Continued need (if yes, reason documented and discussed with physician): N/A    Skin Issues/ Wounds and ordered treatment discussed on rounds: Yes, small 1x1 nonblanchable red area. Wound consult placed.    Goals/ Plans for the Day: Labs - Report Results, monitor/replace as needed, transfer out of ICU    Reviewed plan and goals for day with patient and/or representative: Yes    **SHARE this note so that the rounding nurse may edit**

## 2025-06-06 NOTE — CONSULTS
Critical Care Admit/Consult Note         Patient - Willem Kelley   MRN -  [id number]   [facility] [id number]   - 1960      Date of Admission -  2025  1:19 AM  Date of evaluation -  2025  0210/0210-A   Hospital Day - 1            ADMIT/CONSULT DETAILS     Reason for Admit/Consult   Seizure, alcohol withdrawal    Consulting Service/Physician   Consulting - Anais Camacho MD  Primary Care Physician - No primary care provider on file.         HPI   63yo male with PMH of Alcohol Abuse, PAF (on Apixaban) presented to ED on 25 with RLE cellulitis.  Started on Cefazolin.  Duplex negative.  While on acute care unit patient was found unresponsive and without pulse.  1 round of CPR performed with ROSC.  After ROSC patient had seizure and incontinence.  He was then confused and vomited.  Transferred to ICU for further monitoring.  Critical care team consulted to assist with management.  Upon evaluation in ICU patient is confused but following commands.  +tremors present.  Given phenobarbital with improvement.  VSS.  Remains on RA. Will continue to monitor closely in ICU.    Past Medical History         Diagnosis Date    Atrial fibrillation with RVR (HCC)         Past Surgical History           Procedure Laterality Date    HIP FRACTURE SURGERY Right     Rt Hip. Operated on 4 times.    KNEE SURGERY Left     LAMINECTOMY Bilateral 10/10/2023    L3-L5 LUMBAR LAMINECTOMY POSTERIOR, L4-L5 DISK BIOPSY performed by Jose R Berkowitz MD at [facility] [place] [place]    LEG SURGERY Right        Current Medications   Current Medications    pantoprazole  40 mg Oral QAM AC    docusate sodium  100 mg Oral BID    sodium chloride flush  5-40 mL IntraVENous 2 times per day    sodium chloride flush  5-40 mL IntraVENous 2 times per day    thiamine  100 mg Oral Daily    ceFAZolin  2,000 mg IntraVENous q8h    potassium chloride  10 mEq Oral QAM    apixaban  5 mg Oral BID    oyster shell calcium w/D  1 tablet Oral QAM    folic acid  1 mg Oral QAM

## 2025-06-06 NOTE — FLOWSHEET NOTE
Inpatient Wound Care (initial consult) 629    Admit Date: 6/5/2025  1:19 AM    Reason for consult:  buttocks    Patient laying down in bed, awake, alert and oriented. Independent to roll.     Significant history:  per H&P    CHIEF COMPLAINT:  BLE swelling     History of Present Illness:  64 y.o. male with a history of a fib on Eliquis, alcoholic cirrhosis of liver, chronic hepatitis C, ED, gout, OA, UC, presents with BLE swelling, worsened on R leg.  Patient reports swelling is always present but last night right lower extremity was increasingly swollen, red, warm to touch.  Patient reports some pain to right lower extremity as well.  Patient does have known history of atrial fibrillation for which she does take Eliquis.  Patient also reports he takes Lasix at home and that when swelling is increased he will increase his dosage of this to get swelling to go down.  Patient does have known history of EtOH abuse and reports he does not follow with GI.    Past Medical History:   Diagnosis Date    Atrial fibrillation with RVR (HCC)      Findings:     06/06/25 1425   Skin Integumentary    Skin Integrity Vascular discoloration or hemochromatosis/staining   Location BLE   Skin Integrity Site 3   Skin Integrity Location 3 Ecchymosis    Location 3 low back     Bilateral buttocks intact blanching  Both heels intact blanching     **Informed Consent**    The patient has given verbal consent to have photos taken of wound and inserted into their chart as part of their permanent medical record for purposes of documentation, treatment management and/or medical review.   All Images taken on 6/6/25 of patient name: Willem Kelley were transmitted and stored on secured Epic  Site located within Media Folder Tab by a registered Epic-Haiku Mobile Application Device.     Plan: Clear aid ointment to bilateral buttocks  Comfort glide  Chair waffle cushion  Patient will need continued preventative care    Anne Meza RN 6/6/2025  anthony Meza RN 6/6/2025 2:53 PM

## 2025-06-06 NOTE — PROGRESS NOTES
[facility]  Department of Internal Medicine   Internal Medicine Residency   MICU Progress Note    Patient:  Willem Kelley 64 y.o. male  MRN: [id number]     Date of Service: 6/6/2025    Allergy: Morphine    Overnight Events: None    Subjective     Patient seen and examined this morning.  He was awake and alert in no acute distress resting comfortably in bed.  He has had no acute events since beginning phenobarbital.  Otherwise hemodynamically stable    ROS: Denies fever, chills, chest pain, shortness of breath, N/V/C/D, dysuria or blood in stool or urine     Objective     VS: /64   Pulse 91   Temp 98.4 °F (36.9 °C) (Axillary)   Resp 10   Ht 1.93 m (6' 4\")   Wt 93.6 kg (206 lb 5.6 oz)   SpO2 99%   BMI 25.12 kg/m²           I & O - 24hr:   Intake/Output Summary (Last 24 hours) at 6/6/2025 0740  Last data filed at 6/6/2025 0636  Gross per 24 hour   Intake --   Output 800 ml   Net -800 ml       Sedatives: none    Pressors: none    Oxygen: Room air    ABG:   No results found for: \"PHART\", \"PH\", \"VBR4KIK\", \"PCO2\", \"PO2ART\", \"PO2\", \"EKV3LIY\", \"HCO3\", \"BEART\", \"BE\", \"THGBART\", \"THB\", \"XTN3NNS\", \"X6LAZSGL\", \"O2SAT\"    Physical Exam:  General Appearance: No acute distress.   Neuro: Round symmetric pupil that react to light bilaterally    Cardiovascular: regular rate and rhythm, S1 and S2 heard, no murmurs appreciated   Respiratory: Clear to auscultation bilaterally. No wheezing or crackles appreciated.  Abdomen: Soft, non-tender; bowel sounds normal; no masses, no organomegaly, rebound or guarding  Extremities: Extremities normal, no cyanosis, distal pulses intact, edema noted to bilateral lower extremities.  Erythema noted to right calf consistent with known cellulitis    Lines & Urinary Catheter:     -Peripheral IV to right hand     Labs     Basic Labs    Complete Blood Count:   Recent Labs     06/05/25  0402 06/06/25  0455   WBC 5.9 9.7   HGB 13.7 13.2   HCT 38.2 37.0        Last 3 Blood  Glucose:   Recent Labs     06/05/25  0402 06/06/25  0455   GLUCOSE 84 126*        PT/INR:    Lab Results   Component Value Date/Time    PROTIME 16.4 06/06/2024 02:45 PM    INR 1.5 06/06/2024 02:45 PM     PTT:  No results found for: \"APTT\"    Comprehensive Metabolic Profile:   Recent Labs     06/05/25  0402 06/06/25  0455    140   K 3.3* 3.4*   CL 98 101   CO2 28 30*   BUN 16 14   CREATININE 0.8 0.9   GLUCOSE 84 126*   CALCIUM 9.2 8.8   BILITOT 6.9*  6.9* 4.8*   ALKPHOS 136* 130*   * 125*   ALT 67* 55*      Magnesium:   Lab Results   Component Value Date/Time    MG 1.9 06/06/2025 04:55 AM     Phosphorus:   Lab Results   Component Value Date/Time    PHOS 3.4 06/06/2025 04:55 AM     Ionized Calcium: No results found for: \"CAION\"   Troponin: No results for input(s): \"TROPONINI\" in the last 72 hours.    Imaging Studies:  Reviewed as appropriate        Resident's Assessment and Plan     Summary of Hospital Course:       Consults:   Critical care       Assessment Plan:  Neuro   #Delirium Tremens/Alcohol Withdrawal with Seizures  -Continue Phenobarbital   -Gabapentin 300mg BID  -Folic acid and Thiamine daily  -Alcohol level negative on admission  -Ammonia negative  -Acetaminophen/salicylate levels negative  -Trazodone PRN     Respiratory   -stable on RA  -Encourage IS and mobilization     Cardiovascular   #PAF  -ECHO done in 2023 showed EF 60%, indetermine diastolic dysfunction  -Continue home Metoprolol  - on admission  -Continue home Metoprolol 50mg BID and Apixaban     Gastrointestinal   -Regular Diet  -Docusate BID     Renal   -BUN/Cr WNL  -Avoid nephrotoxins  -Strict I&O  -Hold home Furosemide     Infectious Disease   #RLE cellulitis  -Cefazolin q8h  -RLE duplex negative  -No leukocytosis, lactic acid pending     Hematology/Oncology   -Daily CBC     Endocrine/Rheumatoid   -glucose monitoring with BMP daily       Social/Spiritual/DNR/Other  Code status: Full code  Diet   Stress ulcer prophylaxis:

## 2025-06-07 VITALS
WEIGHT: 206.35 LBS | SYSTOLIC BLOOD PRESSURE: 89 MMHG | BODY MASS INDEX: 25.13 KG/M2 | HEIGHT: 76 IN | TEMPERATURE: 98.1 F | DIASTOLIC BLOOD PRESSURE: 60 MMHG | HEART RATE: 68 BPM | OXYGEN SATURATION: 97 % | RESPIRATION RATE: 16 BRPM

## 2025-06-07 LAB
ALBUMIN SERPL-MCNC: 2.2 G/DL (ref 3.5–5.2)
ALP SERPL-CCNC: 121 U/L (ref 40–129)
ALT SERPL-CCNC: 32 U/L (ref 0–40)
ANION GAP SERPL CALCULATED.3IONS-SCNC: 7 MMOL/L (ref 7–16)
AST SERPL-CCNC: 90 U/L (ref 0–39)
BASOPHILS # BLD: 0.05 K/UL (ref 0–0.2)
BASOPHILS NFR BLD: 1 % (ref 0–2)
BILIRUB DIRECT SERPL-MCNC: 1.6 MG/DL (ref 0–0.3)
BILIRUB INDIRECT SERPL-MCNC: 1.6 MG/DL (ref 0–1)
BILIRUB SERPL-MCNC: 3.2 MG/DL (ref 0–1.2)
BNP SERPL-MCNC: 785 PG/ML (ref 0–125)
BUN SERPL-MCNC: 11 MG/DL (ref 6–23)
CALCIUM SERPL-MCNC: 7.7 MG/DL (ref 8.6–10.2)
CHLORIDE SERPL-SCNC: 106 MMOL/L (ref 98–107)
CHOLEST SERPL-MCNC: 50 MG/DL
CO2 SERPL-SCNC: 28 MMOL/L (ref 22–29)
CREAT SERPL-MCNC: 0.7 MG/DL (ref 0.7–1.2)
EOSINOPHIL # BLD: 0.13 K/UL (ref 0.05–0.5)
EOSINOPHILS RELATIVE PERCENT: 2 % (ref 0–6)
ERYTHROCYTE [DISTWIDTH] IN BLOOD BY AUTOMATED COUNT: 16.7 % (ref 11.5–15)
GFR, ESTIMATED: >90 ML/MIN/1.73M2
GLUCOSE SERPL-MCNC: 89 MG/DL (ref 74–99)
HAV IGM SERPL QL IA: NONREACTIVE
HBV CORE IGM SERPL QL IA: NONREACTIVE
HBV SURFACE AG SERPL QL IA: NONREACTIVE
HCT VFR BLD AUTO: 38.2 % (ref 37–54)
HCV AB SERPL QL IA: REACTIVE
HDLC SERPL-MCNC: 12 MG/DL
HGB BLD-MCNC: 13.1 G/DL (ref 12.5–16.5)
IMM GRANULOCYTES # BLD AUTO: 0.03 K/UL (ref 0–0.58)
IMM GRANULOCYTES NFR BLD: 0 % (ref 0–5)
LDLC SERPL CALC-MCNC: 28 MG/DL
LYMPHOCYTES NFR BLD: 1.53 K/UL (ref 1.5–4)
LYMPHOCYTES RELATIVE PERCENT: 22 % (ref 20–42)
MAGNESIUM SERPL-MCNC: 1.5 MG/DL (ref 1.6–2.6)
MCH RBC QN AUTO: 37.6 PG (ref 26–35)
MCHC RBC AUTO-ENTMCNC: 34.3 G/DL (ref 32–34.5)
MCV RBC AUTO: 109.8 FL (ref 80–99.9)
MICROORGANISM SPEC CULT: NORMAL
MONOCYTES NFR BLD: 0.72 K/UL (ref 0.1–0.95)
MONOCYTES NFR BLD: 10 % (ref 2–12)
NEUTROPHILS NFR BLD: 65 % (ref 43–80)
NEUTS SEG NFR BLD: 4.45 K/UL (ref 1.8–7.3)
PLATELET CONFIRMATION: NORMAL
PLATELET, FLUORESCENCE: 90 K/UL (ref 130–450)
PMV BLD AUTO: 10.7 FL (ref 7–12)
POTASSIUM SERPL-SCNC: 3.5 MMOL/L (ref 3.5–5)
PROT SERPL-MCNC: 4.8 G/DL (ref 6.4–8.3)
RBC # BLD AUTO: 3.48 M/UL (ref 3.8–5.8)
RBC # BLD: ABNORMAL 10*6/UL
SEND OUT REPORT: NORMAL
SODIUM SERPL-SCNC: 141 MMOL/L (ref 132–146)
SPECIMEN DESCRIPTION: NORMAL
TEST NAME: NORMAL
TRIGL SERPL-MCNC: 48 MG/DL
VLDLC SERPL CALC-MCNC: 10 MG/DL
WBC OTHER # BLD: 6.9 K/UL (ref 4.5–11.5)

## 2025-06-07 PROCEDURE — 80061 LIPID PANEL: CPT

## 2025-06-07 PROCEDURE — 85025 COMPLETE CBC W/AUTO DIFF WBC: CPT

## 2025-06-07 PROCEDURE — 6360000002 HC RX W HCPCS: Performed by: STUDENT IN AN ORGANIZED HEALTH CARE EDUCATION/TRAINING PROGRAM

## 2025-06-07 PROCEDURE — 83880 ASSAY OF NATRIURETIC PEPTIDE: CPT

## 2025-06-07 PROCEDURE — 6360000002 HC RX W HCPCS

## 2025-06-07 PROCEDURE — 2500000003 HC RX 250 WO HCPCS

## 2025-06-07 PROCEDURE — 82248 BILIRUBIN DIRECT: CPT

## 2025-06-07 PROCEDURE — 83735 ASSAY OF MAGNESIUM: CPT

## 2025-06-07 PROCEDURE — 6370000000 HC RX 637 (ALT 250 FOR IP): Performed by: STUDENT IN AN ORGANIZED HEALTH CARE EDUCATION/TRAINING PROGRAM

## 2025-06-07 PROCEDURE — 6370000000 HC RX 637 (ALT 250 FOR IP)

## 2025-06-07 PROCEDURE — 80053 COMPREHEN METABOLIC PANEL: CPT

## 2025-06-07 PROCEDURE — 87522 HEPATITIS C REVRS TRNSCRPJ: CPT

## 2025-06-07 PROCEDURE — 99239 HOSP IP/OBS DSCHRG MGMT >30: CPT | Performed by: STUDENT IN AN ORGANIZED HEALTH CARE EDUCATION/TRAINING PROGRAM

## 2025-06-07 PROCEDURE — 97161 PT EVAL LOW COMPLEX 20 MIN: CPT

## 2025-06-07 PROCEDURE — 6370000000 HC RX 637 (ALT 250 FOR IP): Performed by: NURSE PRACTITIONER

## 2025-06-07 RX ORDER — CEPHALEXIN 500 MG/1
500 CAPSULE ORAL 2 TIMES DAILY
Qty: 16 CAPSULE | Refills: 0 | Status: SHIPPED | OUTPATIENT
Start: 2025-06-07 | End: 2025-06-07

## 2025-06-07 RX ORDER — POTASSIUM CHLORIDE 1500 MG/1
40 TABLET, EXTENDED RELEASE ORAL ONCE
Status: COMPLETED | OUTPATIENT
Start: 2025-06-07 | End: 2025-06-07

## 2025-06-07 RX ORDER — CEPHALEXIN 500 MG/1
500 CAPSULE ORAL 2 TIMES DAILY
Qty: 16 CAPSULE | Refills: 0 | Status: SHIPPED | OUTPATIENT
Start: 2025-06-07 | End: 2025-06-15

## 2025-06-07 RX ORDER — MAGNESIUM SULFATE IN WATER 40 MG/ML
2000 INJECTION, SOLUTION INTRAVENOUS ONCE
Status: COMPLETED | OUTPATIENT
Start: 2025-06-07 | End: 2025-06-07

## 2025-06-07 RX ORDER — PANTOPRAZOLE SODIUM 40 MG/1
40 TABLET, DELAYED RELEASE ORAL
Qty: 30 TABLET | Refills: 0 | Status: SHIPPED | OUTPATIENT
Start: 2025-06-08 | End: 2025-06-07

## 2025-06-07 RX ORDER — PANTOPRAZOLE SODIUM 40 MG/1
40 TABLET, DELAYED RELEASE ORAL
Qty: 30 TABLET | Refills: 0 | Status: SHIPPED | OUTPATIENT
Start: 2025-06-08

## 2025-06-07 RX ADMIN — Medication 2000 UNITS: at 08:05

## 2025-06-07 RX ADMIN — DOCUSATE SODIUM 100 MG: 100 CAPSULE, LIQUID FILLED ORAL at 08:05

## 2025-06-07 RX ADMIN — POTASSIUM CHLORIDE 10 MEQ: 750 TABLET, EXTENDED RELEASE ORAL at 08:05

## 2025-06-07 RX ADMIN — FOLIC ACID 1 MG: 1 TABLET ORAL at 08:05

## 2025-06-07 RX ADMIN — SODIUM CHLORIDE, PRESERVATIVE FREE 10 ML: 5 INJECTION INTRAVENOUS at 08:06

## 2025-06-07 RX ADMIN — FUROSEMIDE 20 MG: 10 INJECTION, SOLUTION INTRAMUSCULAR; INTRAVENOUS at 08:05

## 2025-06-07 RX ADMIN — Medication 100 MG: at 08:05

## 2025-06-07 RX ADMIN — PANTOPRAZOLE SODIUM 40 MG: 40 TABLET, DELAYED RELEASE ORAL at 05:11

## 2025-06-07 RX ADMIN — METOPROLOL TARTRATE 50 MG: 50 TABLET, FILM COATED ORAL at 08:06

## 2025-06-07 RX ADMIN — POTASSIUM CHLORIDE 40 MEQ: 1500 TABLET, EXTENDED RELEASE ORAL at 08:55

## 2025-06-07 RX ADMIN — CALCIUM CARBONATE-VITAMIN D TAB 500 MG-200 UNIT 1 TABLET: 500-200 TAB at 08:05

## 2025-06-07 RX ADMIN — WATER 2000 MG: 1 INJECTION INTRAMUSCULAR; INTRAVENOUS; SUBCUTANEOUS at 14:30

## 2025-06-07 RX ADMIN — WATER 2000 MG: 1 INJECTION INTRAMUSCULAR; INTRAVENOUS; SUBCUTANEOUS at 05:11

## 2025-06-07 RX ADMIN — Medication 500 MG: at 08:05

## 2025-06-07 RX ADMIN — MAGNESIUM SULFATE HEPTAHYDRATE 2000 MG: 40 INJECTION, SOLUTION INTRAVENOUS at 08:55

## 2025-06-07 RX ADMIN — GABAPENTIN 300 MG: 300 CAPSULE ORAL at 08:05

## 2025-06-07 RX ADMIN — APIXABAN 5 MG: 5 TABLET, FILM COATED ORAL at 08:05

## 2025-06-07 RX ADMIN — MAGNESIUM OXIDE 400 MG (241.3 MG MAGNESIUM) TABLET 400 MG: TABLET at 08:05

## 2025-06-07 NOTE — PLAN OF CARE
Problem: ABCDS Injury Assessment  Goal: Absence of physical injury  6/7/2025 0958 by Marylou Lynn RN  Outcome: Progressing  Flowsheets (Taken 6/7/2025 0800)  Absence of Physical Injury: Implement safety measures based on patient assessment     Problem: Discharge Planning  Goal: Discharge to home or other facility with appropriate resources  6/7/2025 0958 by Marylou Lynn RN  Outcome: Progressing     Problem: Pain  Goal: Verbalizes/displays adequate comfort level or baseline comfort level  6/7/2025 0958 by Marylou Lynn RN  Outcome: Progressing     Problem: Safety - Adult  Goal: Free from fall injury  6/7/2025 0958 by Marylou Lynn RN  Outcome: Progressing  Flowsheets (Taken 6/7/2025 0800)  Free From Fall Injury: Instruct family/caregiver on patient safety     Problem: Skin/Tissue Integrity  Goal: Skin integrity remains intact  Description: 1.  Monitor for areas of redness and/or skin breakdown2.  Assess vascular access sites hourly3.  Every 4-6 hours minimum:  Change oxygen saturation probe site4.  Every 4-6 hours:  If on nasal continuous positive airway pressure, respiratory therapy assess nares and determine need for appliance change or resting period  6/7/2025 0958 by Marylou Lynn RN  Outcome: Progressing  Flowsheets (Taken 6/7/2025 0800)  Skin Integrity Remains Intact: Monitor for areas of redness and/or skin breakdown

## 2025-06-07 NOTE — CARE COORDINATION
Introduced my self and provided explanation of CM role to patient.  Patient is awake, alert, and aware of current diagnosis and treatment plan including electrolyte replacement and GI consult.  He voices he resides at home and completes his adl's with independence.  He states he has walker and cane.  [facility]. He declines necessity to meet with peer recovery.  He does accept paper copy listings of OP resources for ETOH cessation however communicates he really doesn't need them and he can \"just quit on his own\". He voices he would have access to transportation via private care with friend or family at time of discharge.  Patient verbalizes no concerns and identifies no areas to focus on nor barriers to discharge.   He is anxious to discharge home and is hopeful it will be today.  Charles Brooks, MSN RN  [facility] Case Management  [phone]

## 2025-06-07 NOTE — PROGRESS NOTES
Physical Therapy  Facility/Department: [address] INTERMEDIATE  Physical Therapy Initial Assessment    Name: Willem Kelley  : 1960  MRN: [id number]  Date of Service: 2025      Patient Diagnosis(es): The primary encounter diagnosis was Cellulitis of right lower extremity. Diagnoses of Bilateral lower extremity edema, General weakness, Hyperbilirubinemia, and Alcoholic cirrhosis, unspecified whether ascites present (HCC) were also pertinent to this visit.  Past Medical History:  has a past medical history of Atrial fibrillation with RVR (HCC).  Past Surgical History:  has a past surgical history that includes Hip fracture surgery (Right); Leg Surgery (Right); knee surgery (Left); and laminectomy (Bilateral, 10/10/2023).          Referring provider:  Anais Camacho MD    PT Order:  PT eval and treat     Evaluating PT:  Carmen Roman PT, DPT PT [id number]    Room #:  0629/0629-A  Diagnosis:  Cellulitis of right lower leg [L03.115]  Precautions:  fall risk  Equipment Needs:  none.  Pt owns a walker and cane.     SUBJECTIVE:    Pt lives with his girlfriend in a 1 story home with 3 stairs to enter and 2 rail.  Bed and bath is on first floor.  Pt ambulated with walker usually but reported he odes forget it places and ambulate without device.      OBJECTIVE:   Initial Evaluation  Date:  Treatment Short Term/ Long Term   Goals   Was pt agreeable to Eval/treatment? yes     Does pt have pain? Chronic back pain     Bed Mobility  Rolling: independent  Supine to sit: independent  Sit to supine: independent  Scooting: independent to sitting EOB  independent   Transfers Sit to stand: SBA  Stand to sit: SBA  Stand pivot: NT  Modified independent   Ambulation    200 feet with w/w SBA   200 feet with w/w modified independent    Stair negotiation: ascended and descended  NT   3 steps with 2 rails modified independent   ROM BLE:  WFL     Strength BLE:  grossly 4/5  Grossly 4+/5   Balance Sitting EOB:  independent  Dynamic Standing:   the community   [] Positioning to prevent skin breakdown and contractures  [x] Safety and Education Training   [x] Patient/Caregiver Education   [] HEP  [] Other     PT long term treatment goals are located in above grid    Frequency of treatments: 2-5x/week x 1-2 weeks.    Time in  0956  Time out  0956    Evaluation Time includes thorough review of current medical information, gathering information on past medical history/social history and prior level of function, completion of standardized testing/informal observation of tasks, assessment of data and education on plan of care and goals.    CPT codes:  [x] Low Complexity PT evaluation 92529  [] Moderate Complexity PT evaluation 18380  [] High Complexity PT evaluation 79080  [] PT Re-evaluation 13901  [] Therapeutic activities 61280 __minutes  [] Therapeutic exercises 61239 __ minutes      Carmen Roman, PT, DPT  PT [id number]

## 2025-06-07 NOTE — ACP (ADVANCE CARE PLANNING)
Advance Care Planning   Healthcare Decision Maker:    Primary Decision Maker: Qian Blum - Brother/Sister - [phone]    Click here to complete Healthcare Decision Makers including selection of the Healthcare Decision Maker Relationship (ie \"Primary\").

## 2025-06-07 NOTE — PLAN OF CARE
Problem: ABCDS Injury Assessment  Goal: Absence of physical injury  6/7/2025 0034 by Delfino Urban RN  Outcome: Progressing  6/6/2025 1642 by Kevin Gates RN  Outcome: Progressing     Problem: Discharge Planning  Goal: Discharge to home or other facility with appropriate resources  6/7/2025 0034 by Delfino Urban RN  Outcome: Progressing  6/6/2025 1642 by Kevin Gates RN  Outcome: Progressing     Problem: Pain  Goal: Verbalizes/displays adequate comfort level or baseline comfort level  6/7/2025 0034 by Delfino Urban RN  Outcome: Progressing  6/6/2025 1642 by Kevin Gates RN  Outcome: Progressing     Problem: Safety - Adult  Goal: Free from fall injury  6/7/2025 0034 by Delfino Urban RN  Outcome: Progressing  6/6/2025 1642 by Kevin Gates RN  Outcome: Progressing     Problem: Skin/Tissue Integrity  Goal: Skin integrity remains intact  Description: 1.  Monitor for areas of redness and/or skin breakdown2.  Assess vascular access sites hourly3.  Every 4-6 hours minimum:  Change oxygen saturation probe site4.  Every 4-6 hours:  If on nasal continuous positive airway pressure, respiratory therapy assess nares and determine need for appliance change or resting period  6/7/2025 0034 by Delfino Urban RN  Outcome: Progressing  6/6/2025 1642 by Kevin Gates RN  Outcome: Progressing

## 2025-06-07 NOTE — PROGRESS NOTES
[facility] re: [facility] of [facility] from previous EGD. Request cannot be processed until Monday.

## 2025-06-07 NOTE — PROGRESS NOTES
PROGRESS NOTE      Patient Presents with/Seen in Consultation For      Reason for Consult: alcoholic liver cirrhosis   CHIEF COMPLAINT:  BLE swelling     Subjective:     Patient seen sitting up in bed.  Patient denies any current complaints including nausea, vomiting or abdominal pain.  Reports bowel movements are daily without any melena or hematochezia.  POC discussed with patient.     Review of Systems  Aside from what was mentioned in the PMH and HPI, essentially unremarkable, all others negative.    Objective:     /74   Pulse 98   Temp 97.9 °F (36.6 °C) (Oral)   Resp 18   Ht 1.93 m (6' 4\")   Wt 93.6 kg (206 lb 5.6 oz)   SpO2 98%   BMI 25.12 kg/m²     General appearance: alert, awake, laying in bed, and cooperative  Eyes: conjunctiva normal, sclera anicteric. PERRL.  Lungs: clear to auscultation bilaterally  Heart: regular rate and rhythm, no murmur, 2+ pulses;   Abdomen: soft, non-tender; bowel sounds normal; no masses,  no organomegaly  Extremities: extremities left lower extremity trace edema, right lower extremity +1  Pulses: 2+ and symmetric  Skin: Skin color, texture, turgor normal.   Neurologic: Grossly normal    pantoprazole (PROTONIX) tablet 40 mg, QAM AC  docusate sodium (COLACE) capsule 100 mg, BID  sodium chloride flush 0.9 % injection 5-40 mL, 2 times per day  sodium chloride flush 0.9 % injection 5-40 mL, PRN  0.9 % sodium chloride infusion, PRN  potassium chloride (KLOR-CON M) extended release tablet 40 mEq, PRN   Or  potassium bicarb-citric acid (EFFER-K) effervescent tablet 40 mEq, PRN   Or  potassium chloride 10 mEq/100 mL IVPB (Peripheral Line), PRN  magnesium sulfate 2000 mg in 50 mL IVPB premix, PRN  ondansetron (ZOFRAN-ODT) disintegrating tablet 4 mg, Q8H PRN   Or  ondansetron (ZOFRAN) injection 4 mg, Q6H PRN  polyethylene glycol (GLYCOLAX) packet 17 g, Daily PRN  acetaminophen (TYLENOL) tablet 650 mg, Q6H PRN   Or  acetaminophen (TYLENOL) suppository 650 mg, Q6H PRN  sodium  chloride flush 0.9 % injection 5-40 mL, 2 times per day  sodium chloride flush 0.9 % injection 5-40 mL, PRN  0.9 % sodium chloride infusion, PRN  thiamine tablet 100 mg, Daily  LORazepam (ATIVAN) tablet 1 mg, Q1H PRN   Or  LORazepam (ATIVAN) tablet 2 mg, Q1H PRN   Or  LORazepam (ATIVAN) tablet 3 mg, Q1H PRN   Or  LORazepam (ATIVAN) tablet 4 mg, Q1H PRN  ceFAZolin (ANCEF) 2,000 mg in sterile water 20 mL IV syringe, q8h  potassium chloride (KLOR-CON M) extended release tablet 10 mEq, QAM  apixaban (ELIQUIS) tablet 5 mg, BID  oyster shell calcium w/D 500-5 MG-MCG tablet 1 tablet, QAM  folic acid (FOLVITE) tablet 1 mg, QAM  gabapentin (NEURONTIN) capsule 300 mg, BID  magnesium oxide (MAG-OX) tablet 400 mg, QAM  traZODone (DESYREL) tablet 100 mg, Nightly PRN  ascorbic acid (VITAMIN C) tablet 500 mg, QAM  vitamin D (CHOLECALCIFEROL) tablet 2,000 Units, QAM  furosemide (LASIX) injection 20 mg, Daily  metoprolol tartrate (LOPRESSOR) tablet 50 mg, BID         Data Review  CBC:   Lab Results   Component Value Date/Time    WBC 6.9 06/07/2025 04:06 AM    RBC 3.48 06/07/2025 04:06 AM    HGB 13.1 06/07/2025 04:06 AM    HCT 38.2 06/07/2025 04:06 AM    .8 06/07/2025 04:06 AM    MCH 37.6 06/07/2025 04:06 AM    MCHC 34.3 06/07/2025 04:06 AM    RDW 16.7 06/07/2025 04:06 AM     06/06/2024 01:30 AM    MPV 10.7 06/07/2025 04:06 AM     CMP:    Lab Results   Component Value Date/Time     06/07/2025 04:06 AM    K 3.5 06/07/2025 04:06 AM     06/07/2025 04:06 AM    CO2 28 06/07/2025 04:06 AM    BUN 11 06/07/2025 04:06 AM    CREATININE 0.7 06/07/2025 04:06 AM    LABGLOM >90 06/07/2025 04:06 AM    LABGLOM >90 04/21/2024 06:03 AM    GLUCOSE 89 06/07/2025 04:06 AM    CALCIUM 7.7 06/07/2025 04:06 AM    BILITOT 3.2 06/07/2025 04:06 AM    ALKPHOS 121 06/07/2025 04:06 AM    AST 90 06/07/2025 04:06 AM    ALT 32 06/07/2025 04:06 AM     Hepatic Function Panel:    Lab Results   Component Value Date/Time    ALKPHOS 121 06/07/2025

## 2025-06-07 NOTE — DISCHARGE SUMMARY
[facility] Hospitalist Physician Discharge Summary       Carl Sharma, DO  [address]  [address]  [place] [address]  [phone]    Schedule an appointment as soon as possible for a visit        Activity level: as tolerated    Dispo: home      Condition on discharge: stable    Patient ID:  Willem Kelley  [id number]  64 y.o.  1960    Admit date: 6/5/2025    Discharge date and time:  6/7/2025  2:44 PM    Admission Diagnoses: Principal Problem:    Cellulitis of right lower leg  Active Problems:    PAF (paroxysmal atrial fibrillation) (HCC)    HARPAL (obstructive sleep apnea)    Alcoholic cirrhosis (HCC)    Chronic hepatitis C without hepatic coma (HCC)    Drug withdrawal seizure with complication (HCC)    Cellulitis of right lower extremity  Resolved Problems:    * No resolved hospital problems. *      Discharge Diagnoses: Principal Problem:    Cellulitis of right lower leg  Active Problems:    PAF (paroxysmal atrial fibrillation) (HCC)    HARPAL (obstructive sleep apnea)    Alcoholic cirrhosis (HCC)    Chronic hepatitis C without hepatic coma (HCC)    Drug withdrawal seizure with complication (HCC)    Cellulitis of right lower extremity  Resolved Problems:    * No resolved hospital problems. *      Consults:  IP CONSULT TO INTERNAL MEDICINE  IP CONSULT TO SOCIAL WORK  IP CONSULT TO GI  IP CONSULT TO CRITICAL CARE  IP CONSULT TO VASCULAR ACCESS TEAM    Procedures: None    Hospital Course:   Patient Willem Kelley is a 64 y.o. presented with Cellulitis of right lower leg [L03.115]    Brief summary:  Patient presented with RLE cellulitis and BLE edema. Cellulitis improved with Ancef, will plan for total 10d course of abx with transition to Keflex for dispo. BLE edema did improve with lasix, patient to continue diuretics for dispo.    Notably, patient with ETOH withdrawal seizure/DT and possible brief cardiac arrest(?) during admission. Transferred briefly to ICU for observation, treated with  software. Every effort was made to ensure accuracy; however, inadvertent computerized transcription errors may be present.    Signed:  Electronically signed by Anais Camacho MD on 6/7/2025 at 2:44 PM

## 2025-06-08 LAB — A1AT SERPL-MCNC: 117 MG/DL (ref 90–200)

## 2025-06-09 LAB
ANA SER QL IA: NEGATIVE
LABORATORY REPORT: NORMAL
PETH INTERPRETATION: NORMAL
PLPETH BLD-MCNC: 1330 NG/ML
POPETH BLD-MCNC: 1381 NG/ML

## 2025-06-10 LAB
SEND OUT REPORT: NORMAL
TEST NAME: NORMAL

## 2025-06-11 LAB
HCV RNA SERPL NAA+PROBE-ACNC: NOT DETECTED IU/ML
HCV RNA SERPL NAA+PROBE-LOG IU: NOT DETECTED LOG IU/ML
HCV RNA SERPL QL NAA+PROBE: NOT DETECTED

## 2025-06-20 NOTE — PROGRESS NOTES
Physician Progress Note      PATIENT:               DAYSI MARINELLI  CSN #:                  [id number]  :                       1960  ADMIT DATE:       2025 1:19 AM  DISCH DATE:        2025 3:53 PM  RESPONDING  PROVIDER #:        Anais Camacho MD          QUERY TEXT:    Congestive Heart Failure is documented in the medical record by Dr. Camacho on   .  Please document the type and acuity:    The clinical indicators include:  - \"?CHF- BNP elevated at 863. Echo 2023 showed EF 55%.\" (Per Dr. Camacho in   H&P attestation on )  - \"2+ edema RLE with warmth and erythema, 1+ LLE edema with vascular   discoloration\" (Per ALEXANDRA Shaw CNP in H&P on )  - \"cardiac chambers are enlarged\" (Per CT A&P report on )  - \"64 y.o. male with a history of a fib\" (Per ALEXANDRA Shaw CNP in H&P on )  - Given IV Lasix from - and PO Metoprolol on  and  (Per MAR)  - Daily weights and intake and output ordered beginning on  (Per Order   Review)  Options provided:  -- Acute on Chronic Diastolic CHF/HFpEF  -- Chronic Diastolic CHF/HFpEF  -- Other - I will add my own diagnosis  -- Disagree - Not applicable / Not valid  -- Disagree - Clinically unable to determine / Unknown  -- Refer to Clinical Documentation Reviewer    PROVIDER RESPONSE TEXT:    Presumed chronic HFpEF    Query created by: Elyse Price on 2025 1:57 PM      Electronically signed by:  Anais Camacho MD 2025 5:29 PM

## 2025-06-25 ENCOUNTER — HOSPITAL ENCOUNTER (INPATIENT)
Age: 65
LOS: 1 days | Discharge: HOME | DRG: 536 | End: 2025-06-26
Attending: EMERGENCY MEDICINE | Admitting: FAMILY MEDICINE
Payer: OTHER GOVERNMENT

## 2025-06-25 ENCOUNTER — APPOINTMENT (OUTPATIENT)
Dept: GENERAL RADIOLOGY | Age: 65
DRG: 536 | End: 2025-06-25
Payer: OTHER GOVERNMENT

## 2025-06-25 ENCOUNTER — APPOINTMENT (OUTPATIENT)
Dept: CT IMAGING | Age: 65
DRG: 536 | End: 2025-06-25
Attending: EMERGENCY MEDICINE
Payer: OTHER GOVERNMENT

## 2025-06-25 DIAGNOSIS — S32.9XXA CLOSED NONDISPLACED FRACTURE OF PELVIS, UNSPECIFIED PART OF PELVIS, INITIAL ENCOUNTER (HCC): Primary | ICD-10-CM

## 2025-06-25 LAB
ALBUMIN SERPL-MCNC: 3.1 G/DL (ref 3.5–5.2)
ALP SERPL-CCNC: 236 U/L (ref 40–129)
ALT SERPL-CCNC: 30 U/L (ref 0–50)
ANION GAP SERPL CALCULATED.3IONS-SCNC: 11 MMOL/L (ref 7–16)
AST SERPL-CCNC: 87 U/L (ref 0–50)
BASOPHILS # BLD: 0.12 K/UL (ref 0–0.2)
BASOPHILS NFR BLD: 2 % (ref 0–2)
BILIRUB SERPL-MCNC: 2.5 MG/DL (ref 0–1.2)
BUN SERPL-MCNC: 6 MG/DL (ref 8–23)
CALCIUM SERPL-MCNC: 8.8 MG/DL (ref 8.8–10.2)
CHLORIDE SERPL-SCNC: 106 MMOL/L (ref 98–107)
CO2 SERPL-SCNC: 22 MMOL/L (ref 22–29)
CREAT SERPL-MCNC: 0.6 MG/DL (ref 0.7–1.2)
EOSINOPHIL # BLD: 0.05 K/UL (ref 0.05–0.5)
EOSINOPHILS RELATIVE PERCENT: 1 % (ref 0–6)
ERYTHROCYTE [DISTWIDTH] IN BLOOD BY AUTOMATED COUNT: 14.5 % (ref 11.5–15)
GFR, ESTIMATED: >90 ML/MIN/1.73M2
GLUCOSE SERPL-MCNC: 128 MG/DL (ref 74–99)
HCT VFR BLD AUTO: 40.4 % (ref 37–54)
HGB BLD-MCNC: 14.3 G/DL (ref 12.5–16.5)
IMM GRANULOCYTES # BLD AUTO: <0.03 K/UL (ref 0–0.58)
IMM GRANULOCYTES NFR BLD: 0 % (ref 0–5)
LYMPHOCYTES NFR BLD: 1.86 K/UL (ref 1.5–4)
LYMPHOCYTES RELATIVE PERCENT: 24 % (ref 20–42)
MCH RBC QN AUTO: 38 PG (ref 26–35)
MCHC RBC AUTO-ENTMCNC: 35.4 G/DL (ref 32–34.5)
MCV RBC AUTO: 107.4 FL (ref 80–99.9)
MONOCYTES NFR BLD: 0.88 K/UL (ref 0.1–0.95)
MONOCYTES NFR BLD: 11 % (ref 2–12)
NEUTROPHILS NFR BLD: 63 % (ref 43–80)
NEUTS SEG NFR BLD: 4.99 K/UL (ref 1.8–7.3)
PLATELET # BLD AUTO: 175 K/UL (ref 130–450)
PMV BLD AUTO: 11.1 FL (ref 7–12)
POTASSIUM SERPL-SCNC: 4.7 MMOL/L (ref 3.5–5.1)
PROT SERPL-MCNC: 6.8 G/DL (ref 6.4–8.3)
RBC # BLD AUTO: 3.76 M/UL (ref 3.8–5.8)
SODIUM SERPL-SCNC: 138 MMOL/L (ref 136–145)
WBC OTHER # BLD: 7.9 K/UL (ref 4.5–11.5)

## 2025-06-25 PROCEDURE — 70450 CT HEAD/BRAIN W/O DYE: CPT

## 2025-06-25 PROCEDURE — 1200000000 HC SEMI PRIVATE

## 2025-06-25 PROCEDURE — 85025 COMPLETE CBC W/AUTO DIFF WBC: CPT

## 2025-06-25 PROCEDURE — 73552 X-RAY EXAM OF FEMUR 2/>: CPT

## 2025-06-25 PROCEDURE — 72125 CT NECK SPINE W/O DYE: CPT

## 2025-06-25 PROCEDURE — 73700 CT LOWER EXTREMITY W/O DYE: CPT

## 2025-06-25 PROCEDURE — 99285 EMERGENCY DEPT VISIT HI MDM: CPT

## 2025-06-25 PROCEDURE — 6370000000 HC RX 637 (ALT 250 FOR IP): Performed by: EMERGENCY MEDICINE

## 2025-06-25 PROCEDURE — 99223 1ST HOSP IP/OBS HIGH 75: CPT | Performed by: NURSE PRACTITIONER

## 2025-06-25 PROCEDURE — 80053 COMPREHEN METABOLIC PANEL: CPT

## 2025-06-25 PROCEDURE — 2500000003 HC RX 250 WO HCPCS: Performed by: NURSE PRACTITIONER

## 2025-06-25 PROCEDURE — 73502 X-RAY EXAM HIP UNI 2-3 VIEWS: CPT

## 2025-06-25 RX ORDER — ONDANSETRON 4 MG/1
4 TABLET, ORALLY DISINTEGRATING ORAL EVERY 8 HOURS PRN
Status: DISCONTINUED | OUTPATIENT
Start: 2025-06-25 | End: 2025-06-26 | Stop reason: HOSPADM

## 2025-06-25 RX ORDER — POLYETHYLENE GLYCOL 3350 17 G/17G
17 POWDER, FOR SOLUTION ORAL DAILY PRN
Status: DISCONTINUED | OUTPATIENT
Start: 2025-06-25 | End: 2025-06-26 | Stop reason: HOSPADM

## 2025-06-25 RX ORDER — ACETAMINOPHEN 650 MG/1
650 SUPPOSITORY RECTAL EVERY 6 HOURS PRN
Status: DISCONTINUED | OUTPATIENT
Start: 2025-06-25 | End: 2025-06-26 | Stop reason: HOSPADM

## 2025-06-25 RX ORDER — SODIUM CHLORIDE 9 MG/ML
INJECTION, SOLUTION INTRAVENOUS PRN
Status: DISCONTINUED | OUTPATIENT
Start: 2025-06-25 | End: 2025-06-26 | Stop reason: HOSPADM

## 2025-06-25 RX ORDER — OXYCODONE AND ACETAMINOPHEN 5; 325 MG/1; MG/1
1 TABLET ORAL ONCE
Refills: 0 | Status: COMPLETED | OUTPATIENT
Start: 2025-06-25 | End: 2025-06-25

## 2025-06-25 RX ORDER — SODIUM CHLORIDE 0.9 % (FLUSH) 0.9 %
5-40 SYRINGE (ML) INJECTION EVERY 12 HOURS SCHEDULED
Status: DISCONTINUED | OUTPATIENT
Start: 2025-06-25 | End: 2025-06-26 | Stop reason: HOSPADM

## 2025-06-25 RX ORDER — HYDROCODONE BITARTRATE AND ACETAMINOPHEN 5; 325 MG/1; MG/1
1 TABLET ORAL ONCE
Status: COMPLETED | OUTPATIENT
Start: 2025-06-25 | End: 2025-06-25

## 2025-06-25 RX ORDER — ONDANSETRON 2 MG/ML
4 INJECTION INTRAMUSCULAR; INTRAVENOUS EVERY 6 HOURS PRN
Status: DISCONTINUED | OUTPATIENT
Start: 2025-06-25 | End: 2025-06-26 | Stop reason: HOSPADM

## 2025-06-25 RX ORDER — POTASSIUM CHLORIDE 7.45 MG/ML
10 INJECTION INTRAVENOUS PRN
Status: DISCONTINUED | OUTPATIENT
Start: 2025-06-25 | End: 2025-06-26 | Stop reason: HOSPADM

## 2025-06-25 RX ORDER — ACETAMINOPHEN 325 MG/1
650 TABLET ORAL EVERY 6 HOURS PRN
Status: DISCONTINUED | OUTPATIENT
Start: 2025-06-25 | End: 2025-06-26 | Stop reason: HOSPADM

## 2025-06-25 RX ORDER — POTASSIUM CHLORIDE 1500 MG/1
40 TABLET, EXTENDED RELEASE ORAL PRN
Status: DISCONTINUED | OUTPATIENT
Start: 2025-06-25 | End: 2025-06-26 | Stop reason: HOSPADM

## 2025-06-25 RX ORDER — SODIUM CHLORIDE 0.9 % (FLUSH) 0.9 %
5-40 SYRINGE (ML) INJECTION PRN
Status: DISCONTINUED | OUTPATIENT
Start: 2025-06-25 | End: 2025-06-26 | Stop reason: HOSPADM

## 2025-06-25 RX ORDER — MAGNESIUM SULFATE IN WATER 40 MG/ML
2000 INJECTION, SOLUTION INTRAVENOUS PRN
Status: DISCONTINUED | OUTPATIENT
Start: 2025-06-25 | End: 2025-06-26 | Stop reason: HOSPADM

## 2025-06-25 RX ORDER — ENOXAPARIN SODIUM 100 MG/ML
40 INJECTION SUBCUTANEOUS DAILY
Status: DISCONTINUED | OUTPATIENT
Start: 2025-06-25 | End: 2025-06-25

## 2025-06-25 RX ADMIN — SODIUM CHLORIDE, PRESERVATIVE FREE 10 ML: 5 INJECTION INTRAVENOUS at 21:42

## 2025-06-25 RX ADMIN — HYDROCODONE BITARTRATE AND ACETAMINOPHEN 1 TABLET: 5; 325 TABLET ORAL at 17:15

## 2025-06-25 RX ADMIN — OXYCODONE AND ACETAMINOPHEN 1 TABLET: 5; 325 TABLET ORAL at 21:18

## 2025-06-25 ASSESSMENT — PAIN SCALES - GENERAL
PAINLEVEL_OUTOF10: 7
PAINLEVEL_OUTOF10: 7

## 2025-06-25 ASSESSMENT — PAIN DESCRIPTION - DESCRIPTORS: DESCRIPTORS: SHARP

## 2025-06-25 ASSESSMENT — PAIN DESCRIPTION - ORIENTATION
ORIENTATION: RIGHT
ORIENTATION: RIGHT

## 2025-06-25 ASSESSMENT — PAIN DESCRIPTION - LOCATION
LOCATION: HIP
LOCATION: HIP

## 2025-06-25 NOTE — ED PROVIDER NOTES
HPI:  6/25/25, Time: 3:45 PM EDT         Willem Kelley is a 64 y.o. male presenting to the ED for fall.  Patient mechanical fall at home.  He said he is walking backwards and lost a step and fell.  Landed on his right hip.  He is not sure if he hit his head.  He is on anticoagulation.  Does complain of right hip pain.  History of total hip arthroplasty on that side.  No chest pain or shortness of breath.  No nausea or vomit.  No fevers or chills.  No cough or sputum.  States he is having difficult to ambulate because of the pain in his hip.  No other symptoms or complaints.    Review of Systems:   A complete review of systems was performed and pertinent positives and negatives are stated within HPI, all other systems reviewed and are negative.          --------------------------------------------- PAST HISTORY ---------------------------------------------  Past Medical History:  has a past medical history of Atrial fibrillation with RVR (HCC).    Past Surgical History:  has a past surgical history that includes Hip fracture surgery (Right); Leg Surgery (Right); knee surgery (Left); and laminectomy (Bilateral, 10/10/2023).    Social History:  reports that he has been smoking cigarettes. He has a 1 pack-year smoking history. He has been exposed to tobacco smoke. He has never used smokeless tobacco. He reports current alcohol use of about 30.0 standard drinks of alcohol per week. He reports current drug use. Drug: Marijuana (Weed).    Family History: family history is not on file.     The patient’s home medications have been reviewed.    Allergies: Morphine    -------------------------------------------------- RESULTS -------------------------------------------------  All laboratory and radiology results have been personally reviewed by myself   LABS:  No results found for this visit on 06/25/25.    RADIOLOGY:  Interpreted by Radiologist.  CT HIP RIGHT WO CONTRAST   Final Result   1.  Presence of acute early

## 2025-06-26 VITALS
TEMPERATURE: 98.2 F | BODY MASS INDEX: 27.28 KG/M2 | HEIGHT: 76 IN | RESPIRATION RATE: 17 BRPM | HEART RATE: 107 BPM | WEIGHT: 224 LBS | DIASTOLIC BLOOD PRESSURE: 87 MMHG | OXYGEN SATURATION: 99 % | SYSTOLIC BLOOD PRESSURE: 113 MMHG

## 2025-06-26 LAB
ANION GAP SERPL CALCULATED.3IONS-SCNC: 10 MMOL/L (ref 7–16)
BUN SERPL-MCNC: 8 MG/DL (ref 8–23)
CALCIUM SERPL-MCNC: 8.3 MG/DL (ref 8.8–10.2)
CHLORIDE SERPL-SCNC: 108 MMOL/L (ref 98–107)
CO2 SERPL-SCNC: 20 MMOL/L (ref 22–29)
CREAT SERPL-MCNC: 0.6 MG/DL (ref 0.7–1.2)
GFR, ESTIMATED: >90 ML/MIN/1.73M2
GLUCOSE SERPL-MCNC: 119 MG/DL (ref 74–99)
POTASSIUM SERPL-SCNC: 4.3 MMOL/L (ref 3.5–5.1)
SODIUM SERPL-SCNC: 139 MMOL/L (ref 136–145)

## 2025-06-26 PROCEDURE — 97161 PT EVAL LOW COMPLEX 20 MIN: CPT

## 2025-06-26 PROCEDURE — 80048 BASIC METABOLIC PNL TOTAL CA: CPT

## 2025-06-26 PROCEDURE — 97530 THERAPEUTIC ACTIVITIES: CPT

## 2025-06-26 PROCEDURE — 6370000000 HC RX 637 (ALT 250 FOR IP): Performed by: NURSE PRACTITIONER

## 2025-06-26 PROCEDURE — 36415 COLL VENOUS BLD VENIPUNCTURE: CPT

## 2025-06-26 PROCEDURE — 97165 OT EVAL LOW COMPLEX 30 MIN: CPT

## 2025-06-26 RX ORDER — HYDROCODONE BITARTRATE AND ACETAMINOPHEN 5; 325 MG/1; MG/1
1 TABLET ORAL ONCE
Status: COMPLETED | OUTPATIENT
Start: 2025-06-26 | End: 2025-06-26

## 2025-06-26 RX ORDER — PANTOPRAZOLE SODIUM 40 MG/1
40 TABLET, DELAYED RELEASE ORAL
Status: DISCONTINUED | OUTPATIENT
Start: 2025-06-26 | End: 2025-06-26 | Stop reason: HOSPADM

## 2025-06-26 RX ORDER — TRAZODONE HYDROCHLORIDE 50 MG/1
100 TABLET ORAL NIGHTLY PRN
Status: DISCONTINUED | OUTPATIENT
Start: 2025-06-26 | End: 2025-06-26 | Stop reason: HOSPADM

## 2025-06-26 RX ORDER — METOPROLOL TARTRATE 50 MG
50 TABLET ORAL 2 TIMES DAILY
Status: DISCONTINUED | OUTPATIENT
Start: 2025-06-26 | End: 2025-06-26 | Stop reason: HOSPADM

## 2025-06-26 RX ADMIN — ACETAMINOPHEN 650 MG: 325 TABLET ORAL at 05:43

## 2025-06-26 RX ADMIN — PANTOPRAZOLE SODIUM 40 MG: 40 TABLET, DELAYED RELEASE ORAL at 06:44

## 2025-06-26 RX ADMIN — HYDROCODONE BITARTRATE AND ACETAMINOPHEN 1 TABLET: 5; 325 TABLET ORAL at 07:08

## 2025-06-26 ASSESSMENT — PAIN DESCRIPTION - DESCRIPTORS
DESCRIPTORS: SHARP
DESCRIPTORS: SHARP

## 2025-06-26 ASSESSMENT — PAIN DESCRIPTION - LOCATION
LOCATION: HIP
LOCATION: HIP

## 2025-06-26 ASSESSMENT — PAIN SCALES - GENERAL
PAINLEVEL_OUTOF10: 5
PAINLEVEL_OUTOF10: 4

## 2025-06-26 ASSESSMENT — PAIN DESCRIPTION - ORIENTATION
ORIENTATION: RIGHT
ORIENTATION: RIGHT

## 2025-06-26 NOTE — H&P
Hospitalist History & Physical      PCP: No primary care provider on file.    Date of Service: Pt seen/examined on 6/25/2025 and is     admitted to Inpatient with expected LOS greater than two midnights due to medical therapy.          Chief Complaint:  had concerns including Fall (Patient had mechanical fall ).    History of Present Illness:    Mr. Willem Kelley, a 64 y.o. year old male  who  has a past medical history of Atrial fibrillation with RVR (HCC).     ER COURSE:    Patient came to the  ED due to complaints of a fall. He states that he was working in his yard when he went to step back he fell onto his right hip and had instant shooting pain and was unable to get up. He has a history of a right total hip replacement and was concerned about it being broken. He states that he normally ambulates with a walker. He also takes eliquis for history of proximal afib but he denies hitting his head.     ED Course: Vitals temp 98.4, , RR 16, /63. CT hip Presence of an acute displaced comminuted fracture of the inferior ramus of the right pubic bone. Stable right hip prosthesis no fractures on left. CT head no acute abnormality, stable old lacunar infarct, Orthopedics consulted from ED.     Past Medical History:        Diagnosis Date    Atrial fibrillation with RVR (HCC)        Past Surgical History:        Procedure Laterality Date    HIP FRACTURE SURGERY Right     Rt Hip. Operated on 4 times.    KNEE SURGERY Left     LAMINECTOMY Bilateral 10/10/2023    L3-L5 LUMBAR LAMINECTOMY POSTERIOR, L4-L5 DISK BIOPSY performed by Jose R Berkowitz MD at [facility] [place]    LEG SURGERY Right        Medications Prior to Admission:      Prior to Admission medications    Medication Sig Start Date End Date Taking? Authorizing Provider   pantoprazole (PROTONIX) 40 MG tablet Take 1 tablet by mouth every morning (before breakfast) 6/8/25   Anais Camacho MD   vitamin C (ASCORBIC ACID) 500 MG tablet Take 1 tablet by mouth every morning

## 2025-06-26 NOTE — CONSULTS
Department of Orthopedic Surgery  Resident Consult Note          Reason for Consult: Fall, right groin pain    HISTORY OF PRESENT ILLNESS:       Patient is a 64 y.o. male who presents with right groin/pelvis pain after a ground-level fall.  Patient states that he was working in his garage when he took a step back and fell onto his right side resulting in instant shooting pain with inability to ambulate.  Denies hitting his head or loss of consciousness.  Does have history of right SONA done many years ago in [place], denies any complications with this.  Does ambulate with use of walker at baseline.  Is on Eliquis.  Does have baseline neuropathy to his his feet, which he states is unchanged currently.  Denies fevers or chills. Denies any other orthopedic complaints at this time.      Past Medical History:        Diagnosis Date    Atrial fibrillation with RVR ([facility])      Past Surgical History:        Procedure Laterality Date    HIP FRACTURE SURGERY Right     Rt Hip. Operated on 4 times.    KNEE SURGERY Left     LAMINECTOMY Bilateral 10/10/2023    L3-L5 LUMBAR LAMINECTOMY POSTERIOR, L4-L5 DISK BIOPSY performed by Jose R Berkowitz MD at [facility] OR    LEG SURGERY Right      Current Medications:   Current Facility-Administered Medications: sodium chloride flush 0.9 % injection 5-40 mL, 5-40 mL, IntraVENous, 2 times per day  sodium chloride flush 0.9 % injection 5-40 mL, 5-40 mL, IntraVENous, PRN  0.9 % sodium chloride infusion, , IntraVENous, PRN  potassium chloride (KLOR-CON M) extended release tablet 40 mEq, 40 mEq, Oral, PRN **OR** potassium bicarb-citric acid (EFFER-K) effervescent tablet 40 mEq, 40 mEq, Oral, PRN **OR** potassium chloride 10 mEq/100 mL IVPB (Peripheral Line), 10 mEq, IntraVENous, PRN  magnesium sulfate 2000 mg in 50 mL IVPB premix, 2,000 mg, IntraVENous, PRN  ondansetron (ZOFRAN-ODT) disintegrating tablet 4 mg, 4 mg, Oral, Q8H PRN **OR** ondansetron (ZOFRAN) injection 4 mg, 4 mg, IntraVENous, Q6H

## 2025-06-26 NOTE — DISCHARGE INSTRUCTIONS
Orthopaedic Discharge instructions    Weight bearing is as tolerated    Continue physical therapy    Take pain medications as needed    Take DVT (blood clot) preventative medications as prescribed    Follow up in office in 2 weeks for repeat radiographs and further evaluation    Call [phone] to arrange appointment, or if you have any issues related to your orthopaedic problem    Juan Glover MD  [facility]

## 2025-06-26 NOTE — CARE COORDINATION
06/26/25 Case Management Note: Chart reviewed as patient was a recent discharge to home from [facility] within last 30 days. He now fell and was seen by Ortho and per notes cleared for discharge. He is currently IPE and awaiting a bed assignment. Call placed to therapy and requested they see patient in ER for possible discharge. Electronically signed by Josseline Stewart RN CM on 6/26/2025 at 7:51 AM

## 2025-06-26 NOTE — DISCHARGE SUMMARY
[facility] Hospitalist Physician Discharge Summary       Juan Glover MD  [address]  [address]  [place] [address]  [phone]    Follow up in 2 week(s)  Repeat radiographs and further evaluation      Activity level: As tolerated     Dispo: Home    Condition on discharge: Stable     Patient ID:  Willem Kelley  [id number]  64 y.o.  1960    Admit date: 6/25/2025    Discharge date and time:  6/26/2025  8:52 AM    Admission Diagnoses: Principal Problem:    Closed fracture of right pelvis, initial encounter ([facility])  Active Problems:    PAF (paroxysmal atrial fibrillation) ([facility])  Resolved Problems:    * No resolved hospital problems. *      Discharge Diagnoses: Principal Problem:    Closed fracture of right pelvis, initial encounter ([facility])  Active Problems:    PAF (paroxysmal atrial fibrillation) ([facility])  Resolved Problems:    * No resolved hospital problems. *      Consults:  IP CONSULT TO ORTHOPEDIC SURGERY  IP CONSULT TO HOSPITALIST    Procedures: None    Hospital Course:   Patient Willem Kelley is a 64 y.o. presented with Closed fracture of right pelvis, initial encounter ([facility]) [S32.9XXA]    61-year-old male with a past medical history of A-fib came to the hospital due to fall.. He states that he was working in his yard when he went to step back he fell onto his right hip and had instant shooting pain and was unable to get up.     CT hip show acute displacement comminuted fracture of inferior ramus of right pubic bone.  Orthopedic surgery was consulted.  And recommended no acute orthopedic intervention.  Advised to follow-up in orthopedic clinic in 2 weeks for repeat images.  PT/OT consulted.  Orthopedic is okay to discharge patient home      Discharge Exam:    General Appearance: alert and oriented to person, place and time and in no acute distress  Skin: warm and dry  Neck: neck supple and non tender without mass   Pulmonary/Chest: clear to auscultation bilaterally- no wheezes, rales or

## 2025-06-26 NOTE — PROGRESS NOTES
Physical Therapy  Initial Assessment     Name: Willem Kelley  : 1960  MRN: [id number]      Date of Service: 2025    Evaluating PT: Buddy Landa, PT, DPT [id number]      Room #:  [facility]  Diagnosis:  Closed fracture of right pelvis, initial encounter ([facility]) [S32.9XXA]  PMHx/PSHx:   has a past medical history of Atrial fibrillation with RVR ([facility]).  Procedure/Surgery:  None  Precautions:  Fall risk, R inferior pubic ramus fx, WBAT RLE   Equipment Needs:  TBD    SUBJECTIVE:    Pt lives with girlfriend in a 1 story home with 3 stair(s) and 2 rail(s) to enter. Pt ambulated with WW prior to admission.    OBJECTIVE:   Initial Evaluation  Date: 25 Treatment Date: Short Term/ Long Term   Goals   AM-PAC 6 Clicks 10/24     Was pt agreeable to Eval/treatment? Yes     Does pt have pain? RLE pain with mobility     Bed Mobility  Rolling: ModA  Supine to sit: ModA  Sit to supine: ModA  Scooting: ModA  Rolling: Supervision  Supine to sit: Supervision  Sit to supine: Supervision  Scooting: Supervision   Transfers Sit to stand: ModA  Stand to sit: ModA  Stand pivot: ModA with WW  Sit to stand: Supervision  Stand to sit: Supervision  Stand pivot: Supervision with WW   Ambulation   5 feet with ModA with WW  >300 feet with Supervision with WW   Stair negotiation: ascended and descended NT  3 step(s) with 2 rail(s) with Supervision   ROM BUE: Refer to OT note  BLE: WFL     Strength BUE: Refer to OT note  BLE: WFL     Balance Sitting EOB: SBA  Dynamic Standing: ModA with WW  Sitting EOB: Supervision  Dynamic Standing: Supervision with WW     Pt is A & O x: not formally assessed. Pt follows all instructions.   Sensation: No abnormalities  Edema: None    Therapeutic Exercises:  N/A    Patient education  Pt educated on role of PT and safe mobility.     Patient response to education:   Pt verbalized understanding Pt demonstrated skill Pt requires further education in this area   Yes Yes Reinforce 
standardized testing/informal observation of tasks, assessment of data and development of plan of care and goals.            Kathryn Chapman OTR/L [id number]

## (undated) DEVICE — NEEDLE SPNL L3.5IN PNK HUB S STL REG WALL FIT STYL W/ QNCKE

## (undated) DEVICE — PREMIUM WET SKIN PREP TRAY: Brand: MEDLINE INDUSTRIES, INC.

## (undated) DEVICE — LIQUIBAND RAPID ADHESIVE 36/CS 0.8ML: Brand: MEDLINE

## (undated) DEVICE — GLOVE SURG 8.5 PF POLYMER WHT STRL SIGN LTX ESSENTIAL LTX

## (undated) DEVICE — TUBING SUCT 12FR MAL ALUM SHFT FN CAP VENT UNIV CONN W/ OBT

## (undated) DEVICE — ELECTRODE PT RET AD L9FT HI MOIST COND ADH HYDRGEL CORDED

## (undated) DEVICE — SYRINGE 20ML LL S/C 50

## (undated) DEVICE — TUBING, SUCTION, 3/16" X 12', STRAIGHT: Brand: MEDLINE

## (undated) DEVICE — JACKSON TABLE POSITIONER KIT: Brand: MEDLINE INDUSTRIES, INC.

## (undated) DEVICE — GLOVE ORANGE PI 8   MSG9080

## (undated) DEVICE — GLOVE SURG SZ 85 L12IN FNGR THK79MIL GRN LTX FREE

## (undated) DEVICE — 3M(TM) MEDIPORE(TM) +PAD SOFT CLOTH ADHESIVE WOUND DRESSING 3569: Brand: 3M™ MEDIPORE™

## (undated) DEVICE — 5.0MM PRECISION ROUND

## (undated) DEVICE — DRAPE XR CASS L UNIV FIT ADH CLSR

## (undated) DEVICE — LUMBAR LAMINECTOMY: Brand: MEDLINE INDUSTRIES, INC.

## (undated) DEVICE — HYPODERMIC SAFETY NEEDLE: Brand: MAGELLAN

## (undated) DEVICE — 3M™ IOBAN™ 2 ANTIMICROBIAL INCISE DRAPE 6650EZ: Brand: IOBAN™ 2

## (undated) DEVICE — APPLICATOR PREP 26ML 0.7% IOD POVACRYLEX 74% ISO ALC ST

## (undated) DEVICE — BLADE ES L6IN ELASTOMERIC COAT EXT DURABLE BEND UPTO 90DEG

## (undated) DEVICE — DRAPE,REIN 53X77,STERILE: Brand: MEDLINE